# Patient Record
Sex: FEMALE | Race: WHITE | Employment: OTHER | URBAN - METROPOLITAN AREA
[De-identification: names, ages, dates, MRNs, and addresses within clinical notes are randomized per-mention and may not be internally consistent; named-entity substitution may affect disease eponyms.]

---

## 2018-03-23 ENCOUNTER — OFFICE VISIT (OUTPATIENT)
Dept: FAMILY MEDICINE CLINIC | Age: 68
End: 2018-03-23
Payer: MEDICARE

## 2018-03-23 VITALS
WEIGHT: 178 LBS | DIASTOLIC BLOOD PRESSURE: 68 MMHG | OXYGEN SATURATION: 91 % | SYSTOLIC BLOOD PRESSURE: 122 MMHG | HEART RATE: 74 BPM | BODY MASS INDEX: 35.88 KG/M2 | TEMPERATURE: 97.8 F | HEIGHT: 59 IN

## 2018-03-23 DIAGNOSIS — E11.9 TYPE 2 DIABETES MELLITUS WITHOUT COMPLICATION, WITHOUT LONG-TERM CURRENT USE OF INSULIN (HCC): Primary | ICD-10-CM

## 2018-03-23 DIAGNOSIS — Z12.39 BREAST CANCER SCREENING: ICD-10-CM

## 2018-03-23 DIAGNOSIS — E04.9 ENLARGED THYROID: ICD-10-CM

## 2018-03-23 DIAGNOSIS — R79.89 ELEVATED LFTS: ICD-10-CM

## 2018-03-23 DIAGNOSIS — E04.2 MULTIPLE THYROID NODULES: ICD-10-CM

## 2018-03-23 PROCEDURE — 99214 OFFICE O/P EST MOD 30 MIN: CPT | Performed by: NURSE PRACTITIONER

## 2018-03-23 RX ORDER — PEN NEEDLE, DIABETIC 31 G X1/4"
1 NEEDLE, DISPOSABLE MISCELLANEOUS DAILY
Qty: 100 EACH | Refills: 5 | Status: SHIPPED | OUTPATIENT
Start: 2018-03-23 | End: 2019-03-07 | Stop reason: SDUPTHER

## 2018-03-23 ASSESSMENT — ENCOUNTER SYMPTOMS
DIARRHEA: 1
NAUSEA: 0
WHEEZING: 0
BLURRED VISION: 0
ABDOMINAL PAIN: 1
DOUBLE VISION: 0
VOMITING: 0
SHORTNESS OF BREATH: 0
COUGH: 0
CONSTIPATION: 0

## 2018-03-28 ENCOUNTER — TELEPHONE (OUTPATIENT)
Dept: FAMILY MEDICINE CLINIC | Age: 68
End: 2018-03-28

## 2018-03-28 NOTE — TELEPHONE ENCOUNTER
Please call patient and see how she is doing on the Victoza. Let her know that the side effects should subside in a week or so. She is not to increase the dose until the side effects have improved. If she cannot tolerate this medicine after 2 weeks, then she needs an appointment to come see me so that we can determine the next course of treatment. Thank you!

## 2018-03-30 ENCOUNTER — HOSPITAL ENCOUNTER (OUTPATIENT)
Dept: ULTRASOUND IMAGING | Age: 68
Discharge: HOME | End: 2018-04-01
Payer: MEDICARE

## 2018-03-30 DIAGNOSIS — E04.9 ENLARGED THYROID: ICD-10-CM

## 2018-03-30 DIAGNOSIS — E04.2 MULTIPLE THYROID NODULES: ICD-10-CM

## 2018-03-30 PROCEDURE — 76536 US EXAM OF HEAD AND NECK: CPT

## 2018-04-17 ENCOUNTER — TELEPHONE (OUTPATIENT)
Dept: FAMILY MEDICINE CLINIC | Age: 68
End: 2018-04-17

## 2018-05-03 ENCOUNTER — HOSPITAL ENCOUNTER (OUTPATIENT)
Dept: MAMMOGRAPHY | Age: 68
Discharge: HOME | End: 2018-05-05
Payer: MEDICARE

## 2018-05-03 DIAGNOSIS — Z12.39 BREAST CANCER SCREENING: ICD-10-CM

## 2018-05-03 PROCEDURE — 77063 BREAST TOMOSYNTHESIS BI: CPT

## 2018-05-22 RX ORDER — SIMVASTATIN 80 MG
80 TABLET ORAL NIGHTLY
Qty: 30 TABLET | Refills: 0 | Status: SHIPPED | OUTPATIENT
Start: 2018-05-22 | End: 2019-03-07 | Stop reason: SDUPTHER

## 2018-05-22 RX ORDER — QUINAPRIL 5 1/1
5 TABLET ORAL NIGHTLY
Qty: 30 TABLET | Refills: 0 | Status: SHIPPED | OUTPATIENT
Start: 2018-05-22 | End: 2019-03-07 | Stop reason: SDUPTHER

## 2018-05-29 ENCOUNTER — OFFICE VISIT (OUTPATIENT)
Dept: FAMILY MEDICINE CLINIC | Age: 68
End: 2018-05-29
Payer: MEDICARE

## 2018-05-29 ENCOUNTER — HOSPITAL ENCOUNTER (OUTPATIENT)
Age: 68
Discharge: HOME | End: 2018-05-31
Payer: MEDICARE

## 2018-05-29 VITALS
HEIGHT: 59 IN | WEIGHT: 172 LBS | HEART RATE: 84 BPM | SYSTOLIC BLOOD PRESSURE: 114 MMHG | DIASTOLIC BLOOD PRESSURE: 72 MMHG | OXYGEN SATURATION: 95 % | BODY MASS INDEX: 34.68 KG/M2 | RESPIRATION RATE: 18 BRPM | TEMPERATURE: 98.2 F

## 2018-05-29 DIAGNOSIS — E11.9 TYPE 2 DIABETES MELLITUS WITHOUT COMPLICATION, WITHOUT LONG-TERM CURRENT USE OF INSULIN (HCC): Primary | ICD-10-CM

## 2018-05-29 DIAGNOSIS — E11.9 TYPE 2 DIABETES MELLITUS WITHOUT COMPLICATION, WITHOUT LONG-TERM CURRENT USE OF INSULIN (HCC): ICD-10-CM

## 2018-05-29 DIAGNOSIS — E78.5 HYPERLIPIDEMIA, UNSPECIFIED HYPERLIPIDEMIA TYPE: ICD-10-CM

## 2018-05-29 DIAGNOSIS — I10 ESSENTIAL HYPERTENSION: ICD-10-CM

## 2018-05-29 LAB
ALBUMIN SERPL-MCNC: 4.1 G/DL (ref 3.5–5.2)
ALP BLD-CCNC: 108 U/L (ref 35–104)
ALT SERPL-CCNC: 72 U/L (ref 0–32)
ANION GAP SERPL CALCULATED.3IONS-SCNC: 14 MMOL/L (ref 7–16)
AST SERPL-CCNC: 68 U/L (ref 0–31)
BILIRUB SERPL-MCNC: 0.3 MG/DL (ref 0–1.2)
BUN BLDV-MCNC: 17 MG/DL (ref 8–23)
CALCIUM SERPL-MCNC: 9.3 MG/DL (ref 8.6–10.2)
CHLORIDE BLD-SCNC: 99 MMOL/L (ref 98–107)
CHOLESTEROL, TOTAL: 135 MG/DL (ref 0–199)
CO2: 26 MMOL/L (ref 22–29)
CREAT SERPL-MCNC: 0.7 MG/DL (ref 0.5–1)
CREATININE URINE POCT: 300
GFR AFRICAN AMERICAN: >60
GFR NON-AFRICAN AMERICAN: >60 ML/MIN/1.73
GLUCOSE BLD-MCNC: 132 MG/DL (ref 74–109)
HBA1C MFR BLD: 6.7 %
HDLC SERPL-MCNC: 36 MG/DL
LDL CHOLESTEROL CALCULATED: 75 MG/DL (ref 0–99)
MICROALBUMIN/CREAT 24H UR: 30 MG/G{CREAT}
MICROALBUMIN/CREAT UR-RTO: 30
POTASSIUM SERPL-SCNC: 4.9 MMOL/L (ref 3.5–5)
SODIUM BLD-SCNC: 139 MMOL/L (ref 132–146)
TOTAL PROTEIN: 6.6 G/DL (ref 6.4–8.3)
TRIGL SERPL-MCNC: 119 MG/DL (ref 0–149)
TSH SERPL DL<=0.05 MIU/L-ACNC: 0.79 UIU/ML (ref 0.27–4.2)
VLDLC SERPL CALC-MCNC: 24 MG/DL

## 2018-05-29 PROCEDURE — 84443 ASSAY THYROID STIM HORMONE: CPT

## 2018-05-29 PROCEDURE — 80053 COMPREHEN METABOLIC PANEL: CPT

## 2018-05-29 PROCEDURE — 99214 OFFICE O/P EST MOD 30 MIN: CPT | Performed by: NURSE PRACTITIONER

## 2018-05-29 PROCEDURE — 83036 HEMOGLOBIN GLYCOSYLATED A1C: CPT | Performed by: NURSE PRACTITIONER

## 2018-05-29 PROCEDURE — 82044 UR ALBUMIN SEMIQUANTITATIVE: CPT | Performed by: NURSE PRACTITIONER

## 2018-05-29 PROCEDURE — 36415 COLL VENOUS BLD VENIPUNCTURE: CPT | Performed by: NURSE PRACTITIONER

## 2018-05-29 PROCEDURE — 80061 LIPID PANEL: CPT

## 2018-05-29 ASSESSMENT — ENCOUNTER SYMPTOMS
COUGH: 0
CONSTIPATION: 0
NAUSEA: 0
BLURRED VISION: 0
DOUBLE VISION: 0
SHORTNESS OF BREATH: 0
WHEEZING: 0
VOMITING: 0
DIARRHEA: 0

## 2018-06-11 ENCOUNTER — OFFICE VISIT (OUTPATIENT)
Dept: CARDIOLOGY CLINIC | Age: 68
End: 2018-06-11
Payer: MEDICARE

## 2018-06-11 VITALS
WEIGHT: 172.7 LBS | BODY MASS INDEX: 34.82 KG/M2 | RESPIRATION RATE: 18 BRPM | SYSTOLIC BLOOD PRESSURE: 108 MMHG | DIASTOLIC BLOOD PRESSURE: 78 MMHG | HEART RATE: 74 BPM | HEIGHT: 59 IN

## 2018-06-11 DIAGNOSIS — R07.89 ATYPICAL CHEST PAIN: ICD-10-CM

## 2018-06-11 DIAGNOSIS — I25.10 CORONARY ARTERY DISEASE INVOLVING NATIVE CORONARY ARTERY OF NATIVE HEART WITHOUT ANGINA PECTORIS: Primary | ICD-10-CM

## 2018-06-11 PROCEDURE — 3017F COLORECTAL CA SCREEN DOC REV: CPT | Performed by: INTERNAL MEDICINE

## 2018-06-11 PROCEDURE — 99212 OFFICE O/P EST SF 10 MIN: CPT | Performed by: INTERNAL MEDICINE

## 2018-06-11 PROCEDURE — G8400 PT W/DXA NO RESULTS DOC: HCPCS | Performed by: INTERNAL MEDICINE

## 2018-06-11 PROCEDURE — G8427 DOCREV CUR MEDS BY ELIG CLIN: HCPCS | Performed by: INTERNAL MEDICINE

## 2018-06-11 PROCEDURE — 93000 ELECTROCARDIOGRAM COMPLETE: CPT | Performed by: INTERNAL MEDICINE

## 2018-06-11 PROCEDURE — 1123F ACP DISCUSS/DSCN MKR DOCD: CPT | Performed by: INTERNAL MEDICINE

## 2018-06-11 PROCEDURE — 1036F TOBACCO NON-USER: CPT | Performed by: INTERNAL MEDICINE

## 2018-06-11 PROCEDURE — G8598 ASA/ANTIPLAT THER USED: HCPCS | Performed by: INTERNAL MEDICINE

## 2018-06-11 PROCEDURE — G8417 CALC BMI ABV UP PARAM F/U: HCPCS | Performed by: INTERNAL MEDICINE

## 2018-06-11 PROCEDURE — 4040F PNEUMOC VAC/ADMIN/RCVD: CPT | Performed by: INTERNAL MEDICINE

## 2018-06-11 PROCEDURE — 1090F PRES/ABSN URINE INCON ASSESS: CPT | Performed by: INTERNAL MEDICINE

## 2018-06-11 RX ORDER — NITROGLYCERIN 0.4 MG/1
0.4 TABLET SUBLINGUAL EVERY 5 MIN PRN
Qty: 25 TABLET | Refills: 3 | Status: SHIPPED | OUTPATIENT
Start: 2018-06-11 | End: 2019-08-26 | Stop reason: ALTCHOICE

## 2018-06-14 ENCOUNTER — HOSPITAL ENCOUNTER (OUTPATIENT)
Dept: CARDIOLOGY | Age: 68
Discharge: HOME | End: 2018-06-14
Payer: MEDICARE

## 2018-06-14 VITALS
HEIGHT: 59 IN | HEART RATE: 89 BPM | DIASTOLIC BLOOD PRESSURE: 62 MMHG | BODY MASS INDEX: 34.68 KG/M2 | WEIGHT: 172 LBS | SYSTOLIC BLOOD PRESSURE: 122 MMHG

## 2018-06-14 DIAGNOSIS — I25.10 CORONARY ARTERY DISEASE INVOLVING NATIVE CORONARY ARTERY OF NATIVE HEART WITHOUT ANGINA PECTORIS: ICD-10-CM

## 2018-06-14 DIAGNOSIS — R07.89 ATYPICAL CHEST PAIN: Primary | ICD-10-CM

## 2018-06-14 PROCEDURE — 2580000003 HC RX 258: Performed by: INTERNAL MEDICINE

## 2018-06-14 PROCEDURE — 6360000002 HC RX W HCPCS: Performed by: INTERNAL MEDICINE

## 2018-06-14 PROCEDURE — 93017 CV STRESS TEST TRACING ONLY: CPT

## 2018-06-14 PROCEDURE — A9500 TC99M SESTAMIBI: HCPCS | Performed by: INTERNAL MEDICINE

## 2018-06-14 PROCEDURE — 3430000000 HC RX DIAGNOSTIC RADIOPHARMACEUTICAL: Performed by: INTERNAL MEDICINE

## 2018-06-14 PROCEDURE — 78452 HT MUSCLE IMAGE SPECT MULT: CPT

## 2018-06-14 RX ORDER — SODIUM CHLORIDE 0.9 % (FLUSH) 0.9 %
10 SYRINGE (ML) INJECTION PRN
Status: DISCONTINUED | OUTPATIENT
Start: 2018-06-14 | End: 2018-06-15 | Stop reason: HOSPADM

## 2018-06-14 RX ADMIN — Medication 10.2 MILLICURIE: at 06:53

## 2018-06-14 RX ADMIN — Medication 10 ML: at 06:53

## 2018-06-14 RX ADMIN — REGADENOSON 0.4 MG: 0.08 INJECTION, SOLUTION INTRAVENOUS at 08:33

## 2018-06-14 RX ADMIN — Medication 10 ML: at 08:34

## 2018-06-14 RX ADMIN — Medication 32.2 MILLICURIE: at 08:33

## 2018-06-14 RX ADMIN — Medication 10 ML: at 08:33

## 2018-06-15 ENCOUNTER — TELEPHONE (OUTPATIENT)
Dept: CARDIOLOGY CLINIC | Age: 68
End: 2018-06-15

## 2018-07-12 DIAGNOSIS — E11.9 TYPE 2 DIABETES MELLITUS WITHOUT COMPLICATION, WITHOUT LONG-TERM CURRENT USE OF INSULIN (HCC): ICD-10-CM

## 2018-07-17 DIAGNOSIS — E11.9 TYPE 2 DIABETES MELLITUS WITHOUT COMPLICATION, WITHOUT LONG-TERM CURRENT USE OF INSULIN (HCC): ICD-10-CM

## 2018-07-17 RX ORDER — GLUCOSAMINE HCL/CHONDROITIN SU 500-400 MG
CAPSULE ORAL
Qty: 100 STRIP | Refills: 5 | Status: SHIPPED | OUTPATIENT
Start: 2018-07-17 | End: 2019-03-07 | Stop reason: SDUPTHER

## 2018-07-17 RX ORDER — LIRAGLUTIDE 6 MG/ML
INJECTION SUBCUTANEOUS
Qty: 6 PEN | Refills: 3 | Status: SHIPPED | OUTPATIENT
Start: 2018-07-17 | End: 2018-07-31 | Stop reason: SDUPTHER

## 2018-07-18 ENCOUNTER — TELEPHONE (OUTPATIENT)
Dept: FAMILY MEDICINE CLINIC | Age: 68
End: 2018-07-18

## 2018-07-18 DIAGNOSIS — E11.9 TYPE 2 DIABETES MELLITUS WITHOUT COMPLICATION, WITHOUT LONG-TERM CURRENT USE OF INSULIN (HCC): ICD-10-CM

## 2018-07-18 NOTE — TELEPHONE ENCOUNTER
Yes that will be ok. She is not on insulin so it will be fine for her to not do it for that length of time.

## 2018-07-31 DIAGNOSIS — E11.9 TYPE 2 DIABETES MELLITUS WITHOUT COMPLICATION, WITHOUT LONG-TERM CURRENT USE OF INSULIN (HCC): ICD-10-CM

## 2018-08-01 RX ORDER — LANCETS 30 GAUGE
1 EACH MISCELLANEOUS 2 TIMES DAILY
Qty: 200 EACH | Refills: 5 | Status: SHIPPED | OUTPATIENT
Start: 2018-08-01 | End: 2018-12-07 | Stop reason: SDUPTHER

## 2018-08-01 RX ORDER — GLUCOSAMINE HCL/CHONDROITIN SU 500-400 MG
CAPSULE ORAL 2 TIMES DAILY
Qty: 200 STRIP | Refills: 5 | Status: SHIPPED | OUTPATIENT
Start: 2018-08-01 | End: 2018-12-07 | Stop reason: SDUPTHER

## 2018-08-09 RX ORDER — VENLAFAXINE HYDROCHLORIDE 75 MG/1
75 CAPSULE, EXTENDED RELEASE ORAL DAILY
Qty: 30 CAPSULE | Refills: 5 | Status: SHIPPED | OUTPATIENT
Start: 2018-08-09 | End: 2019-03-07 | Stop reason: SDUPTHER

## 2018-08-09 RX ORDER — FAMOTIDINE 20 MG/1
20 TABLET, FILM COATED ORAL 2 TIMES DAILY
Qty: 60 TABLET | Refills: 5 | Status: SHIPPED | OUTPATIENT
Start: 2018-08-09 | End: 2019-03-07 | Stop reason: SDUPTHER

## 2018-08-09 RX ORDER — ATENOLOL 25 MG/1
50 TABLET ORAL DAILY
Qty: 30 TABLET | Refills: 5 | Status: SHIPPED | OUTPATIENT
Start: 2018-08-09 | End: 2018-10-29 | Stop reason: SDUPTHER

## 2018-08-14 DIAGNOSIS — E11.9 TYPE 2 DIABETES MELLITUS WITHOUT COMPLICATION, WITHOUT LONG-TERM CURRENT USE OF INSULIN (HCC): ICD-10-CM

## 2018-08-19 ENCOUNTER — APPOINTMENT (OUTPATIENT)
Dept: GENERAL RADIOLOGY | Age: 68
End: 2018-08-19
Payer: MEDICARE

## 2018-08-19 ENCOUNTER — HOSPITAL ENCOUNTER (EMERGENCY)
Age: 68
Discharge: HOME | End: 2018-08-19
Attending: EMERGENCY MEDICINE
Payer: MEDICARE

## 2018-08-19 VITALS
HEART RATE: 75 BPM | WEIGHT: 172 LBS | BODY MASS INDEX: 34.68 KG/M2 | DIASTOLIC BLOOD PRESSURE: 75 MMHG | SYSTOLIC BLOOD PRESSURE: 114 MMHG | TEMPERATURE: 97.4 F | HEIGHT: 59 IN | RESPIRATION RATE: 19 BRPM | OXYGEN SATURATION: 95 %

## 2018-08-19 DIAGNOSIS — R07.89 ATYPICAL CHEST PAIN: Primary | ICD-10-CM

## 2018-08-19 LAB
ALBUMIN SERPL-MCNC: 3.4 G/DL (ref 3.5–5.2)
ALP BLD-CCNC: 108 U/L (ref 35–104)
ALT SERPL-CCNC: 45 U/L (ref 0–32)
ANION GAP SERPL CALCULATED.3IONS-SCNC: 14 MMOL/L (ref 7–16)
AST SERPL-CCNC: 47 U/L (ref 0–31)
BASOPHILS ABSOLUTE: 0.05 E9/L (ref 0–0.2)
BASOPHILS RELATIVE PERCENT: 0.8 % (ref 0–2)
BILIRUB SERPL-MCNC: 0.4 MG/DL (ref 0–1.2)
BUN BLDV-MCNC: 15 MG/DL (ref 8–23)
CALCIUM SERPL-MCNC: 9 MG/DL (ref 8.6–10.2)
CHLORIDE BLD-SCNC: 101 MMOL/L (ref 98–107)
CO2: 20 MMOL/L (ref 22–29)
CREAT SERPL-MCNC: 0.6 MG/DL (ref 0.5–1)
EOSINOPHILS ABSOLUTE: 0.21 E9/L (ref 0.05–0.5)
EOSINOPHILS RELATIVE PERCENT: 3.4 % (ref 0–6)
GFR AFRICAN AMERICAN: >60
GFR NON-AFRICAN AMERICAN: >60 ML/MIN/1.73
GLUCOSE BLD-MCNC: 181 MG/DL (ref 74–109)
HCT VFR BLD CALC: 35.5 % (ref 34–48)
HEMOGLOBIN: 11.9 G/DL (ref 11.5–15.5)
IMMATURE GRANULOCYTES #: 0.01 E9/L
IMMATURE GRANULOCYTES %: 0.2 % (ref 0–5)
LYMPHOCYTES ABSOLUTE: 1.41 E9/L (ref 1.5–4)
LYMPHOCYTES RELATIVE PERCENT: 22.6 % (ref 20–42)
MCH RBC QN AUTO: 30.7 PG (ref 26–35)
MCHC RBC AUTO-ENTMCNC: 33.5 % (ref 32–34.5)
MCV RBC AUTO: 91.5 FL (ref 80–99.9)
MONOCYTES ABSOLUTE: 0.45 E9/L (ref 0.1–0.95)
MONOCYTES RELATIVE PERCENT: 7.2 % (ref 2–12)
NEUTROPHILS ABSOLUTE: 4.1 E9/L (ref 1.8–7.3)
NEUTROPHILS RELATIVE PERCENT: 65.8 % (ref 43–80)
PDW BLD-RTO: 12.3 FL (ref 11.5–15)
PLATELET # BLD: 214 E9/L (ref 130–450)
PMV BLD AUTO: 9.6 FL (ref 7–12)
POTASSIUM SERPL-SCNC: 4.4 MMOL/L (ref 3.5–5)
RBC # BLD: 3.88 E12/L (ref 3.5–5.5)
SODIUM BLD-SCNC: 135 MMOL/L (ref 132–146)
TOTAL PROTEIN: 6.8 G/DL (ref 6.4–8.3)
TROPONIN: <0.01 NG/ML (ref 0–0.03)
TROPONIN: <0.01 NG/ML (ref 0–0.03)
WBC # BLD: 6.2 E9/L (ref 4.5–11.5)

## 2018-08-19 PROCEDURE — 99285 EMERGENCY DEPT VISIT HI MDM: CPT

## 2018-08-19 PROCEDURE — 93005 ELECTROCARDIOGRAM TRACING: CPT | Performed by: EMERGENCY MEDICINE

## 2018-08-19 PROCEDURE — 80053 COMPREHEN METABOLIC PANEL: CPT

## 2018-08-19 PROCEDURE — 36415 COLL VENOUS BLD VENIPUNCTURE: CPT

## 2018-08-19 PROCEDURE — 85025 COMPLETE CBC W/AUTO DIFF WBC: CPT

## 2018-08-19 PROCEDURE — 6370000000 HC RX 637 (ALT 250 FOR IP): Performed by: STUDENT IN AN ORGANIZED HEALTH CARE EDUCATION/TRAINING PROGRAM

## 2018-08-19 PROCEDURE — 71045 X-RAY EXAM CHEST 1 VIEW: CPT

## 2018-08-19 PROCEDURE — 84484 ASSAY OF TROPONIN QUANT: CPT

## 2018-08-19 RX ADMIN — ASPIRIN 325 MG: 325 TABLET, DELAYED RELEASE ORAL at 11:18

## 2018-08-19 ASSESSMENT — PAIN DESCRIPTION - DESCRIPTORS: DESCRIPTORS: TIGHTNESS

## 2018-08-19 ASSESSMENT — PAIN DESCRIPTION - LOCATION: LOCATION: CHEST

## 2018-08-19 ASSESSMENT — HEART SCORE: ECG: 0

## 2018-08-19 ASSESSMENT — PAIN DESCRIPTION - PAIN TYPE: TYPE: ACUTE PAIN

## 2018-08-19 ASSESSMENT — PAIN SCALES - GENERAL: PAINLEVEL_OUTOF10: 3

## 2018-08-19 ASSESSMENT — PAIN DESCRIPTION - FREQUENCY: FREQUENCY: CONTINUOUS

## 2018-08-19 NOTE — ED NOTES
radiology results have been personally reviewed by myself   LABS:  Results for orders placed or performed during the hospital encounter of 08/19/18   CBC Auto Differential   Result Value Ref Range    WBC 6.2 4.5 - 11.5 E9/L    RBC 3.88 3.50 - 5.50 E12/L    Hemoglobin 11.9 11.5 - 15.5 g/dL    Hematocrit 35.5 34.0 - 48.0 %    MCV 91.5 80.0 - 99.9 fL    MCH 30.7 26.0 - 35.0 pg    MCHC 33.5 32.0 - 34.5 %    RDW 12.3 11.5 - 15.0 fL    Platelets 013 107 - 871 E9/L    MPV 9.6 7.0 - 12.0 fL    Neutrophils % 65.8 43.0 - 80.0 %    Immature Granulocytes % 0.2 0.0 - 5.0 %    Lymphocytes % 22.6 20.0 - 42.0 %    Monocytes % 7.2 2.0 - 12.0 %    Eosinophils % 3.4 0.0 - 6.0 %    Basophils % 0.8 0.0 - 2.0 %    Neutrophils # 4.10 1.80 - 7.30 E9/L    Immature Granulocytes # 0.01 E9/L    Lymphocytes # 1.41 (L) 1.50 - 4.00 E9/L    Monocytes # 0.45 0.10 - 0.95 E9/L    Eosinophils # 0.21 0.05 - 0.50 E9/L    Basophils # 0.05 0.00 - 0.20 E9/L   Comprehensive Metabolic Panel   Result Value Ref Range    Sodium 135 132 - 146 mmol/L    Potassium 4.4 3.5 - 5.0 mmol/L    Chloride 101 98 - 107 mmol/L    CO2 20 (L) 22 - 29 mmol/L    Anion Gap 14 7 - 16 mmol/L    Glucose 181 (H) 74 - 109 mg/dL    BUN 15 8 - 23 mg/dL    CREATININE 0.6 0.5 - 1.0 mg/dL    GFR Non-African American >60 >=60 mL/min/1.73    GFR African American >60     Calcium 9.0 8.6 - 10.2 mg/dL    Total Protein 6.8 6.4 - 8.3 g/dL    Alb 3.4 (L) 3.5 - 5.2 g/dL    Total Bilirubin 0.4 0.0 - 1.2 mg/dL    Alkaline Phosphatase 108 (H) 35 - 104 U/L    ALT 45 (H) 0 - 32 U/L    AST 47 (H) 0 - 31 U/L   TROPONIN   Result Value Ref Range    Troponin <0.01 0.00 - 0.03 ng/mL   TROPONIN   Result Value Ref Range    Troponin <0.01 0.00 - 0.03 ng/mL       RADIOLOGY:  Interpreted by Radiologist.  XR CHEST PORTABLE   Final Result   No airspace opacities or pleural effusion.                ------------------------- NURSING NOTES AND VITALS REVIEWED ---------------------------   The nursing notes within the was unremarkable. Cardiac enzymes were negative x2. Patient reported improvement of pain, and stated that she felt well enough to ambulate. Patient was hemodynamically stable. Decision was to discharge to home and asked patient to follow up within primary care physician and return at anytime if symptoms resume. Counseling: The emergency provider has spoken with the patient and discussed todays results, in addition to providing specific details for the plan of care and counseling regarding the diagnosis and prognosis. Questions are answered at this time and they are agreeable with the plan.      --------------------------------- IMPRESSION AND DISPOSITION ---------------------------------    IMPRESSION  1. Atypical chest pain        DISPOSITION  Disposition: Discharge to home  Patient condition is good      NOTE: This report was transcribed using voice recognition software.  Every effort was made to ensure accuracy; however, inadvertent computerized transcription errors may be present     Adelaide Habermann, MD  Resident  08/19/18 [id number]

## 2018-08-21 LAB
EKG ATRIAL RATE: 83 BPM
EKG P AXIS: 34 DEGREES
EKG P-R INTERVAL: 166 MS
EKG Q-T INTERVAL: 386 MS
EKG QRS DURATION: 74 MS
EKG QTC CALCULATION (BAZETT): 453 MS
EKG R AXIS: 14 DEGREES
EKG T AXIS: 40 DEGREES
EKG VENTRICULAR RATE: 83 BPM

## 2018-08-31 ENCOUNTER — OFFICE VISIT (OUTPATIENT)
Dept: FAMILY MEDICINE CLINIC | Age: 68
End: 2018-08-31
Payer: MEDICARE

## 2018-08-31 VITALS
BODY MASS INDEX: 34.07 KG/M2 | OXYGEN SATURATION: 98 % | DIASTOLIC BLOOD PRESSURE: 72 MMHG | TEMPERATURE: 97.7 F | WEIGHT: 169 LBS | RESPIRATION RATE: 18 BRPM | HEIGHT: 59 IN | SYSTOLIC BLOOD PRESSURE: 110 MMHG | HEART RATE: 73 BPM

## 2018-08-31 DIAGNOSIS — E11.9 TYPE 2 DIABETES MELLITUS WITHOUT COMPLICATION, WITHOUT LONG-TERM CURRENT USE OF INSULIN (HCC): Primary | ICD-10-CM

## 2018-08-31 DIAGNOSIS — I10 ESSENTIAL HYPERTENSION: ICD-10-CM

## 2018-08-31 LAB — HBA1C MFR BLD: 6.3 %

## 2018-08-31 PROCEDURE — 83036 HEMOGLOBIN GLYCOSYLATED A1C: CPT | Performed by: NURSE PRACTITIONER

## 2018-08-31 PROCEDURE — 1123F ACP DISCUSS/DSCN MKR DOCD: CPT | Performed by: NURSE PRACTITIONER

## 2018-08-31 PROCEDURE — 2022F DILAT RTA XM EVC RTNOPTHY: CPT | Performed by: NURSE PRACTITIONER

## 2018-08-31 PROCEDURE — G8427 DOCREV CUR MEDS BY ELIG CLIN: HCPCS | Performed by: NURSE PRACTITIONER

## 2018-08-31 PROCEDURE — G8400 PT W/DXA NO RESULTS DOC: HCPCS | Performed by: NURSE PRACTITIONER

## 2018-08-31 PROCEDURE — 3017F COLORECTAL CA SCREEN DOC REV: CPT | Performed by: NURSE PRACTITIONER

## 2018-08-31 PROCEDURE — G8598 ASA/ANTIPLAT THER USED: HCPCS | Performed by: NURSE PRACTITIONER

## 2018-08-31 PROCEDURE — G8417 CALC BMI ABV UP PARAM F/U: HCPCS | Performed by: NURSE PRACTITIONER

## 2018-08-31 PROCEDURE — 1090F PRES/ABSN URINE INCON ASSESS: CPT | Performed by: NURSE PRACTITIONER

## 2018-08-31 PROCEDURE — 1036F TOBACCO NON-USER: CPT | Performed by: NURSE PRACTITIONER

## 2018-08-31 PROCEDURE — 3044F HG A1C LEVEL LT 7.0%: CPT | Performed by: NURSE PRACTITIONER

## 2018-08-31 PROCEDURE — 4040F PNEUMOC VAC/ADMIN/RCVD: CPT | Performed by: NURSE PRACTITIONER

## 2018-08-31 PROCEDURE — 99214 OFFICE O/P EST MOD 30 MIN: CPT | Performed by: NURSE PRACTITIONER

## 2018-08-31 PROCEDURE — 1101F PT FALLS ASSESS-DOCD LE1/YR: CPT | Performed by: NURSE PRACTITIONER

## 2018-08-31 ASSESSMENT — PATIENT HEALTH QUESTIONNAIRE - PHQ9
SUM OF ALL RESPONSES TO PHQ9 QUESTIONS 1 & 2: 0
1. LITTLE INTEREST OR PLEASURE IN DOING THINGS: 0
SUM OF ALL RESPONSES TO PHQ QUESTIONS 1-9: 0
2. FEELING DOWN, DEPRESSED OR HOPELESS: 0
SUM OF ALL RESPONSES TO PHQ QUESTIONS 1-9: 0

## 2018-08-31 NOTE — PROGRESS NOTES
(36.5 °C) (Oral)   Resp 18   Ht 4' 11\" (1.499 m)   Wt 169 lb (76.7 kg)   SpO2 98%   Breastfeeding? No   BMI 34.13 kg/m²     Patient's medical, social, and family history reviewed      Physical Exam  Physical Exam   Constitutional: She is oriented to person, place, and time. She appears well-developed and well-nourished. HENT:   Head: Normocephalic. Eyes: Pupils are equal, round, and reactive to light. Neck: Normal range of motion. Neck supple. No thyromegaly present. Cardiovascular: Normal rate and regular rhythm. Pulmonary/Chest: Effort normal and breath sounds normal.   Abdominal: Soft. Bowel sounds are normal.   Musculoskeletal: Normal range of motion. Lymphadenopathy:     She has no cervical adenopathy. Neurological: She is alert and oriented to person, place, and time. Skin: Skin is warm and dry. Psychiatric: She has a normal mood and affect. Her behavior is normal.         Assessment/Plan:    1. Type 2 diabetes mellitus without complication, without long-term current use of insulin (HCC)  Continue current medications  Continue lifestyle changes  Increase physical activity  - POCT glycosylated hemoglobin (Hb A1C)    2. Essential hypertension  Maintain current medications    Will repeat fasting labs in 3 months    Return in about 3 months (around 11/30/2018).       ROXY Lewis - CNP

## 2018-09-16 ASSESSMENT — ENCOUNTER SYMPTOMS
CONSTIPATION: 0
BLURRED VISION: 0
DOUBLE VISION: 0
VOMITING: 0
COUGH: 0
WHEEZING: 0
SHORTNESS OF BREATH: 0
NAUSEA: 0
DIARRHEA: 0

## 2018-09-28 DIAGNOSIS — E11.9 TYPE 2 DIABETES MELLITUS WITHOUT COMPLICATION, WITHOUT LONG-TERM CURRENT USE OF INSULIN (HCC): ICD-10-CM

## 2018-09-28 RX ORDER — BLOOD-GLUCOSE METER
EACH MISCELLANEOUS
Qty: 1 KIT | Refills: 0 | Status: SHIPPED | OUTPATIENT
Start: 2018-09-28

## 2018-10-05 ENCOUNTER — NURSE ONLY (OUTPATIENT)
Dept: FAMILY MEDICINE CLINIC | Age: 68
End: 2018-10-05
Payer: MEDICARE

## 2018-10-05 DIAGNOSIS — Z23 NEED FOR INFLUENZA VACCINATION: Primary | ICD-10-CM

## 2018-10-05 PROCEDURE — 90662 IIV NO PRSV INCREASED AG IM: CPT | Performed by: NURSE PRACTITIONER

## 2018-10-05 PROCEDURE — G0008 ADMIN INFLUENZA VIRUS VAC: HCPCS | Performed by: NURSE PRACTITIONER

## 2018-10-05 NOTE — PROGRESS NOTES
Vaccine Information Sheet, \"Influenza - Inactivated\"  given to Maverick Corbett, or parent/legal guardian of  Maverick Corbett and verbalized understanding. Patient responses:    Have you ever had a reaction to a flu vaccine? No  Are you able to eat eggs without adverse effects? Yes  Do you have any current illness? No  Have you ever had Guillian Durham Syndrome? No    Flu vaccine given per order. Please see immunization tab.

## 2018-10-07 DIAGNOSIS — E11.9 TYPE 2 DIABETES MELLITUS WITHOUT COMPLICATION, WITHOUT LONG-TERM CURRENT USE OF INSULIN (HCC): Primary | ICD-10-CM

## 2018-10-07 DIAGNOSIS — I10 ESSENTIAL HYPERTENSION: ICD-10-CM

## 2018-10-07 DIAGNOSIS — E78.5 HYPERLIPIDEMIA, UNSPECIFIED HYPERLIPIDEMIA TYPE: ICD-10-CM

## 2018-10-16 ENCOUNTER — OFFICE VISIT (OUTPATIENT)
Dept: FAMILY MEDICINE CLINIC | Age: 68
End: 2018-10-16
Payer: MEDICARE

## 2018-10-16 ENCOUNTER — HOSPITAL ENCOUNTER (OUTPATIENT)
Age: 68
Discharge: HOME | End: 2018-10-18
Payer: MEDICARE

## 2018-10-16 VITALS
TEMPERATURE: 98.3 F | BODY MASS INDEX: 34.47 KG/M2 | HEART RATE: 84 BPM | WEIGHT: 171 LBS | RESPIRATION RATE: 16 BRPM | HEIGHT: 59 IN | SYSTOLIC BLOOD PRESSURE: 120 MMHG | OXYGEN SATURATION: 95 % | DIASTOLIC BLOOD PRESSURE: 76 MMHG

## 2018-10-16 DIAGNOSIS — N39.0 URINARY TRACT INFECTION WITHOUT HEMATURIA, SITE UNSPECIFIED: Primary | ICD-10-CM

## 2018-10-16 DIAGNOSIS — N39.0 URINARY TRACT INFECTION WITHOUT HEMATURIA, SITE UNSPECIFIED: ICD-10-CM

## 2018-10-16 DIAGNOSIS — L30.9 DERMATITIS: ICD-10-CM

## 2018-10-16 LAB
BILIRUBIN, POC: NORMAL
BLOOD URINE, POC: NORMAL
CLARITY, POC: CLEAR
COLOR, POC: YELLOW
GLUCOSE URINE, POC: NORMAL
KETONES, POC: NORMAL
LEUKOCYTE EST, POC: NORMAL
NITRITE, POC: POSITIVE
PH, POC: 5.5
PROTEIN, POC: NORMAL
SPECIFIC GRAVITY, POC: 1.03
UROBILINOGEN, POC: 0.2

## 2018-10-16 PROCEDURE — G8482 FLU IMMUNIZE ORDER/ADMIN: HCPCS | Performed by: NURSE PRACTITIONER

## 2018-10-16 PROCEDURE — 4040F PNEUMOC VAC/ADMIN/RCVD: CPT | Performed by: NURSE PRACTITIONER

## 2018-10-16 PROCEDURE — 1036F TOBACCO NON-USER: CPT | Performed by: NURSE PRACTITIONER

## 2018-10-16 PROCEDURE — 87186 SC STD MICRODIL/AGAR DIL: CPT

## 2018-10-16 PROCEDURE — G8598 ASA/ANTIPLAT THER USED: HCPCS | Performed by: NURSE PRACTITIONER

## 2018-10-16 PROCEDURE — 1123F ACP DISCUSS/DSCN MKR DOCD: CPT | Performed by: NURSE PRACTITIONER

## 2018-10-16 PROCEDURE — 1090F PRES/ABSN URINE INCON ASSESS: CPT | Performed by: NURSE PRACTITIONER

## 2018-10-16 PROCEDURE — 1101F PT FALLS ASSESS-DOCD LE1/YR: CPT | Performed by: NURSE PRACTITIONER

## 2018-10-16 PROCEDURE — 87088 URINE BACTERIA CULTURE: CPT

## 2018-10-16 PROCEDURE — 81003 URINALYSIS AUTO W/O SCOPE: CPT | Performed by: NURSE PRACTITIONER

## 2018-10-16 PROCEDURE — G8400 PT W/DXA NO RESULTS DOC: HCPCS | Performed by: NURSE PRACTITIONER

## 2018-10-16 PROCEDURE — 3017F COLORECTAL CA SCREEN DOC REV: CPT | Performed by: NURSE PRACTITIONER

## 2018-10-16 PROCEDURE — G8427 DOCREV CUR MEDS BY ELIG CLIN: HCPCS | Performed by: NURSE PRACTITIONER

## 2018-10-16 PROCEDURE — 99213 OFFICE O/P EST LOW 20 MIN: CPT | Performed by: NURSE PRACTITIONER

## 2018-10-16 PROCEDURE — G8417 CALC BMI ABV UP PARAM F/U: HCPCS | Performed by: NURSE PRACTITIONER

## 2018-10-16 RX ORDER — CLOTRIMAZOLE AND BETAMETHASONE DIPROPIONATE 10; .64 MG/G; MG/G
CREAM TOPICAL
Qty: 45 G | Refills: 0 | Status: SHIPPED | OUTPATIENT
Start: 2018-10-16 | End: 2019-01-26 | Stop reason: ALTCHOICE

## 2018-10-16 RX ORDER — SULFAMETHOXAZOLE AND TRIMETHOPRIM 800; 160 MG/1; MG/1
1 TABLET ORAL 2 TIMES DAILY
Qty: 20 TABLET | Refills: 0 | Status: SHIPPED | OUTPATIENT
Start: 2018-10-16 | End: 2018-10-26

## 2018-10-16 ASSESSMENT — ENCOUNTER SYMPTOMS
WHEEZING: 0
DIARRHEA: 0
NAUSEA: 0
VOMITING: 0
COUGH: 0
CONSTIPATION: 0
SHORTNESS OF BREATH: 0

## 2018-10-16 NOTE — PROGRESS NOTES
MM MISC 1 each by Does not apply route daily Yes ROXY Armas CNP   Lancets MISC PT TESTS TWICE DAILY Yes ROXY Armas CNP   aspirin 81 MG EC tablet Take 81 mg by mouth daily. Yes Historical Provider, MD   Acetaminophen (TYLENOL PO) Take  by mouth as needed. Yes Historical Provider, MD         No Known Allergies      Review of Systems  Review of Systems   Constitutional: Negative for chills and fever. HENT: Negative for congestion and nosebleeds. Respiratory: Negative for cough, shortness of breath and wheezing. Cardiovascular: Negative for chest pain, palpitations and leg swelling. Gastrointestinal: Negative for constipation, diarrhea, nausea and vomiting. Genitourinary: Positive for frequency and urgency. Negative for dysuria. Musculoskeletal: Negative for neck pain. Neurological: Negative for dizziness and headaches. VS:  /76   Pulse 84   Temp 98.3 °F (36.8 °C) (Oral)   Resp 16   Ht 4' 11\" (1.499 m)   Wt 171 lb (77.6 kg)   SpO2 95%   Breastfeeding? No   BMI 34.54 kg/m²     Patient's medical, social, and family history reviewed      Physical Exam  Physical Exam   Constitutional: She is oriented to person, place, and time. She appears well-developed and well-nourished. HENT:   Head: Normocephalic. Eyes: Pupils are equal, round, and reactive to light. Neck: Normal range of motion. Neck supple. No thyromegaly present. Cardiovascular: Normal rate and regular rhythm. Pulmonary/Chest: Effort normal and breath sounds normal.   Abdominal: Soft. Bowel sounds are normal.   Musculoskeletal: Normal range of motion. Lymphadenopathy:     She has no cervical adenopathy. Neurological: She is alert and oriented to person, place, and time. Skin: Skin is warm and dry. Psychiatric: She has a normal mood and affect. Her behavior is normal.         Assessment/Plan:    1.  Urinary tract infection without hematuria, site unspecified  Did discuss OTC

## 2018-10-18 DIAGNOSIS — N39.0 URINARY TRACT INFECTION WITHOUT HEMATURIA, SITE UNSPECIFIED: Primary | ICD-10-CM

## 2018-10-18 LAB
ORGANISM: ABNORMAL
URINE CULTURE, ROUTINE: ABNORMAL
URINE CULTURE, ROUTINE: ABNORMAL

## 2018-10-18 RX ORDER — NITROFURANTOIN 25; 75 MG/1; MG/1
100 CAPSULE ORAL 2 TIMES DAILY
Qty: 14 CAPSULE | Refills: 0 | Status: SHIPPED | OUTPATIENT
Start: 2018-10-18 | End: 2018-10-25

## 2018-10-29 DIAGNOSIS — I10 ESSENTIAL HYPERTENSION: Primary | ICD-10-CM

## 2018-10-29 RX ORDER — ATENOLOL 25 MG/1
50 TABLET ORAL DAILY
Qty: 180 TABLET | Refills: 2 | Status: SHIPPED | OUTPATIENT
Start: 2018-10-29 | End: 2018-12-05 | Stop reason: SDUPTHER

## 2018-11-08 ENCOUNTER — OFFICE VISIT (OUTPATIENT)
Dept: FAMILY MEDICINE CLINIC | Age: 68
End: 2018-11-08
Payer: MEDICARE

## 2018-11-08 VITALS
HEART RATE: 90 BPM | TEMPERATURE: 98.2 F | WEIGHT: 170.75 LBS | SYSTOLIC BLOOD PRESSURE: 94 MMHG | RESPIRATION RATE: 16 BRPM | BODY MASS INDEX: 34.42 KG/M2 | HEIGHT: 59 IN | OXYGEN SATURATION: 96 % | DIASTOLIC BLOOD PRESSURE: 60 MMHG

## 2018-11-08 DIAGNOSIS — R05.9 COUGH: Primary | ICD-10-CM

## 2018-11-08 DIAGNOSIS — R09.82 PND (POST-NASAL DRIP): ICD-10-CM

## 2018-11-08 PROCEDURE — 3017F COLORECTAL CA SCREEN DOC REV: CPT | Performed by: NURSE PRACTITIONER

## 2018-11-08 PROCEDURE — G8427 DOCREV CUR MEDS BY ELIG CLIN: HCPCS | Performed by: NURSE PRACTITIONER

## 2018-11-08 PROCEDURE — G8400 PT W/DXA NO RESULTS DOC: HCPCS | Performed by: NURSE PRACTITIONER

## 2018-11-08 PROCEDURE — G8598 ASA/ANTIPLAT THER USED: HCPCS | Performed by: NURSE PRACTITIONER

## 2018-11-08 PROCEDURE — 99213 OFFICE O/P EST LOW 20 MIN: CPT | Performed by: NURSE PRACTITIONER

## 2018-11-08 PROCEDURE — G8482 FLU IMMUNIZE ORDER/ADMIN: HCPCS | Performed by: NURSE PRACTITIONER

## 2018-11-08 PROCEDURE — G8417 CALC BMI ABV UP PARAM F/U: HCPCS | Performed by: NURSE PRACTITIONER

## 2018-11-08 PROCEDURE — 4040F PNEUMOC VAC/ADMIN/RCVD: CPT | Performed by: NURSE PRACTITIONER

## 2018-11-08 PROCEDURE — 1101F PT FALLS ASSESS-DOCD LE1/YR: CPT | Performed by: NURSE PRACTITIONER

## 2018-11-08 PROCEDURE — 1123F ACP DISCUSS/DSCN MKR DOCD: CPT | Performed by: NURSE PRACTITIONER

## 2018-11-08 PROCEDURE — 1090F PRES/ABSN URINE INCON ASSESS: CPT | Performed by: NURSE PRACTITIONER

## 2018-11-08 PROCEDURE — 1036F TOBACCO NON-USER: CPT | Performed by: NURSE PRACTITIONER

## 2018-11-08 RX ORDER — FLUTICASONE PROPIONATE 50 MCG
1 SPRAY, SUSPENSION (ML) NASAL DAILY
Qty: 2 BOTTLE | Refills: 1 | Status: SHIPPED | OUTPATIENT
Start: 2018-11-08 | End: 2019-03-07 | Stop reason: ALTCHOICE

## 2018-11-08 RX ORDER — BENZONATATE 100 MG/1
100 CAPSULE ORAL 3 TIMES DAILY PRN
Qty: 20 CAPSULE | Refills: 1 | Status: SHIPPED | OUTPATIENT
Start: 2018-11-08 | End: 2018-11-15

## 2018-11-19 DIAGNOSIS — E11.9 TYPE 2 DIABETES MELLITUS WITHOUT COMPLICATION, WITHOUT LONG-TERM CURRENT USE OF INSULIN (HCC): ICD-10-CM

## 2018-11-20 RX ORDER — ISOPROPYL ALCOHOL 0.75 G/1
SWAB TOPICAL
Qty: 100 EACH | Refills: 5 | Status: SHIPPED | OUTPATIENT
Start: 2018-11-20 | End: 2019-12-16 | Stop reason: SDUPTHER

## 2018-11-20 RX ORDER — LIRAGLUTIDE 6 MG/ML
INJECTION SUBCUTANEOUS
Qty: 18 ML | Refills: 3 | Status: SHIPPED | OUTPATIENT
Start: 2018-11-20 | End: 2019-03-07 | Stop reason: SDUPTHER

## 2018-11-29 ENCOUNTER — HOSPITAL ENCOUNTER (OUTPATIENT)
Age: 68
Discharge: HOME | End: 2018-12-01
Payer: MEDICARE

## 2018-11-29 ENCOUNTER — NURSE ONLY (OUTPATIENT)
Dept: FAMILY MEDICINE CLINIC | Age: 68
End: 2018-11-29
Payer: MEDICARE

## 2018-11-29 DIAGNOSIS — E78.5 HYPERLIPIDEMIA, UNSPECIFIED HYPERLIPIDEMIA TYPE: ICD-10-CM

## 2018-11-29 DIAGNOSIS — I10 ESSENTIAL HYPERTENSION: ICD-10-CM

## 2018-11-29 DIAGNOSIS — E11.9 TYPE 2 DIABETES MELLITUS WITHOUT COMPLICATION, WITHOUT LONG-TERM CURRENT USE OF INSULIN (HCC): ICD-10-CM

## 2018-11-29 LAB
ALBUMIN SERPL-MCNC: 4 G/DL (ref 3.5–5.2)
ALP BLD-CCNC: 113 U/L (ref 35–104)
ALT SERPL-CCNC: 36 U/L (ref 0–32)
ANION GAP SERPL CALCULATED.3IONS-SCNC: 16 MMOL/L (ref 7–16)
AST SERPL-CCNC: 37 U/L (ref 0–31)
BILIRUB SERPL-MCNC: 0.3 MG/DL (ref 0–1.2)
BUN BLDV-MCNC: 15 MG/DL (ref 8–23)
CALCIUM SERPL-MCNC: 9.2 MG/DL (ref 8.6–10.2)
CHLORIDE BLD-SCNC: 101 MMOL/L (ref 98–107)
CHOLESTEROL, TOTAL: 140 MG/DL (ref 0–199)
CO2: 25 MMOL/L (ref 22–29)
CREAT SERPL-MCNC: 0.7 MG/DL (ref 0.5–1)
GFR AFRICAN AMERICAN: >60
GFR NON-AFRICAN AMERICAN: >60 ML/MIN/1.73
GLUCOSE BLD-MCNC: 121 MG/DL (ref 74–99)
HBA1C MFR BLD: 6.2 % (ref 4–5.6)
HCT VFR BLD CALC: 41.3 % (ref 34–48)
HDLC SERPL-MCNC: 45 MG/DL
HEMOGLOBIN: 12.7 G/DL (ref 11.5–15.5)
LDL CHOLESTEROL CALCULATED: 75 MG/DL (ref 0–99)
MCH RBC QN AUTO: 29.5 PG (ref 26–35)
MCHC RBC AUTO-ENTMCNC: 30.8 % (ref 32–34.5)
MCV RBC AUTO: 96 FL (ref 80–99.9)
PDW BLD-RTO: 12.8 FL (ref 11.5–15)
PLATELET # BLD: 226 E9/L (ref 130–450)
PMV BLD AUTO: 9.8 FL (ref 7–12)
POTASSIUM SERPL-SCNC: 5 MMOL/L (ref 3.5–5)
RBC # BLD: 4.3 E12/L (ref 3.5–5.5)
SODIUM BLD-SCNC: 142 MMOL/L (ref 132–146)
TOTAL PROTEIN: 6.9 G/DL (ref 6.4–8.3)
TRIGL SERPL-MCNC: 98 MG/DL (ref 0–149)
VLDLC SERPL CALC-MCNC: 20 MG/DL
WBC # BLD: 5.8 E9/L (ref 4.5–11.5)

## 2018-11-29 PROCEDURE — 36415 COLL VENOUS BLD VENIPUNCTURE: CPT | Performed by: NURSE PRACTITIONER

## 2018-11-29 PROCEDURE — 83036 HEMOGLOBIN GLYCOSYLATED A1C: CPT

## 2018-11-29 PROCEDURE — 85027 COMPLETE CBC AUTOMATED: CPT

## 2018-11-29 PROCEDURE — 80053 COMPREHEN METABOLIC PANEL: CPT

## 2018-11-29 PROCEDURE — 80061 LIPID PANEL: CPT

## 2018-11-30 ASSESSMENT — ENCOUNTER SYMPTOMS
COUGH: 1
CONSTIPATION: 0
VOMITING: 0
RHINORRHEA: 1
DIARRHEA: 0
WHEEZING: 0
NAUSEA: 0
SHORTNESS OF BREATH: 0

## 2018-12-05 DIAGNOSIS — I10 ESSENTIAL HYPERTENSION: ICD-10-CM

## 2018-12-06 RX ORDER — ATENOLOL 25 MG/1
50 TABLET ORAL DAILY
Qty: 180 TABLET | Refills: 1 | Status: SHIPPED | OUTPATIENT
Start: 2018-12-06 | End: 2019-03-07 | Stop reason: SDUPTHER

## 2018-12-07 ENCOUNTER — OFFICE VISIT (OUTPATIENT)
Dept: FAMILY MEDICINE CLINIC | Age: 68
End: 2018-12-07
Payer: MEDICARE

## 2018-12-07 VITALS
SYSTOLIC BLOOD PRESSURE: 114 MMHG | HEART RATE: 83 BPM | WEIGHT: 169.8 LBS | DIASTOLIC BLOOD PRESSURE: 76 MMHG | RESPIRATION RATE: 16 BRPM | HEIGHT: 59 IN | OXYGEN SATURATION: 93 % | TEMPERATURE: 98.4 F | BODY MASS INDEX: 34.23 KG/M2

## 2018-12-07 DIAGNOSIS — I10 ESSENTIAL HYPERTENSION: ICD-10-CM

## 2018-12-07 DIAGNOSIS — E11.9 TYPE 2 DIABETES MELLITUS WITHOUT COMPLICATION, WITHOUT LONG-TERM CURRENT USE OF INSULIN (HCC): Primary | ICD-10-CM

## 2018-12-07 DIAGNOSIS — Z12.11 SCREENING FOR COLON CANCER: ICD-10-CM

## 2018-12-07 DIAGNOSIS — E78.5 HYPERLIPIDEMIA, UNSPECIFIED HYPERLIPIDEMIA TYPE: ICD-10-CM

## 2018-12-07 PROCEDURE — 99214 OFFICE O/P EST MOD 30 MIN: CPT | Performed by: NURSE PRACTITIONER

## 2018-12-07 PROCEDURE — 1036F TOBACCO NON-USER: CPT | Performed by: NURSE PRACTITIONER

## 2018-12-07 PROCEDURE — 4040F PNEUMOC VAC/ADMIN/RCVD: CPT | Performed by: NURSE PRACTITIONER

## 2018-12-07 PROCEDURE — G8598 ASA/ANTIPLAT THER USED: HCPCS | Performed by: NURSE PRACTITIONER

## 2018-12-07 PROCEDURE — 3044F HG A1C LEVEL LT 7.0%: CPT | Performed by: NURSE PRACTITIONER

## 2018-12-07 PROCEDURE — 1123F ACP DISCUSS/DSCN MKR DOCD: CPT | Performed by: NURSE PRACTITIONER

## 2018-12-07 PROCEDURE — G8427 DOCREV CUR MEDS BY ELIG CLIN: HCPCS | Performed by: NURSE PRACTITIONER

## 2018-12-07 PROCEDURE — 1090F PRES/ABSN URINE INCON ASSESS: CPT | Performed by: NURSE PRACTITIONER

## 2018-12-07 PROCEDURE — G8417 CALC BMI ABV UP PARAM F/U: HCPCS | Performed by: NURSE PRACTITIONER

## 2018-12-07 PROCEDURE — 3017F COLORECTAL CA SCREEN DOC REV: CPT | Performed by: NURSE PRACTITIONER

## 2018-12-07 PROCEDURE — 2022F DILAT RTA XM EVC RTNOPTHY: CPT | Performed by: NURSE PRACTITIONER

## 2018-12-07 PROCEDURE — G8400 PT W/DXA NO RESULTS DOC: HCPCS | Performed by: NURSE PRACTITIONER

## 2018-12-07 PROCEDURE — 1101F PT FALLS ASSESS-DOCD LE1/YR: CPT | Performed by: NURSE PRACTITIONER

## 2018-12-07 PROCEDURE — G8482 FLU IMMUNIZE ORDER/ADMIN: HCPCS | Performed by: NURSE PRACTITIONER

## 2018-12-07 ASSESSMENT — ENCOUNTER SYMPTOMS
VOMITING: 0
CONSTIPATION: 0
NAUSEA: 0
WHEEZING: 0
COUGH: 0
SHORTNESS OF BREATH: 0
BACK PAIN: 1
DIARRHEA: 0

## 2018-12-07 NOTE — PROGRESS NOTES
Chief Complaint   Patient presents with    Diabetes     3 month follow up. HPI:  Patient presents today for 3 month follow up of her diabetes. She states she checks her blood sugars twice a day. In the morning they are usually in the low 100's and in the evening 120-180's. Her A1C on 11/29/18 was 6.2. She is currently taking Victoza 1.2mg daily without issue. She reports she does try to watch what she eats, but she does not exercise. Denies increase in thirst or urination. Denies numbness and tingling to extremities or blurred vision. Diabetes Mellitus Type II, Follow-up: Patient here for follow-up of Type 2 diabetes mellitus. This is a longstanding problem. Is taking all medications as prescribed and is tolerating well. Current diabetic medications include lantus and victoza  No polyuria/ polydipsia  No numbness /tingling of hands or feet  No blurred vision. Lab Results   Component Value Date    LABA1C 6.2 11/29/2018    LABA1C 6.3 08/31/2018    LABA1C 6.7 05/29/2018       Known diabetic complications: none  Eye exam current (within one year): no  Annual Podiatry visit: No:   Any episodes of hypoglycemia? No  ACE inhibitor or angiotensin II receptor blocker? Yes  Aspirin? Yes  Microalbumin:   No results found for: SRUTHI [id number]  Hemoglobin A1C (%)   Date Value   11/29/2018 6.2 (H)     No results found for: [facility]      Prior to Visit Medications    Medication Sig Taking?  Authorizing Provider   atenolol (TENORMIN) 25 MG tablet Take 2 tablets by mouth daily Yes ROXY Richards CNP   VICTOZA 18 MG/3ML SOPN SC injection INJECT 1.2 MG SUBCUTANEOUSLY DAILY Yes ROXY Richards CNP   Alcohol Swabs (B-D SINGLE USE SWABS REGULAR) PADS USE TO CLEAN FINGER PRIOR TO BLOOD SUGAR CHECK Yes ROXY Richards CNP   Blood Glucose Monitoring Suppl (ACCU-CHEK STEVEN PLUS) w/Device KIT CHECK BLOOD SUGAR TWICE DAILY Yes ROXY Richards CNP   venlafaxine (EFFEXOR XR) 75 MG

## 2018-12-10 ENCOUNTER — TELEPHONE (OUTPATIENT)
Dept: FAMILY MEDICINE CLINIC | Age: 68
End: 2018-12-10

## 2019-01-22 DIAGNOSIS — N39.0 URINARY TRACT INFECTION WITHOUT HEMATURIA, SITE UNSPECIFIED: Primary | ICD-10-CM

## 2019-01-22 RX ORDER — AMOXICILLIN AND CLAVULANATE POTASSIUM 875; 125 MG/1; MG/1
1 TABLET, FILM COATED ORAL 2 TIMES DAILY
Qty: 28 TABLET | Refills: 0 | Status: SHIPPED | OUTPATIENT
Start: 2019-01-22 | End: 2019-02-05

## 2019-01-26 ENCOUNTER — HOSPITAL ENCOUNTER (EMERGENCY)
Age: 69
Discharge: HOME | End: 2019-01-26
Payer: MEDICARE

## 2019-01-26 ENCOUNTER — APPOINTMENT (OUTPATIENT)
Dept: CT IMAGING | Age: 69
End: 2019-01-26
Payer: MEDICARE

## 2019-01-26 VITALS
DIASTOLIC BLOOD PRESSURE: 53 MMHG | SYSTOLIC BLOOD PRESSURE: 91 MMHG | RESPIRATION RATE: 14 BRPM | WEIGHT: 167 LBS | BODY MASS INDEX: 33.67 KG/M2 | HEIGHT: 59 IN | TEMPERATURE: 98.1 F | HEART RATE: 83 BPM | OXYGEN SATURATION: 96 %

## 2019-01-26 DIAGNOSIS — K57.92 ACUTE DIVERTICULITIS: Primary | ICD-10-CM

## 2019-01-26 LAB
ALBUMIN SERPL-MCNC: 4.5 G/DL (ref 3.5–5.2)
ALP BLD-CCNC: 148 U/L (ref 35–104)
ALT SERPL-CCNC: 28 U/L (ref 0–32)
ANION GAP SERPL CALCULATED.3IONS-SCNC: 14 MMOL/L (ref 7–16)
AST SERPL-CCNC: 24 U/L (ref 0–31)
BASOPHILS ABSOLUTE: 0.06 E9/L (ref 0–0.2)
BASOPHILS RELATIVE PERCENT: 0.5 % (ref 0–2)
BILIRUB SERPL-MCNC: 0.4 MG/DL (ref 0–1.2)
BILIRUBIN URINE: ABNORMAL
BLOOD, URINE: NEGATIVE
BUN BLDV-MCNC: 23 MG/DL (ref 8–23)
CALCIUM SERPL-MCNC: 9.1 MG/DL (ref 8.6–10.2)
CHLORIDE BLD-SCNC: 99 MMOL/L (ref 98–107)
CLARITY: CLEAR
CO2: 21 MMOL/L (ref 22–29)
COLOR: YELLOW
CREAT SERPL-MCNC: 0.9 MG/DL (ref 0.5–1)
EOSINOPHILS ABSOLUTE: 0.07 E9/L (ref 0.05–0.5)
EOSINOPHILS RELATIVE PERCENT: 0.6 % (ref 0–6)
GFR AFRICAN AMERICAN: >60
GFR NON-AFRICAN AMERICAN: >60 ML/MIN/1.73
GLUCOSE BLD-MCNC: 126 MG/DL (ref 74–99)
GLUCOSE URINE: NEGATIVE MG/DL
HCT VFR BLD CALC: 39.2 % (ref 34–48)
HEMOGLOBIN: 13.1 G/DL (ref 11.5–15.5)
IMMATURE GRANULOCYTES #: 0.05 E9/L
IMMATURE GRANULOCYTES %: 0.4 % (ref 0–5)
KETONES, URINE: NEGATIVE MG/DL
LACTIC ACID: 1.2 MMOL/L (ref 0.5–2.2)
LEUKOCYTE ESTERASE, URINE: NEGATIVE
LIPASE: 78 U/L (ref 13–60)
LYMPHOCYTES ABSOLUTE: 2.21 E9/L (ref 1.5–4)
LYMPHOCYTES RELATIVE PERCENT: 17.7 % (ref 20–42)
MCH RBC QN AUTO: 30.3 PG (ref 26–35)
MCHC RBC AUTO-ENTMCNC: 33.4 % (ref 32–34.5)
MCV RBC AUTO: 90.5 FL (ref 80–99.9)
MONOCYTES ABSOLUTE: 1.28 E9/L (ref 0.1–0.95)
MONOCYTES RELATIVE PERCENT: 10.2 % (ref 2–12)
NEUTROPHILS ABSOLUTE: 8.84 E9/L (ref 1.8–7.3)
NEUTROPHILS RELATIVE PERCENT: 70.6 % (ref 43–80)
NITRITE, URINE: NEGATIVE
PDW BLD-RTO: 12.3 FL (ref 11.5–15)
PH UA: 5.5 (ref 5–9)
PLATELET # BLD: 265 E9/L (ref 130–450)
PMV BLD AUTO: 9.4 FL (ref 7–12)
POTASSIUM SERPL-SCNC: 5 MMOL/L (ref 3.5–5)
PROTEIN UA: NEGATIVE MG/DL
RBC # BLD: 4.33 E12/L (ref 3.5–5.5)
SODIUM BLD-SCNC: 134 MMOL/L (ref 132–146)
SPECIFIC GRAVITY UA: 1.02 (ref 1–1.03)
TOTAL PROTEIN: 7.5 G/DL (ref 6.4–8.3)
UROBILINOGEN, URINE: 0.2 E.U./DL
WBC # BLD: 12.5 E9/L (ref 4.5–11.5)

## 2019-01-26 PROCEDURE — 83605 ASSAY OF LACTIC ACID: CPT

## 2019-01-26 PROCEDURE — 2580000003 HC RX 258: Performed by: RADIOLOGY

## 2019-01-26 PROCEDURE — 96375 TX/PRO/DX INJ NEW DRUG ADDON: CPT

## 2019-01-26 PROCEDURE — 74177 CT ABD & PELVIS W/CONTRAST: CPT

## 2019-01-26 PROCEDURE — 81003 URINALYSIS AUTO W/O SCOPE: CPT

## 2019-01-26 PROCEDURE — 80053 COMPREHEN METABOLIC PANEL: CPT

## 2019-01-26 PROCEDURE — 2580000003 HC RX 258: Performed by: PHYSICIAN ASSISTANT

## 2019-01-26 PROCEDURE — 36415 COLL VENOUS BLD VENIPUNCTURE: CPT

## 2019-01-26 PROCEDURE — 87088 URINE BACTERIA CULTURE: CPT

## 2019-01-26 PROCEDURE — 85025 COMPLETE CBC W/AUTO DIFF WBC: CPT

## 2019-01-26 PROCEDURE — 99284 EMERGENCY DEPT VISIT MOD MDM: CPT

## 2019-01-26 PROCEDURE — 6360000002 HC RX W HCPCS: Performed by: PHYSICIAN ASSISTANT

## 2019-01-26 PROCEDURE — 96374 THER/PROPH/DIAG INJ IV PUSH: CPT

## 2019-01-26 PROCEDURE — 83690 ASSAY OF LIPASE: CPT

## 2019-01-26 PROCEDURE — 6360000004 HC RX CONTRAST MEDICATION: Performed by: RADIOLOGY

## 2019-01-26 RX ORDER — ONDANSETRON 2 MG/ML
4 INJECTION INTRAMUSCULAR; INTRAVENOUS ONCE
Status: COMPLETED | OUTPATIENT
Start: 2019-01-26 | End: 2019-01-26

## 2019-01-26 RX ORDER — ONDANSETRON 4 MG/1
4 TABLET, ORALLY DISINTEGRATING ORAL EVERY 8 HOURS PRN
Qty: 24 TABLET | Refills: 0 | Status: SHIPPED | OUTPATIENT
Start: 2019-01-26 | End: 2019-03-07 | Stop reason: ALTCHOICE

## 2019-01-26 RX ORDER — 0.9 % SODIUM CHLORIDE 0.9 %
1000 INTRAVENOUS SOLUTION INTRAVENOUS ONCE
Status: COMPLETED | OUTPATIENT
Start: 2019-01-26 | End: 2019-01-26

## 2019-01-26 RX ORDER — CIPROFLOXACIN 500 MG/1
500 TABLET, FILM COATED ORAL 2 TIMES DAILY
Qty: 20 TABLET | Refills: 0 | Status: SHIPPED | OUTPATIENT
Start: 2019-01-26 | End: 2019-02-05

## 2019-01-26 RX ORDER — KETOROLAC TROMETHAMINE 30 MG/ML
15 INJECTION, SOLUTION INTRAMUSCULAR; INTRAVENOUS ONCE
Status: COMPLETED | OUTPATIENT
Start: 2019-01-26 | End: 2019-01-26

## 2019-01-26 RX ORDER — TRAMADOL HYDROCHLORIDE 50 MG/1
50 TABLET ORAL EVERY 6 HOURS PRN
Qty: 12 TABLET | Refills: 0 | Status: SHIPPED | OUTPATIENT
Start: 2019-01-26 | End: 2019-01-29

## 2019-01-26 RX ORDER — METRONIDAZOLE 500 MG/1
500 TABLET ORAL 2 TIMES DAILY
Qty: 20 TABLET | Refills: 0 | Status: SHIPPED | OUTPATIENT
Start: 2019-01-26 | End: 2019-02-05

## 2019-01-26 RX ORDER — SODIUM CHLORIDE 0.9 % (FLUSH) 0.9 %
10 SYRINGE (ML) INJECTION ONCE
Status: COMPLETED | OUTPATIENT
Start: 2019-01-26 | End: 2019-01-26

## 2019-01-26 RX ADMIN — ONDANSETRON 4 MG: 2 INJECTION INTRAMUSCULAR; INTRAVENOUS at 11:35

## 2019-01-26 RX ADMIN — SODIUM CHLORIDE 1000 ML: 9 INJECTION, SOLUTION INTRAVENOUS at 13:21

## 2019-01-26 RX ADMIN — SODIUM CHLORIDE 1000 ML: 9 INJECTION, SOLUTION INTRAVENOUS at 11:35

## 2019-01-26 RX ADMIN — IOPAMIDOL 110 ML: 755 INJECTION, SOLUTION INTRAVENOUS at 12:55

## 2019-01-26 RX ADMIN — Medication 10 ML: at 12:55

## 2019-01-26 RX ADMIN — KETOROLAC TROMETHAMINE 15 MG: 30 INJECTION, SOLUTION INTRAMUSCULAR; INTRAVENOUS at 11:35

## 2019-01-26 ASSESSMENT — PAIN SCALES - WONG BAKER: WONGBAKER_NUMERICALRESPONSE: 0

## 2019-01-26 ASSESSMENT — PAIN DESCRIPTION - LOCATION: LOCATION: ABDOMEN;FLANK

## 2019-01-26 ASSESSMENT — PAIN DESCRIPTION - DESCRIPTORS: DESCRIPTORS: ACHING;SORE

## 2019-01-26 ASSESSMENT — PAIN SCALES - GENERAL
PAINLEVEL_OUTOF10: 0
PAINLEVEL_OUTOF10: 6
PAINLEVEL_OUTOF10: 6

## 2019-01-26 ASSESSMENT — PAIN DESCRIPTION - ORIENTATION: ORIENTATION: LEFT

## 2019-01-26 ASSESSMENT — PAIN DESCRIPTION - PAIN TYPE: TYPE: ACUTE PAIN

## 2019-01-26 ASSESSMENT — PAIN DESCRIPTION - FREQUENCY: FREQUENCY: INTERMITTENT

## 2019-01-28 LAB — URINE CULTURE, ROUTINE: NORMAL

## 2019-03-07 ENCOUNTER — OFFICE VISIT (OUTPATIENT)
Dept: FAMILY MEDICINE CLINIC | Age: 69
End: 2019-03-07
Payer: MEDICARE

## 2019-03-07 VITALS
RESPIRATION RATE: 16 BRPM | SYSTOLIC BLOOD PRESSURE: 114 MMHG | TEMPERATURE: 98.1 F | WEIGHT: 170 LBS | OXYGEN SATURATION: 95 % | DIASTOLIC BLOOD PRESSURE: 70 MMHG | HEIGHT: 59 IN | HEART RATE: 86 BPM | BODY MASS INDEX: 34.27 KG/M2

## 2019-03-07 DIAGNOSIS — F41.9 ANXIETY AND DEPRESSION: ICD-10-CM

## 2019-03-07 DIAGNOSIS — E11.9 TYPE 2 DIABETES MELLITUS WITHOUT COMPLICATION, WITHOUT LONG-TERM CURRENT USE OF INSULIN (HCC): Primary | ICD-10-CM

## 2019-03-07 DIAGNOSIS — I10 ESSENTIAL HYPERTENSION: ICD-10-CM

## 2019-03-07 DIAGNOSIS — F32.A ANXIETY AND DEPRESSION: ICD-10-CM

## 2019-03-07 LAB — HBA1C MFR BLD: 6 %

## 2019-03-07 PROCEDURE — 1036F TOBACCO NON-USER: CPT | Performed by: NURSE PRACTITIONER

## 2019-03-07 PROCEDURE — 3044F HG A1C LEVEL LT 7.0%: CPT | Performed by: NURSE PRACTITIONER

## 2019-03-07 PROCEDURE — 1090F PRES/ABSN URINE INCON ASSESS: CPT | Performed by: NURSE PRACTITIONER

## 2019-03-07 PROCEDURE — 1101F PT FALLS ASSESS-DOCD LE1/YR: CPT | Performed by: NURSE PRACTITIONER

## 2019-03-07 PROCEDURE — G8598 ASA/ANTIPLAT THER USED: HCPCS | Performed by: NURSE PRACTITIONER

## 2019-03-07 PROCEDURE — G8482 FLU IMMUNIZE ORDER/ADMIN: HCPCS | Performed by: NURSE PRACTITIONER

## 2019-03-07 PROCEDURE — 83036 HEMOGLOBIN GLYCOSYLATED A1C: CPT | Performed by: NURSE PRACTITIONER

## 2019-03-07 PROCEDURE — G8400 PT W/DXA NO RESULTS DOC: HCPCS | Performed by: NURSE PRACTITIONER

## 2019-03-07 PROCEDURE — 99214 OFFICE O/P EST MOD 30 MIN: CPT | Performed by: NURSE PRACTITIONER

## 2019-03-07 PROCEDURE — G8427 DOCREV CUR MEDS BY ELIG CLIN: HCPCS | Performed by: NURSE PRACTITIONER

## 2019-03-07 PROCEDURE — G8417 CALC BMI ABV UP PARAM F/U: HCPCS | Performed by: NURSE PRACTITIONER

## 2019-03-07 PROCEDURE — 3017F COLORECTAL CA SCREEN DOC REV: CPT | Performed by: NURSE PRACTITIONER

## 2019-03-07 PROCEDURE — 2022F DILAT RTA XM EVC RTNOPTHY: CPT | Performed by: NURSE PRACTITIONER

## 2019-03-07 PROCEDURE — 1123F ACP DISCUSS/DSCN MKR DOCD: CPT | Performed by: NURSE PRACTITIONER

## 2019-03-07 PROCEDURE — 4040F PNEUMOC VAC/ADMIN/RCVD: CPT | Performed by: NURSE PRACTITIONER

## 2019-03-07 RX ORDER — GLUCOSAMINE HCL/CHONDROITIN SU 500-400 MG
CAPSULE ORAL
Qty: 200 STRIP | Refills: 5 | Status: SHIPPED | OUTPATIENT
Start: 2019-03-07 | End: 2019-12-16 | Stop reason: SDUPTHER

## 2019-03-07 RX ORDER — VENLAFAXINE HYDROCHLORIDE 75 MG/1
75 CAPSULE, EXTENDED RELEASE ORAL DAILY
Qty: 90 CAPSULE | Refills: 2 | Status: SHIPPED | OUTPATIENT
Start: 2019-03-07 | End: 2019-06-05

## 2019-03-07 RX ORDER — QUINAPRIL 5 1/1
5 TABLET ORAL NIGHTLY
Qty: 90 TABLET | Refills: 2 | Status: SHIPPED | OUTPATIENT
Start: 2019-03-07 | End: 2019-12-16 | Stop reason: SDUPTHER

## 2019-03-07 RX ORDER — FAMOTIDINE 20 MG/1
20 TABLET, FILM COATED ORAL 2 TIMES DAILY
Qty: 180 TABLET | Refills: 2 | Status: SHIPPED | OUTPATIENT
Start: 2019-03-07 | End: 2019-12-16 | Stop reason: SDUPTHER

## 2019-03-07 RX ORDER — PEN NEEDLE, DIABETIC 31 G X1/4"
1 NEEDLE, DISPOSABLE MISCELLANEOUS DAILY
Qty: 100 EACH | Refills: 5 | Status: SHIPPED | OUTPATIENT
Start: 2019-03-07 | End: 2019-12-16 | Stop reason: SDUPTHER

## 2019-03-07 RX ORDER — ATENOLOL 25 MG/1
50 TABLET ORAL DAILY
Qty: 180 TABLET | Refills: 2 | Status: SHIPPED | OUTPATIENT
Start: 2019-03-07 | End: 2019-11-28 | Stop reason: SDUPTHER

## 2019-03-07 RX ORDER — SIMVASTATIN 80 MG
80 TABLET ORAL NIGHTLY
Qty: 90 TABLET | Refills: 2 | Status: SHIPPED | OUTPATIENT
Start: 2019-03-07 | End: 2019-11-28 | Stop reason: SDUPTHER

## 2019-03-07 RX ORDER — LANCETS 30 GAUGE
EACH MISCELLANEOUS
Qty: 200 EACH | Refills: 2 | Status: SHIPPED | OUTPATIENT
Start: 2019-03-07 | End: 2019-07-09 | Stop reason: SDUPTHER

## 2019-03-07 ASSESSMENT — PATIENT HEALTH QUESTIONNAIRE - PHQ9
SUM OF ALL RESPONSES TO PHQ QUESTIONS 1-9: 0
SUM OF ALL RESPONSES TO PHQ9 QUESTIONS 1 & 2: 0
2. FEELING DOWN, DEPRESSED OR HOPELESS: 0
SUM OF ALL RESPONSES TO PHQ QUESTIONS 1-9: 0
DEPRESSION UNABLE TO ASSESS: FUNCTIONAL CAPACITY MOTIVATION LIMITS ACCURACY
1. LITTLE INTEREST OR PLEASURE IN DOING THINGS: 0

## 2019-03-07 ASSESSMENT — ENCOUNTER SYMPTOMS
WHEEZING: 0
NAUSEA: 0
VOMITING: 0
CONSTIPATION: 0
DIARRHEA: 0
SHORTNESS OF BREATH: 0
COUGH: 0

## 2019-04-23 ENCOUNTER — OFFICE VISIT (OUTPATIENT)
Dept: VASCULAR SURGERY | Age: 69
End: 2019-04-23
Payer: MEDICARE

## 2019-04-23 ENCOUNTER — HOSPITAL ENCOUNTER (OUTPATIENT)
Dept: CARDIOLOGY | Age: 69
Discharge: HOME | End: 2019-04-23
Admitting: SURGERY
Payer: MEDICARE

## 2019-04-23 DIAGNOSIS — I65.23 BILATERAL CAROTID ARTERY STENOSIS: Primary | ICD-10-CM

## 2019-04-23 DIAGNOSIS — I65.21 STENOSIS OF RIGHT CAROTID ARTERY: ICD-10-CM

## 2019-04-23 PROCEDURE — 93880 EXTRACRANIAL BILAT STUDY: CPT

## 2019-04-23 PROCEDURE — G8427 DOCREV CUR MEDS BY ELIG CLIN: HCPCS | Performed by: SURGERY

## 2019-04-23 PROCEDURE — G8417 CALC BMI ABV UP PARAM F/U: HCPCS | Performed by: SURGERY

## 2019-04-23 PROCEDURE — 1090F PRES/ABSN URINE INCON ASSESS: CPT | Performed by: SURGERY

## 2019-04-23 PROCEDURE — 99213 OFFICE O/P EST LOW 20 MIN: CPT | Performed by: SURGERY

## 2019-04-23 PROCEDURE — G8598 ASA/ANTIPLAT THER USED: HCPCS | Performed by: SURGERY

## 2019-04-23 PROCEDURE — 1036F TOBACCO NON-USER: CPT | Performed by: SURGERY

## 2019-04-23 PROCEDURE — 4040F PNEUMOC VAC/ADMIN/RCVD: CPT | Performed by: SURGERY

## 2019-04-23 PROCEDURE — 3017F COLORECTAL CA SCREEN DOC REV: CPT | Performed by: SURGERY

## 2019-04-23 PROCEDURE — G8400 PT W/DXA NO RESULTS DOC: HCPCS | Performed by: SURGERY

## 2019-04-23 PROCEDURE — 1123F ACP DISCUSS/DSCN MKR DOCD: CPT | Performed by: SURGERY

## 2019-04-23 NOTE — PROGRESS NOTES
ROXY Trejo CNP   Alcohol Swabs (B-D SINGLE USE SWABS REGULAR) PADS USE TO CLEAN FINGER PRIOR TO BLOOD SUGAR CHECK 18  Yes ROXY Arnold CNP   Blood Glucose Monitoring Suppl (ACCU-CHEK STEVEN PLUS) w/Device KIT CHECK BLOOD SUGAR TWICE DAILY 18  Yes ROXY Arnold CNP   nitroGLYCERIN (NITROSTAT) 0.4 MG SL tablet Place 1 tablet under the tongue every 5 minutes as needed for Chest pain 18  Yes Ron Blake MD   aspirin 81 MG EC tablet Take 81 mg by mouth daily. Yes Historical Provider, MD   acetaminophen (TYLENOL) 325 MG tablet Take 650 mg by mouth as needed    Yes Historical Provider, MD     Allergies:  Patient has no known allergies.     Social History     Socioeconomic History    Marital status:      Spouse name: Not on file    Number of children: Not on file    Years of education: Not on file    Highest education level: Not on file   Occupational History    Occupation: retired    Social Needs    Financial resource strain: Not on file    Food insecurity:     Worry: Not on file     Inability: Not on file   [employer] needs:     Medical: Not on file     Non-medical: Not on file   Tobacco Use    Smoking status: Former Smoker     Packs/day: 1.00     Years: 43.00     Pack years: 43.00     Start date:      Last attempt to quit: 3/18/2011     Years since quittin.1    Smokeless tobacco: Never Used   Substance and Sexual Activity    Alcohol use: No    Drug use: No    Sexual activity: Yes   Lifestyle    Physical activity:     Days per week: Not on file     Minutes per session: Not on file    Stress: Not on file   Relationships    Social connections:     Talks on phone: Not on file     Gets together: Not on file     Attends [employer] service: Not on file     Active member of club or organization: Not on file     Attends meetings of clubs or organizations: Not on file     Relationship status: Not on file    Intimate partner violence: Fear of current or ex partner: Not on file     Emotionally abused: Not on file     Physically abused: Not on file     Forced sexual activity: Not on file   Other Topics Concern    Not on file   Social History Narrative    Not on file        Family History   Problem Relation Age of Onset    Heart Disease Mother     Heart Disease Father     Diabetes Father     Heart Disease Sister     Heart Disease Maternal Grandmother     Heart Disease Paternal Grandmother     Diabetes Paternal Grandmother        REVIEW OF SYSTEMS (New symptoms):    Eyes:      Blurred vision:  No [x]/Yes []               Diplopia:   No [x]/Yes []               Vision loss:       No [x]/Yes []   Ears, nose, throat:             Hearing loss:    No [x]/Yes []      Vertigo:   No [x]/Yes []                       Swallowing problem:  No [x]/Yes []               Nose bleeds:   No [x]/Yes []      Voice hoarseness:  No [x]/Yes []  Respiratory:             Cough:   No [x]/Yes []      Pleuritic chest pain:  No [x]/Yes []                        Dyspnea:   No [x]/Yes []      Wheezing:   No [x]/Yes []  Cardiovascular:             Angina:   No [x]/Yes []      Palpitations:   No [x]/Yes []          Claudication:    No [x]/Yes []      Leg swelling:   No [x]/Yes []  Gastrointestinal:             Nausea or vomiting:  No [x]/Yes []               Abdominal pain:  No [x]/Yes []                     Intestinal bleeding: No [x]/Yes []  Musculoskeletal:             Leg pain:   No [x]/Yes []      Back pain:   No [x]/Yes []                    Weakness:   No [x]/Yes []  Neurologic:             Numbness:   No [x]/Yes []      Paralysis:   No [x]/Yes []                       Headaches:   No [x]/Yes []  Hematologic, lymphatic:   Anemia:   No [x]/Yes []              Bleeding or bruising:  No [x]/Yes []              Fevers or chills: No [x]/Yes []  Endocrine:             Temp intolerance:   No [x]/Yes []                       Polydipsia, polyuria:  No [x]/Yes []  Skin: Rash:    No [x]/Yes []      Ulcers:   No [x]/Yes []              Abnorm pigment: No [x]/Yes []  :              Frequency/urgency:  No [x]/Yes []      Hematuria:    No [x]/Yes []                      Incontinence:    No [x]/Yes []    PHYSICAL EXAM:  There were no vitals filed for this visit. General Appearance: alert and oriented to person, place and time, well developed and well- nourished, in no acute distress  Skin: warm and dry, no rash or erythema  Head: normocephalic and atraumatic  Eyes: extraocular eye movements intact, conjunctivae normal  ENT: external ear and ear canal normal bilaterally, nose without deformity  Pulmonary/Chest: clear to auscultation bilaterally- no wheezes, rales or rhonchi, normal air movement, no respiratory distress  Cardiovascular: normal rate, regular rhythm, normal S1 and S2, no murmurs, no carotid bruits  Abdomen: soft, non-tender, non-distended, normal bowel sounds, no masses or organomegaly  Musculoskeletal: normal range of motion, no joint swelling, deformity or tenderness  Neurologic: no cranial nerve deficit, gait, coordination and speech normal  Extremities: no leg edema bilaterally    PULSE EXAM      Right      Left   Brachial     Radial 3 3   Femoral     Popliteal 2 2   Dorsalis Pedis     Posterior Tibial     (3=normal, 2=diminished, 1=barely palpable, 4=widened)    RADIOLOGY: Carotid US:  Right 186 / 55 (1.9), 50-69%. Left 107 / 34 (0.9), 0-40%. Problem List Items Addressed This Visit     Carotid artery stenosis - Primary    Relevant Orders    VL DUP CAROTID BILATERAL          I reviewed with the patient that from my standpoint she can continue with all her normal activities. I will plan to see her again in two years but asked her to call sooner with any new problems. I reviewed the natural history and treatment options of carotid artery disease. I reviewed the signs and symptoms of stroke, and instructions if symptoms occur.     Return in about 2 years (around 4/23/2021) for testing.

## 2019-04-23 NOTE — PROGRESS NOTES
[facility] - [facility]    This is a pre read worksheet - prior to official physician interpretation    Anuel Mar  1950  Date of study: 4/23/19    Indication for study:  Carotid artery stenosis  Study : Bilateral Carotid Artery Duplex Examination    Duplex examination of the RIGHT carotid artery system identifies atherosclerotic plaque. The peak systolic velocity in internal carotid artery was 186 centimeters / second. The maximum end diastolic velocity was 55 centimeters / second. The ICA/CCA ratio is 1.9. The right vertebral artery has antegrade flow. Duplex examination of the LEFT carotid artery system identifies atherosclerotic plaque. The peak systolic velocity in internal carotid artery was 107 centimeters / second. The maximum end diastolic velocity was 34 centimeters / second. The ICA/CCA ratio is 0.9. The left vertebral artery has antegrade flow.       LAST STUDY  Rt 50-69  Lt 0-40

## 2019-05-15 ENCOUNTER — OFFICE VISIT (OUTPATIENT)
Dept: FAMILY MEDICINE CLINIC | Age: 69
End: 2019-05-15
Payer: MEDICARE

## 2019-05-15 VITALS
DIASTOLIC BLOOD PRESSURE: 74 MMHG | TEMPERATURE: 98.6 F | OXYGEN SATURATION: 95 % | BODY MASS INDEX: 34.47 KG/M2 | SYSTOLIC BLOOD PRESSURE: 112 MMHG | HEIGHT: 59 IN | HEART RATE: 86 BPM | WEIGHT: 171 LBS

## 2019-05-15 DIAGNOSIS — J06.9 VIRAL URI: ICD-10-CM

## 2019-05-15 PROCEDURE — 99213 OFFICE O/P EST LOW 20 MIN: CPT | Performed by: NURSE PRACTITIONER

## 2019-05-15 ASSESSMENT — ENCOUNTER SYMPTOMS
NAUSEA: 0
SINUS PAIN: 1
CONSTIPATION: 0
DIARRHEA: 0
CHEST TIGHTNESS: 0
EYE DISCHARGE: 1
COUGH: 1
SINUS PRESSURE: 1
ABDOMINAL DISTENTION: 0
WHEEZING: 0
EYE PAIN: 0
COLOR CHANGE: 0
SHORTNESS OF BREATH: 0
SORE THROAT: 1
VOMITING: 0

## 2019-05-15 NOTE — PROGRESS NOTES
HPI:  The patient is a 71 y.o. female who presents today to establish care. The patient complains of sinus pain, pressure, cough and runny eyes for the last two days. Her  and son were sick last week but seem to have been better. [facility] has been taking her , lately it was 128 this morning. She feels that it has been higher lately for her. She has never had to use her NITRO pills, in fact she usually has to flush them.    Past Medical History:   Diagnosis Date    Acute MI ([address] 08    CAD (coronary artery disease)     Carotid artery occlusion     Diverticulitis     Hyperlipidemia     Hypertension     Osteoarthritis     Thyroid nodule       Past Surgical History:   Procedure Laterality Date    CARPAL TUNNEL RELEASE      (L)    DIAGNOSTIC CARDIAC CATH LAB PROCEDURE      HYSTERECTOMY        Family History   Problem Relation Age of Onset    Heart Disease Mother     Heart Disease Father     Diabetes Father     Heart Disease Sister     Heart Disease Maternal Grandmother     Heart Disease Paternal Grandmother     Diabetes Paternal Grandmother      Social History     Socioeconomic History    Marital status:      Spouse name: Not on file    Number of children: Not on file    Years of education: Not on file    Highest education level: Not on file   Occupational History    Occupation: retired    Social Needs    Financial resource strain: Not on file    Food insecurity:     Worry: Not on file     Inability: Not on file   [employer] needs:     Medical: Not on file     Non-medical: Not on file   Tobacco Use    Smoking status: Former Smoker     Packs/day: 1.00     Years: 43.00     Pack years: 43.00     Start date:      Last attempt to quit: 3/18/2011     Years since quittin.1    Smokeless tobacco: Never Used   Substance and Sexual Activity    Alcohol use: No    Drug use: No    Sexual activity: Yes   Lifestyle    Physical activity:     Days per week: Not

## 2019-06-05 ENCOUNTER — OFFICE VISIT (OUTPATIENT)
Dept: FAMILY MEDICINE CLINIC | Age: 69
End: 2019-06-05
Payer: MEDICARE

## 2019-06-05 VITALS
DIASTOLIC BLOOD PRESSURE: 80 MMHG | HEART RATE: 76 BPM | SYSTOLIC BLOOD PRESSURE: 130 MMHG | HEIGHT: 59 IN | OXYGEN SATURATION: 95 % | RESPIRATION RATE: 16 BRPM | WEIGHT: 173 LBS | BODY MASS INDEX: 34.88 KG/M2 | TEMPERATURE: 98.1 F

## 2019-06-05 DIAGNOSIS — E11.9 TYPE 2 DIABETES MELLITUS WITHOUT COMPLICATION, WITHOUT LONG-TERM CURRENT USE OF INSULIN (HCC): Primary | ICD-10-CM

## 2019-06-05 DIAGNOSIS — F41.9 ANXIETY AND DEPRESSION: ICD-10-CM

## 2019-06-05 DIAGNOSIS — G89.29 CHRONIC PAIN OF BOTH KNEES: ICD-10-CM

## 2019-06-05 DIAGNOSIS — F32.A ANXIETY AND DEPRESSION: ICD-10-CM

## 2019-06-05 DIAGNOSIS — M25.562 CHRONIC PAIN OF BOTH KNEES: ICD-10-CM

## 2019-06-05 DIAGNOSIS — M25.561 CHRONIC PAIN OF BOTH KNEES: ICD-10-CM

## 2019-06-05 LAB — HBA1C MFR BLD: 6 %

## 2019-06-05 PROCEDURE — 83036 HEMOGLOBIN GLYCOSYLATED A1C: CPT | Performed by: NURSE PRACTITIONER

## 2019-06-05 PROCEDURE — 99214 OFFICE O/P EST MOD 30 MIN: CPT | Performed by: NURSE PRACTITIONER

## 2019-06-05 RX ORDER — VENLAFAXINE HYDROCHLORIDE 150 MG/1
150 CAPSULE, EXTENDED RELEASE ORAL DAILY
Qty: 30 CAPSULE | Refills: 3 | Status: SHIPPED | OUTPATIENT
Start: 2019-06-05 | End: 2019-08-12 | Stop reason: SDUPTHER

## 2019-06-05 ASSESSMENT — ENCOUNTER SYMPTOMS
EYE DISCHARGE: 0
COLOR CHANGE: 0
NAUSEA: 0
DIARRHEA: 0
CONSTIPATION: 0
VOMITING: 0
SORE THROAT: 0
SINUS PAIN: 0
COUGH: 0
STRIDOR: 0
CHEST TIGHTNESS: 0
WHEEZING: 0

## 2019-06-05 NOTE — PROGRESS NOTES
HPI:  The patient is a 71 y.o. female who presents today to establish care, discuss medications and referrals. The patient complains of bilateral, chronic knee pain & swelling in her hands. She does not drink coffee, but has had tea before arriving today. She is not aware of why her BP is up today. She does not take her BP at home, at the store it is normal, takes medication daily. History of heart disease. She has a stent and follows up with [employer] every 2 years, but she is feeling fine. She is continuing to follow with Dr. Lauro Clayton for her carotids. She takes her BS daily, they are higher lately 112 in the AM. She does not take a bedtime snack, drinks tea/sugar. She is doing the 1.2 of the Victoza. She has been taking the Effexor for several years, family notes she is irritated more and crying more and more these days. Does not feel like getting out of bed all the time.      Manages her GERD, does not follow any particular diet or exercise program.    Past Medical History:   Diagnosis Date    Acute MI ([address] 07/11/08    CAD (coronary artery disease)     Carotid artery occlusion     Diverticulitis     Hyperlipidemia     Hypertension     Osteoarthritis     Thyroid nodule       Past Surgical History:   Procedure Laterality Date    CARPAL TUNNEL RELEASE      (L)    DIAGNOSTIC CARDIAC CATH LAB PROCEDURE      HYSTERECTOMY        Family History   Problem Relation Age of Onset    Heart Disease Mother     Heart Disease Father     Diabetes Father     Heart Disease Sister     Heart Disease Maternal Grandmother     Heart Disease Paternal Grandmother     Diabetes Paternal Grandmother      Social History     Socioeconomic History    Marital status:      Spouse name: Not on file    Number of children: Not on file    Years of education: Not on file    Highest education level: Not on file   Occupational History    Occupation: retired    Social Needs    Financial resource strain: Not on file    Food insecurity:     Worry: Not on file     Inability: Not on file    Transportation needs:     Medical: Not on file     Non-medical: Not on file   Tobacco Use    Smoking status: Former Smoker     Packs/day: 1.00     Years: 43.00     Pack years: 43.00     Start date:      Last attempt to quit: 3/18/2011     Years since quittin.2    Smokeless tobacco: Never Used   Substance and Sexual Activity    Alcohol use: No    Drug use: No    Sexual activity: Yes   Lifestyle    Physical activity:     Days per week: Not on file     Minutes per session: Not on file    Stress: Not on file   Relationships    Social connections:     Talks on phone: Not on file     Gets together: Not on file     Attends [employer] service: Not on file     Active member of club or organization: Not on file     Attends meetings of clubs or organizations: Not on file     Relationship status: Not on file    Intimate partner violence:     Fear of current or ex partner: Not on file     Emotionally abused: Not on file     Physically abused: Not on file     Forced sexual activity: Not on file   Other Topics Concern    Not on file   Social History Narrative    Not on file        No Known Allergies     Prior to Visit Medications    Medication Sig Taking?  Authorizing Provider   venlafaxine (EFFEXOR XR) 150 MG extended release capsule Take 1 capsule by mouth daily Yes ROXY Kaur CNP   atenolol (TENORMIN) 25 MG tablet Take 2 tablets by mouth daily Yes ROXY Ragsdale CNP   blood glucose monitor strips PT TESTS TWICE DAILY Yes ROXY Ragsdale CNP   famotidine (PEPCID) 20 MG tablet Take 1 tablet by mouth 2 times daily Yes ROXY Ragsdale CNP   Insulin Pen Needle (PEN NEEDLES) 31G X 6 MM MISC 1 each by Does not apply route daily Yes ROXY Ragsdale CNP   Lancets MISC PT TESTS TWICE DAILY Yes ROXY Ragsdale CNP   quinapril (ACCUPRIL) 5 MG tablet Take 1 tablet by mouth nightly Yes DensiseROXY Alcala CNP   simvastatin (ZOCOR) 80 MG tablet Take 1 tablet by mouth nightly Yes Denisse ROXY Webb CNP   Liraglutide ([address]) 18 MG/3ML SOPN SC injection INJECT 1.2 MG SUBCUTANEOUSLY DAILY Yes ROXY Betancourt CNP   Alcohol Swabs (B-D SINGLE USE SWABS REGULAR) PADS USE TO CLEAN FINGER PRIOR TO BLOOD SUGAR CHECK Yes ROXY Betancourt CNP   Blood Glucose Monitoring Suppl (ACCU-CHEK STEVEN PLUS) w/Device KIT CHECK BLOOD SUGAR TWICE DAILY Yes ROXY Betancourt CNP   nitroGLYCERIN (NITROSTAT) 0.4 MG SL tablet Place 1 tablet under the tongue every 5 minutes as needed for Chest pain Yes Nuvia Salter MD   aspirin 81 MG EC tablet Take 81 mg by mouth daily. Yes Historical Provider, MD   acetaminophen (TYLENOL) 325 MG tablet Take 650 mg by mouth as needed  Yes Historical Provider, MD       Review of Systems:    Review of Systems   Constitutional: Positive for activity change (More depression lately, but moves when can). Negative for appetite change, chills and fever. HENT: Negative for congestion, sinus pain and sore throat. Eyes: Negative for discharge and visual disturbance. Respiratory: Negative for cough, chest tightness, wheezing and stridor. Cardiovascular: Negative for chest pain, palpitations and leg swelling. Gastrointestinal: Negative for constipation, diarrhea, nausea and vomiting. Endocrine: Positive for cold intolerance and heat intolerance. Negative for polydipsia, polyphagia and polyuria. Genitourinary: Negative for difficulty urinating. Musculoskeletal: Positive for joint swelling and myalgias (She has seen ortho in the past, has not had it drained. ). Negative for gait problem. Skin: Negative for color change, rash and wound. Neurological: Negative for dizziness, light-headedness and headaches. Hematological: Negative for adenopathy. Does not bruise/bleed easily.    Psychiatric/Behavioral: Positive for agitation and sleep disturbance (Chronic.). The patient is nervous/anxious. BP Readings from Last 1 Encounters:   06/05/19 130/80    Recheck if >140/90  Hemoglobin A1C (%)   Date Value   06/05/2019 6.0     No results found for: LABMICR    Have you had your Pneumonia Vaccine N/A    Have you had a Colorectal Screening  No, will consider it. Have you had a Screening Mammogram  yes    Have you seen any other physician or provider since your last visit yes -     Have you had any other diagnostic tests since your last visit? yes -     Physical Exam:    Vitals:    06/05/19 1115   BP: 130/80   Pulse: 76   Resp: 16   Temp: 98.1 °F (36.7 °C)   TempSrc: Oral   SpO2: 95%   Weight: 173 lb (78.5 kg)   Height: 4' 11\" (1.499 m)     Physical Exam   Constitutional: She is oriented to person, place, and time. She appears well-developed and well-nourished. HENT:   Head: Normocephalic and atraumatic. Eyes: Pupils are equal, round, and reactive to light. EOM are normal.   Neck: Normal range of motion. Neck supple. No thyromegaly present. Cardiovascular: Normal rate, regular rhythm, normal heart sounds and intact distal pulses. Pulmonary/Chest: Effort normal and breath sounds normal.   Abdominal: Soft. Bowel sounds are normal.   Musculoskeletal: Normal range of motion. She exhibits edema and tenderness. Left hand: She exhibits tenderness and swelling. Lymphadenopathy:     She has no cervical adenopathy. Neurological: She is alert and oriented to person, place, and time. Skin: Skin is warm and dry. Capillary refill takes less than 2 seconds. Psychiatric: She has a normal mood and affect. Judgment normal.   Nursing note and vitals reviewed. Assessment/Plan:    Alonzo Massey was seen today for diabetes, depression and arthritis.     Diagnoses and all orders for this visit:    Type 2 diabetes mellitus without complication, without long-term current use of insulin (HCC)  -     POCT glycosylated hemoglobin (Hb A1C)  - The current

## 2019-07-09 ENCOUNTER — OFFICE VISIT (OUTPATIENT)
Dept: FAMILY MEDICINE CLINIC | Age: 69
End: 2019-07-09
Payer: MEDICARE

## 2019-07-09 VITALS
HEIGHT: 59 IN | DIASTOLIC BLOOD PRESSURE: 72 MMHG | SYSTOLIC BLOOD PRESSURE: 118 MMHG | TEMPERATURE: 98.1 F | RESPIRATION RATE: 16 BRPM | OXYGEN SATURATION: 98 % | HEART RATE: 79 BPM | BODY MASS INDEX: 34.68 KG/M2 | WEIGHT: 172 LBS

## 2019-07-09 DIAGNOSIS — F41.9 ANXIETY AND DEPRESSION: Primary | ICD-10-CM

## 2019-07-09 DIAGNOSIS — F32.A ANXIETY AND DEPRESSION: Primary | ICD-10-CM

## 2019-07-09 DIAGNOSIS — E11.9 TYPE 2 DIABETES MELLITUS WITHOUT COMPLICATION, WITHOUT LONG-TERM CURRENT USE OF INSULIN (HCC): ICD-10-CM

## 2019-07-09 PROCEDURE — 99213 OFFICE O/P EST LOW 20 MIN: CPT | Performed by: NURSE PRACTITIONER

## 2019-07-09 RX ORDER — LANCETS 30 GAUGE
EACH MISCELLANEOUS
Qty: 200 EACH | Refills: 2 | Status: SHIPPED | OUTPATIENT
Start: 2019-07-09 | End: 2019-12-16 | Stop reason: SDUPTHER

## 2019-07-09 ASSESSMENT — ENCOUNTER SYMPTOMS
EYE DISCHARGE: 0
DIARRHEA: 0
CONSTIPATION: 0
COLOR CHANGE: 0
ABDOMINAL PAIN: 0

## 2019-07-09 NOTE — PROGRESS NOTES
HPI:  Patient comes in today for   Chief Complaint   Patient presents with    Diabetes     1 month follow up. .    She has been doing well, less irritable and sleeping. She also is drinking more water. She continues to monitor her BS, requests more supplies today from [facility]. She has an appointment with Dr. Mitch Dudley coming up in July. Prior to Visit Medications    Medication Sig Taking? Authorizing Provider   Lancets MISC PT TESTS TWICE DAILY Yes ROXY Parker CNP   venlafaxine (EFFEXOR XR) 150 MG extended release capsule Take 1 capsule by mouth daily Yes ROXY Parker CNP   atenolol (TENORMIN) 25 MG tablet Take 2 tablets by mouth daily Yes ROXY Noel CNP   blood glucose monitor strips PT TESTS TWICE DAILY Yes ROXY Noel CNP   famotidine (PEPCID) 20 MG tablet Take 1 tablet by mouth 2 times daily Yes ROXY Noel CNP   Insulin Pen Needle (PEN NEEDLES) 31G X 6 MM MISC 1 each by Does not apply route daily Yes ROXY Noel CNP   quinapril (ACCUPRIL) 5 MG tablet Take 1 tablet by mouth nightly Yes ROXY Noel CNP   simvastatin (ZOCOR) 80 MG tablet Take 1 tablet by mouth nightly Yes ROXY Noel CNP   Liraglutide ([place]) 18 MG/3ML SOPN SC injection INJECT 1.2 MG SUBCUTANEOUSLY DAILY Yes ROXY Noel CNP   Alcohol Swabs (B-D SINGLE USE SWABS REGULAR) PADS USE TO CLEAN FINGER PRIOR TO BLOOD SUGAR CHECK Yes ROXY Noel CNP   Blood Glucose Monitoring Suppl (ACCU-CHEK STEVEN PLUS) w/Device KIT CHECK BLOOD SUGAR TWICE DAILY Yes ROXY Noel CNP   nitroGLYCERIN (NITROSTAT) 0.4 MG SL tablet Place 1 tablet under the tongue every 5 minutes as needed for Chest pain Yes Trenton Au MD   aspirin 81 MG EC tablet Take 81 mg by mouth daily.    Yes Historical Provider, MD   acetaminophen (TYLENOL) 325 MG tablet Take 650 mg by mouth as needed  Yes Historical Provider, MD     No Known

## 2019-07-22 DIAGNOSIS — M25.561 PAIN IN BOTH KNEES, UNSPECIFIED CHRONICITY: Primary | ICD-10-CM

## 2019-07-22 DIAGNOSIS — M25.562 PAIN IN BOTH KNEES, UNSPECIFIED CHRONICITY: Primary | ICD-10-CM

## 2019-07-24 ENCOUNTER — OFFICE VISIT (OUTPATIENT)
Dept: ORTHOPEDIC SURGERY | Age: 69
End: 2019-07-24
Payer: MEDICARE

## 2019-07-24 ENCOUNTER — HOSPITAL ENCOUNTER (OUTPATIENT)
Dept: GENERAL RADIOLOGY | Age: 69
Discharge: HOME | End: 2019-07-26
Payer: MEDICARE

## 2019-07-24 ENCOUNTER — HOSPITAL ENCOUNTER (OUTPATIENT)
Age: 69
Discharge: HOME | End: 2019-07-24
Payer: MEDICARE

## 2019-07-24 ENCOUNTER — HOSPITAL ENCOUNTER (OUTPATIENT)
Age: 69
End: 2019-07-24
Payer: MEDICARE

## 2019-07-24 VITALS
SYSTOLIC BLOOD PRESSURE: 139 MMHG | DIASTOLIC BLOOD PRESSURE: 83 MMHG | HEART RATE: 76 BPM | HEIGHT: 59 IN | BODY MASS INDEX: 34.68 KG/M2 | WEIGHT: 172 LBS

## 2019-07-24 DIAGNOSIS — M25.561 PAIN IN BOTH KNEES, UNSPECIFIED CHRONICITY: ICD-10-CM

## 2019-07-24 DIAGNOSIS — M79.642 LEFT HAND PAIN: ICD-10-CM

## 2019-07-24 DIAGNOSIS — M25.562 PAIN IN BOTH KNEES, UNSPECIFIED CHRONICITY: ICD-10-CM

## 2019-07-24 DIAGNOSIS — M25.50 POLYARTHRALGIA: ICD-10-CM

## 2019-07-24 DIAGNOSIS — M25.561 CHRONIC PAIN OF RIGHT KNEE: ICD-10-CM

## 2019-07-24 DIAGNOSIS — M17.0 ARTHRITIS OF BOTH KNEES: ICD-10-CM

## 2019-07-24 DIAGNOSIS — M79.89 SWELLING OF LEFT HAND: ICD-10-CM

## 2019-07-24 DIAGNOSIS — E04.1 THYROID NODULE: ICD-10-CM

## 2019-07-24 DIAGNOSIS — M25.562 CHRONIC PAIN OF LEFT KNEE: ICD-10-CM

## 2019-07-24 DIAGNOSIS — G89.29 CHRONIC PAIN OF RIGHT KNEE: ICD-10-CM

## 2019-07-24 DIAGNOSIS — M79.642 LEFT HAND PAIN: Primary | ICD-10-CM

## 2019-07-24 DIAGNOSIS — G89.29 CHRONIC PAIN OF LEFT KNEE: ICD-10-CM

## 2019-07-24 LAB
BASOPHILS ABSOLUTE: 0.06 E9/L (ref 0–0.2)
BASOPHILS RELATIVE PERCENT: 1 % (ref 0–2)
C-REACTIVE PROTEIN: <0.1 MG/DL (ref 0–0.4)
EOSINOPHILS ABSOLUTE: 0.23 E9/L (ref 0.05–0.5)
EOSINOPHILS RELATIVE PERCENT: 3.8 % (ref 0–6)
HCT VFR BLD CALC: 39.5 % (ref 34–48)
HEMOGLOBIN: 12.8 G/DL (ref 11.5–15.5)
IMMATURE GRANULOCYTES #: 0.02 E9/L
IMMATURE GRANULOCYTES %: 0.3 % (ref 0–5)
LYMPHOCYTES ABSOLUTE: 1.99 E9/L (ref 1.5–4)
LYMPHOCYTES RELATIVE PERCENT: 32.9 % (ref 20–42)
MCH RBC QN AUTO: 30 PG (ref 26–35)
MCHC RBC AUTO-ENTMCNC: 32.4 % (ref 32–34.5)
MCV RBC AUTO: 92.7 FL (ref 80–99.9)
MONOCYTES ABSOLUTE: 0.71 E9/L (ref 0.1–0.95)
MONOCYTES RELATIVE PERCENT: 11.7 % (ref 2–12)
NEUTROPHILS ABSOLUTE: 3.04 E9/L (ref 1.8–7.3)
NEUTROPHILS RELATIVE PERCENT: 50.3 % (ref 43–80)
PDW BLD-RTO: 12.5 FL (ref 11.5–15)
PLATELET # BLD: 235 E9/L (ref 130–450)
PMV BLD AUTO: 9.2 FL (ref 7–12)
RBC # BLD: 4.26 E12/L (ref 3.5–5.5)
RHEUMATOID FACTOR: <10 IU/ML (ref 0–13)
SEDIMENTATION RATE, ERYTHROCYTE: 44 MM/HR (ref 0–20)
T4 FREE: 1.06 NG/DL (ref 0.93–1.7)
TSH SERPL DL<=0.05 MIU/L-ACNC: 0.32 UIU/ML (ref 0.27–4.2)
URIC ACID, SERUM: 5.1 MG/DL (ref 2.4–5.7)
WBC # BLD: 6.1 E9/L (ref 4.5–11.5)

## 2019-07-24 PROCEDURE — 85651 RBC SED RATE NONAUTOMATED: CPT

## 2019-07-24 PROCEDURE — 86140 C-REACTIVE PROTEIN: CPT

## 2019-07-24 PROCEDURE — 1090F PRES/ABSN URINE INCON ASSESS: CPT | Performed by: PHYSICIAN ASSISTANT

## 2019-07-24 PROCEDURE — 86431 RHEUMATOID FACTOR QUANT: CPT

## 2019-07-24 PROCEDURE — G8417 CALC BMI ABV UP PARAM F/U: HCPCS | Performed by: PHYSICIAN ASSISTANT

## 2019-07-24 PROCEDURE — 84550 ASSAY OF BLOOD/URIC ACID: CPT

## 2019-07-24 PROCEDURE — 1036F TOBACCO NON-USER: CPT | Performed by: PHYSICIAN ASSISTANT

## 2019-07-24 PROCEDURE — 4040F PNEUMOC VAC/ADMIN/RCVD: CPT | Performed by: PHYSICIAN ASSISTANT

## 2019-07-24 PROCEDURE — 73560 X-RAY EXAM OF KNEE 1 OR 2: CPT

## 2019-07-24 PROCEDURE — 73120 X-RAY EXAM OF HAND: CPT

## 2019-07-24 PROCEDURE — 3017F COLORECTAL CA SCREEN DOC REV: CPT | Performed by: PHYSICIAN ASSISTANT

## 2019-07-24 PROCEDURE — 84443 ASSAY THYROID STIM HORMONE: CPT

## 2019-07-24 PROCEDURE — G8598 ASA/ANTIPLAT THER USED: HCPCS | Performed by: PHYSICIAN ASSISTANT

## 2019-07-24 PROCEDURE — 99202 OFFICE O/P NEW SF 15 MIN: CPT | Performed by: PHYSICIAN ASSISTANT

## 2019-07-24 PROCEDURE — 86038 ANTINUCLEAR ANTIBODIES: CPT

## 2019-07-24 PROCEDURE — 36415 COLL VENOUS BLD VENIPUNCTURE: CPT

## 2019-07-24 PROCEDURE — 1123F ACP DISCUSS/DSCN MKR DOCD: CPT | Performed by: PHYSICIAN ASSISTANT

## 2019-07-24 PROCEDURE — G8400 PT W/DXA NO RESULTS DOC: HCPCS | Performed by: PHYSICIAN ASSISTANT

## 2019-07-24 PROCEDURE — G8427 DOCREV CUR MEDS BY ELIG CLIN: HCPCS | Performed by: PHYSICIAN ASSISTANT

## 2019-07-24 PROCEDURE — 86812 HLA TYPING A B OR C: CPT

## 2019-07-24 PROCEDURE — 99204 OFFICE O/P NEW MOD 45 MIN: CPT | Performed by: PHYSICIAN ASSISTANT

## 2019-07-24 PROCEDURE — 86618 LYME DISEASE ANTIBODY: CPT

## 2019-07-24 PROCEDURE — 84439 ASSAY OF FREE THYROXINE: CPT

## 2019-07-24 PROCEDURE — 85025 COMPLETE CBC W/AUTO DIFF WBC: CPT

## 2019-07-24 RX ORDER — NAPROXEN 500 MG/1
500 TABLET ORAL 2 TIMES DAILY WITH MEALS
Qty: 60 TABLET | Refills: 0 | Status: SHIPPED | OUTPATIENT
Start: 2019-07-24 | End: 2019-09-09

## 2019-07-25 LAB — ANTI-NUCLEAR ANTIBODY (ANA): NEGATIVE

## 2019-07-26 LAB — HLA B27: NEGATIVE

## 2019-07-26 NOTE — PROGRESS NOTES
acetaminophen (TYLENOL) 325 MG tablet Take 650 mg by mouth as needed        No current facility-administered medications for this visit. Allergies: Patient has no known allergies. Past Medical History:   Diagnosis Date    Acute MI ([address] 08    CAD (coronary artery disease)     Carotid artery occlusion     Diverticulitis     Hyperlipidemia     Hypertension     Osteoarthritis     Thyroid nodule      Past Surgical History:   Procedure Laterality Date    CARPAL TUNNEL RELEASE      (L)    DIAGNOSTIC CARDIAC CATH LAB PROCEDURE      HYSTERECTOMY      KNEE SURGERY  2017    LT knee torn meniscus / DR Espinal       Family History   Problem Relation Age of Onset    Heart Disease Mother     Heart Disease Father     Diabetes Father     Heart Disease Sister     Heart Disease Maternal Grandmother     Heart Disease Paternal Grandmother     Diabetes Paternal Grandmother      Social History     Tobacco Use    Smoking status: Former Smoker     Packs/day: 1.00     Years: 43.00     Pack years: 43.00     Start date:      Last attempt to quit: 3/18/2011     Years since quittin.3    Smokeless tobacco: Never Used   Substance Use Topics    Alcohol use: No                             Chief Complaint   Patient presents with    New Patient     Referred by Dr. Jody Browne Knee Pain     Chronic LT > RT knee pain x 1 yr (lt knee torn meniscus x 2 yrs ago)  / patient brought MRI disc    Hand Pain     LT hand pain / swelling x 1 mos / no known injury       SUBJECTIVE:   This is a 66-year-old female presented to the office with complaints of bilateral knee pain, left greater than right. This is been ongoing for some time, denies any acute injury bring on her symptoms.   Does have a known history of bilateral knee arthritis, has been seen by another orthopedic surgeon in the past.  Has had limited conservative treatment thus far, has tried anti-inflammatories, does have a history of previous left knee weakness, light-headedness, no TIA or stroke symptoms. No numbness and headaches. Psychiatric/Behavioral: Negative. OBJECTIVE:      Physical Examination:   General appearance: alert, well appearing, and in no distress,  normal appearing weight. No visible signs of trauma   Mental status: alert, oriented to person, place, and time, normal mood, behavior, speech, dress, motor activity, and thought processes  Abdomen: soft, nondistended  Resp:   resp easy and unlabored, no audible wheezes note, normal symmetrical expansion of both hemithoraces  Cardiac: distal pulses palpable, skin and extremities well perfused  Neurological: alert, oriented X3, normal speech, no focal findings or movement disorder noted, motor and sensory grossly normal bilaterally, normal muscle tone, no tremors, strength 5/5, normal gait and station  HEENT: normochephalic atraumatic, external ears and eyes normal, sclera normal, neck supple, no nasal discharge. Extremities:   peripheral pulses normal, no edema, redness or tenderness in the calves   Skin: normal coloration, no rashes or open wounds, no suspicious skin lesions noted  Psych: Affect euthymic   Musculoskeletal:   Extremity:  left Upper Extremity Exam:    Skin intact , not broken down, no signs of infection. No erythema/induration/fluctuence present. mild swelling present, between the 2nd-3rd MCP, no underlyig fluctuance appreciated. no ecchymosis present. Tenderness to palpation about the about the 2nd-3rd MCP heads, no shortening or malrotation of the fingers noted. No ulnar deviation. No extension tendon subluxation. Full ROM of the fingers noted, FDS and FDP intact. Palpable distal pulses, cap refill brisk in all digits. Demonstrates active range of motion of all digits. Motor function intact to anterior interosseous/posterior interosseous/ulnar distributions to hand. Sensation intact to touch in radial/ulnar/median nerve distributions to hand.  Compartments supple

## 2019-07-27 LAB — LYME, EIA: 0.17 LIV (ref 0–1.2)

## 2019-08-12 DIAGNOSIS — F41.9 ANXIETY AND DEPRESSION: ICD-10-CM

## 2019-08-12 DIAGNOSIS — F32.A ANXIETY AND DEPRESSION: ICD-10-CM

## 2019-08-13 RX ORDER — VENLAFAXINE HYDROCHLORIDE 150 MG/1
CAPSULE, EXTENDED RELEASE ORAL
Qty: 90 CAPSULE | Refills: 3 | Status: SHIPPED | OUTPATIENT
Start: 2019-08-13 | End: 2019-12-16 | Stop reason: SDUPTHER

## 2019-08-28 ENCOUNTER — OFFICE VISIT (OUTPATIENT)
Dept: ORTHOPEDIC SURGERY | Age: 69
End: 2019-08-28
Payer: MEDICARE

## 2019-08-28 VITALS
RESPIRATION RATE: 17 BRPM | SYSTOLIC BLOOD PRESSURE: 142 MMHG | BODY MASS INDEX: 35.68 KG/M2 | HEIGHT: 58 IN | HEART RATE: 69 BPM | WEIGHT: 170 LBS | DIASTOLIC BLOOD PRESSURE: 87 MMHG

## 2019-08-28 DIAGNOSIS — M79.642 LEFT HAND PAIN: ICD-10-CM

## 2019-08-28 DIAGNOSIS — M17.0 ARTHRITIS OF BOTH KNEES: Primary | ICD-10-CM

## 2019-08-28 PROCEDURE — G8400 PT W/DXA NO RESULTS DOC: HCPCS | Performed by: PHYSICIAN ASSISTANT

## 2019-08-28 PROCEDURE — 1123F ACP DISCUSS/DSCN MKR DOCD: CPT | Performed by: PHYSICIAN ASSISTANT

## 2019-08-28 PROCEDURE — 99213 OFFICE O/P EST LOW 20 MIN: CPT | Performed by: PHYSICIAN ASSISTANT

## 2019-08-28 PROCEDURE — G8598 ASA/ANTIPLAT THER USED: HCPCS | Performed by: PHYSICIAN ASSISTANT

## 2019-08-28 PROCEDURE — 99212 OFFICE O/P EST SF 10 MIN: CPT

## 2019-08-28 PROCEDURE — G8417 CALC BMI ABV UP PARAM F/U: HCPCS | Performed by: PHYSICIAN ASSISTANT

## 2019-08-28 PROCEDURE — 3017F COLORECTAL CA SCREEN DOC REV: CPT | Performed by: PHYSICIAN ASSISTANT

## 2019-08-28 PROCEDURE — 1036F TOBACCO NON-USER: CPT | Performed by: PHYSICIAN ASSISTANT

## 2019-08-28 PROCEDURE — 4040F PNEUMOC VAC/ADMIN/RCVD: CPT | Performed by: PHYSICIAN ASSISTANT

## 2019-08-28 PROCEDURE — 1090F PRES/ABSN URINE INCON ASSESS: CPT | Performed by: PHYSICIAN ASSISTANT

## 2019-08-28 PROCEDURE — G8427 DOCREV CUR MEDS BY ELIG CLIN: HCPCS | Performed by: PHYSICIAN ASSISTANT

## 2019-08-28 NOTE — PROGRESS NOTES
Chief Complaint   Patient presents with    Knee Pain     f/u on bilateral knees. Still having pain.  Hand Injury     f/u on left hand. Fingers hurts. SUBJECTIVE:  This is a 60-year-old female presented to the office for recheck of bilateral knee pain L>R as well as left hand acute swelling and pain. Here today to review her RA panel. She feels her symptoms are the same, denies any improvement in swelling of hands. She denies obtaining a medial  brace for the left knee as discussed stated it was too expensive. She also denies picking up naproxen and taking it she states she was concerned about the side effects, since she is a diabetic as well as has a history of heart condition. When trying to inquire further with the patient what she has tried since her last visit or further questioning about her symptoms she states \"its the same\" States she has continued exercises provided by her grandson. Denies any other orthopedic complaints or paresthesias on exam. Denies any constitutional symptoms. Review of Systems   Constitutional: Negative for fever, chills, diaphoresis, appetite change and fatigue. HENT: Negative for dental issues, hearing loss and tinnitus. Negative for congestion, sinus pressure, sneezing, sore throat. Negative for headache. Eyes: Negative for visual disturbance, blurred and double vision. Negative for pain, discharge, redness and itching  Respiratory: Negative for cough, shortness of breath and wheezing. Cardiovascular: Negative for chest pain, palpitations and leg swelling. No dyspnea on exertion   Gastrointestinal:   Negative for nausea, vomiting, abdominal pain, diarrhea, constipation  or black or bloody. Hematologic\Lymphatic:  negative for bleeding, petechiae,   Genitourinary: Negative for hematuria and difficulty urinating. Musculoskeletal: Negative for neck pain and stiffness. Negative for back pain, see HPI  Skin: Negative for pallor, rash and wound. Sensation intact to touch in radial/ulnar/median nerve distributions to hand. Compartments supple throughout arm and forearm.     Bilateral Knee Exam:     Skin intact. no effusion noted, bilaterally. No erythema/induration/fluctuence. Medial joint line TTP, worse on left than right. Stable to varus and valgus at 0 and 30 degrees of flexion. Negative Lachman's and posterior drawer. Negative patellar grind test and J sign. Compartments soft and compressible throughout leg. Active range of motion 0-110 degrees bilaterally, positive crepitus. Demonstrates active ankle plantar/dorsiflexion/great toe extension. Sensation intact to light touch sural/deep peroneal/superficial peroneal/saphenous/posterior tibial nerve distributions to foot/ankle. Palpable dorsalis pedis and posterior tibialis pulses. Ambulating well without assistive devices. BP (!) 142/87 Comment: nervous here today  Pulse 69   Resp 17   Ht 4' 10\" (1.473 m)   Wt 170 lb (77.1 kg)   BMI 35.53 kg/m²      XR: 8/28/19   Xrays reviewed from previous visit x-rays left hand demonstrated no erosive qualities of the metacarpal heads, no acute fracture, pathology, or bony deformity. No significant degenerative change was appreciated. X-rays of the bilateral knees again demonstrated mild arthritic change worse on the left than the right. FRANCK was negative  CBC was within normal limits  CRP <0.1  HLA-B27 was negative  Lyme titer was negative  Rheumatoid factor was negative  Sed rate was noted to be 44 (slightly elevated)  T4 within normal range  TSH within normal range  Uric acid within normal range     ASSESSMENT:     Diagnosis Orders   1. Arthritis of both knees     2. Left hand pain          Discussion:  Had a discussion with the patient regarding her RA panel findings, which were found to be within normal limits. Her swelling appears to be improved on exam today although she does not feel this way.   Discussed we can consider formal therapy to

## 2019-09-09 ENCOUNTER — OFFICE VISIT (OUTPATIENT)
Dept: FAMILY MEDICINE CLINIC | Age: 69
End: 2019-09-09
Payer: MEDICARE

## 2019-09-09 VITALS
BODY MASS INDEX: 34.68 KG/M2 | HEIGHT: 59 IN | HEART RATE: 78 BPM | TEMPERATURE: 98.2 F | WEIGHT: 172 LBS | SYSTOLIC BLOOD PRESSURE: 118 MMHG | OXYGEN SATURATION: 95 % | DIASTOLIC BLOOD PRESSURE: 72 MMHG

## 2019-09-09 DIAGNOSIS — M65.311 TRIGGER THUMB, RIGHT THUMB: ICD-10-CM

## 2019-09-09 DIAGNOSIS — F32.1 MAJOR DEPRESSIVE DISORDER, SINGLE EPISODE, MODERATE (HCC): ICD-10-CM

## 2019-09-09 DIAGNOSIS — M15.9 OSTEOARTHRITIS OF MULTIPLE JOINTS, UNSPECIFIED OSTEOARTHRITIS TYPE: ICD-10-CM

## 2019-09-09 DIAGNOSIS — M17.0 ARTHRITIS OF BOTH KNEES: ICD-10-CM

## 2019-09-09 DIAGNOSIS — M25.50 POLYARTHRALGIA: ICD-10-CM

## 2019-09-09 DIAGNOSIS — M65.311 TRIGGER THUMB OF RIGHT HAND: Primary | ICD-10-CM

## 2019-09-09 DIAGNOSIS — M79.89 SWELLING OF LEFT HAND: ICD-10-CM

## 2019-09-09 DIAGNOSIS — R60.0 EDEMA OF HAND: ICD-10-CM

## 2019-09-09 DIAGNOSIS — E04.9 GOITER, NODULAR: Primary | ICD-10-CM

## 2019-09-09 DIAGNOSIS — Z86.39 HX OF THYROID NODULE: ICD-10-CM

## 2019-09-09 DIAGNOSIS — M79.642 LEFT HAND PAIN: ICD-10-CM

## 2019-09-09 PROBLEM — J06.9 VIRAL URI: Status: RESOLVED | Noted: 2019-05-15 | Resolved: 2019-09-09

## 2019-09-09 PROCEDURE — 99214 OFFICE O/P EST MOD 30 MIN: CPT | Performed by: NURSE PRACTITIONER

## 2019-09-09 PROCEDURE — 3288F FALL RISK ASSESSMENT DOCD: CPT | Performed by: NURSE PRACTITIONER

## 2019-09-09 RX ORDER — PREDNISONE 10 MG/1
10 TABLET ORAL SEE ADMIN INSTRUCTIONS
Qty: 21 TABLET | Refills: 0 | Status: SHIPPED | OUTPATIENT
Start: 2019-09-09 | End: 2019-09-15

## 2019-09-09 ASSESSMENT — ENCOUNTER SYMPTOMS
CONSTIPATION: 0
COLOR CHANGE: 0
SHORTNESS OF BREATH: 0
VOMITING: 0
SINUS PAIN: 0
EYE PAIN: 0
WHEEZING: 0
DIARRHEA: 0
COUGH: 0
NAUSEA: 0
CHEST TIGHTNESS: 0

## 2019-09-09 NOTE — PROGRESS NOTES
Duplicate chart, details of visit noted under 22:33 today. .. HPI:  Patient comes in today for   Chief Complaint   Patient presents with    Finger Pain     Thumb right hand painful and locking up in the joint area. Pain 8/10   . Plan:    Renita Riedel was seen today for finger pain. Diagnoses and all orders for this visit:    Edema of hand  -     predniSONE (DELTASONE) 10 MG tablet; Take 1 tablet by mouth See Admin Instructions for 6 days    Hx of thyroid nodule  -     US Head Neck Soft Tissue Thyroid; Future    Osteoarthritis of multiple joints, unspecified osteoarthritis type  -     diclofenac sodium 1 % GEL;  Apply 2 g topically 4 times daily    Greater than 25  Minutes was spent with patient and more than 50% of the time was spent face to facecounseling and educating regarding diagnoses

## 2019-09-10 NOTE — PROGRESS NOTES
touches. Lymphadenopathy:     She has no cervical adenopathy. Neurological: She is alert and oriented to person, place, and time. No cranial nerve deficit. She exhibits normal muscle tone. Coordination normal.   Skin: Skin is warm and dry. Capillary refill takes less than 2 seconds. No rash noted. No erythema. Psychiatric: She has a normal mood and affect. Her behavior is normal. Judgment and thought content normal.   Nursing note and vitals reviewed. Health Maintenance    BP Readings from Last 1 Encounters:   09/09/19 118/72    Recheck if >140/90  Hemoglobin A1C (%)   Date Value   06/05/2019 6.0         Plan:    Diagnoses and all orders for this visit:    Goiter, nodular  -     [address] [place] [address]., DO, Ear, Nose, Throat, Yovannyben Tijerina has had an US of her thyroid in the past & it was recommended that she have another one in 6 months, she will obtain it today for us & we will place a referral to ENT for evaluation    Jean Carlos Benavides does not believe that her edema is related to her arthritis & would like to continue to explore reasons for the hand edema    Arthritis of both knees  - Huitronkelly Qureshi has arthritis in both of her knees & hands, she would like to have the cyst on her left knee removed or drained, she will speak with Dr. Angela Meyers about it at her next appointment.     Left hand / Swelling of left hand /   Trigger thumb, right thumb  -     Marion Boone MD, Orthopaedics (hand & upper extremities), Melanie Alva will follow up with Dr. Angela Meyers regarding her right trigger thumb pain, left hand swelling & edema     Greater than 25  Minutes was spent with patient and more than 50% of the time was spent face to facecounseling and educating regarding diagnoses

## 2019-09-16 ENCOUNTER — TELEPHONE (OUTPATIENT)
Dept: FAMILY MEDICINE CLINIC | Age: 69
End: 2019-09-16

## 2019-10-25 ENCOUNTER — TELEPHONE (OUTPATIENT)
Dept: ORTHOPEDIC SURGERY | Age: 69
End: 2019-10-25

## 2019-10-25 DIAGNOSIS — R52 PAIN: Primary | ICD-10-CM

## 2019-10-29 ENCOUNTER — OFFICE VISIT (OUTPATIENT)
Dept: ORTHOPEDIC SURGERY | Age: 69
End: 2019-10-29
Payer: MEDICARE

## 2019-10-29 VITALS — SYSTOLIC BLOOD PRESSURE: 138 MMHG | DIASTOLIC BLOOD PRESSURE: 79 MMHG | RESPIRATION RATE: 16 BRPM | HEART RATE: 71 BPM

## 2019-10-29 DIAGNOSIS — G56.01 RIGHT CARPAL TUNNEL SYNDROME: ICD-10-CM

## 2019-10-29 DIAGNOSIS — M65.311 TRIGGER FINGER OF RIGHT THUMB: Primary | ICD-10-CM

## 2019-10-29 PROCEDURE — 99204 OFFICE O/P NEW MOD 45 MIN: CPT | Performed by: ORTHOPAEDIC SURGERY

## 2019-10-29 PROCEDURE — G8598 ASA/ANTIPLAT THER USED: HCPCS | Performed by: ORTHOPAEDIC SURGERY

## 2019-10-29 PROCEDURE — G8484 FLU IMMUNIZE NO ADMIN: HCPCS | Performed by: ORTHOPAEDIC SURGERY

## 2019-10-29 PROCEDURE — 4040F PNEUMOC VAC/ADMIN/RCVD: CPT | Performed by: ORTHOPAEDIC SURGERY

## 2019-10-29 PROCEDURE — 3017F COLORECTAL CA SCREEN DOC REV: CPT | Performed by: ORTHOPAEDIC SURGERY

## 2019-10-29 PROCEDURE — 1123F ACP DISCUSS/DSCN MKR DOCD: CPT | Performed by: ORTHOPAEDIC SURGERY

## 2019-10-29 PROCEDURE — G8400 PT W/DXA NO RESULTS DOC: HCPCS | Performed by: ORTHOPAEDIC SURGERY

## 2019-10-29 PROCEDURE — 1036F TOBACCO NON-USER: CPT | Performed by: ORTHOPAEDIC SURGERY

## 2019-10-29 PROCEDURE — G8417 CALC BMI ABV UP PARAM F/U: HCPCS | Performed by: ORTHOPAEDIC SURGERY

## 2019-10-29 PROCEDURE — G8427 DOCREV CUR MEDS BY ELIG CLIN: HCPCS | Performed by: ORTHOPAEDIC SURGERY

## 2019-10-29 PROCEDURE — 1090F PRES/ABSN URINE INCON ASSESS: CPT | Performed by: ORTHOPAEDIC SURGERY

## 2019-11-01 ENCOUNTER — OFFICE VISIT (OUTPATIENT)
Dept: ENT CLINIC | Age: 69
End: 2019-11-01
Payer: MEDICARE

## 2019-11-01 VITALS
HEIGHT: 59 IN | DIASTOLIC BLOOD PRESSURE: 72 MMHG | WEIGHT: 171 LBS | SYSTOLIC BLOOD PRESSURE: 122 MMHG | HEART RATE: 75 BPM | BODY MASS INDEX: 34.47 KG/M2

## 2019-11-01 DIAGNOSIS — E04.1 THYROID NODULE: Primary | ICD-10-CM

## 2019-11-01 PROCEDURE — G8598 ASA/ANTIPLAT THER USED: HCPCS | Performed by: OTOLARYNGOLOGY

## 2019-11-01 PROCEDURE — 4040F PNEUMOC VAC/ADMIN/RCVD: CPT | Performed by: OTOLARYNGOLOGY

## 2019-11-01 PROCEDURE — 1090F PRES/ABSN URINE INCON ASSESS: CPT | Performed by: OTOLARYNGOLOGY

## 2019-11-01 PROCEDURE — 3017F COLORECTAL CA SCREEN DOC REV: CPT | Performed by: OTOLARYNGOLOGY

## 2019-11-01 PROCEDURE — 99203 OFFICE O/P NEW LOW 30 MIN: CPT | Performed by: OTOLARYNGOLOGY

## 2019-11-01 PROCEDURE — 1123F ACP DISCUSS/DSCN MKR DOCD: CPT | Performed by: OTOLARYNGOLOGY

## 2019-11-01 PROCEDURE — G8417 CALC BMI ABV UP PARAM F/U: HCPCS | Performed by: OTOLARYNGOLOGY

## 2019-11-01 PROCEDURE — 1036F TOBACCO NON-USER: CPT | Performed by: OTOLARYNGOLOGY

## 2019-11-01 PROCEDURE — G8482 FLU IMMUNIZE ORDER/ADMIN: HCPCS | Performed by: OTOLARYNGOLOGY

## 2019-11-01 PROCEDURE — G8427 DOCREV CUR MEDS BY ELIG CLIN: HCPCS | Performed by: OTOLARYNGOLOGY

## 2019-11-01 PROCEDURE — G8400 PT W/DXA NO RESULTS DOC: HCPCS | Performed by: OTOLARYNGOLOGY

## 2019-11-05 ENCOUNTER — OFFICE VISIT (OUTPATIENT)
Dept: FAMILY MEDICINE CLINIC | Age: 69
End: 2019-11-05
Payer: MEDICARE

## 2019-11-05 VITALS
TEMPERATURE: 98.1 F | DIASTOLIC BLOOD PRESSURE: 62 MMHG | HEART RATE: 63 BPM | SYSTOLIC BLOOD PRESSURE: 110 MMHG | OXYGEN SATURATION: 97 % | BODY MASS INDEX: 34.68 KG/M2 | HEIGHT: 59 IN | WEIGHT: 172 LBS

## 2019-11-05 DIAGNOSIS — R05.9 COUGH: ICD-10-CM

## 2019-11-05 DIAGNOSIS — Z01.818 PRE-OP EXAM: Primary | ICD-10-CM

## 2019-11-05 DIAGNOSIS — Z23 FLU VACCINE NEED: ICD-10-CM

## 2019-11-05 PROCEDURE — G8400 PT W/DXA NO RESULTS DOC: HCPCS | Performed by: NURSE PRACTITIONER

## 2019-11-05 PROCEDURE — 99214 OFFICE O/P EST MOD 30 MIN: CPT | Performed by: NURSE PRACTITIONER

## 2019-11-05 PROCEDURE — 93000 ELECTROCARDIOGRAM COMPLETE: CPT | Performed by: NURSE PRACTITIONER

## 2019-11-05 PROCEDURE — 90653 IIV ADJUVANT VACCINE IM: CPT | Performed by: NURSE PRACTITIONER

## 2019-11-05 PROCEDURE — G8427 DOCREV CUR MEDS BY ELIG CLIN: HCPCS | Performed by: NURSE PRACTITIONER

## 2019-11-05 PROCEDURE — G8598 ASA/ANTIPLAT THER USED: HCPCS | Performed by: NURSE PRACTITIONER

## 2019-11-05 PROCEDURE — 3017F COLORECTAL CA SCREEN DOC REV: CPT | Performed by: NURSE PRACTITIONER

## 2019-11-05 PROCEDURE — 1123F ACP DISCUSS/DSCN MKR DOCD: CPT | Performed by: NURSE PRACTITIONER

## 2019-11-05 PROCEDURE — G0008 ADMIN INFLUENZA VIRUS VAC: HCPCS | Performed by: NURSE PRACTITIONER

## 2019-11-05 PROCEDURE — 1090F PRES/ABSN URINE INCON ASSESS: CPT | Performed by: NURSE PRACTITIONER

## 2019-11-05 PROCEDURE — 1036F TOBACCO NON-USER: CPT | Performed by: NURSE PRACTITIONER

## 2019-11-05 PROCEDURE — 4040F PNEUMOC VAC/ADMIN/RCVD: CPT | Performed by: NURSE PRACTITIONER

## 2019-11-05 PROCEDURE — G8482 FLU IMMUNIZE ORDER/ADMIN: HCPCS | Performed by: NURSE PRACTITIONER

## 2019-11-05 PROCEDURE — G8417 CALC BMI ABV UP PARAM F/U: HCPCS | Performed by: NURSE PRACTITIONER

## 2019-11-05 RX ORDER — BENZONATATE 100 MG/1
100 CAPSULE ORAL 3 TIMES DAILY PRN
Qty: 30 CAPSULE | Refills: 0 | Status: SHIPPED | OUTPATIENT
Start: 2019-11-05 | End: 2019-11-12

## 2019-11-05 ASSESSMENT — ENCOUNTER SYMPTOMS
COUGH: 1
VOMITING: 0
COLOR CHANGE: 0
CONSTIPATION: 0
WHEEZING: 0
ABDOMINAL PAIN: 0
SHORTNESS OF BREATH: 0
SINUS PAIN: 0
CHEST TIGHTNESS: 0
DIARRHEA: 0
EYE PAIN: 0
NAUSEA: 0

## 2019-11-11 ASSESSMENT — ENCOUNTER SYMPTOMS
VOMITING: 0
VOICE CHANGE: 0
SINUS PRESSURE: 0
RHINORRHEA: 0
SHORTNESS OF BREATH: 0
COUGH: 0

## 2019-11-27 DIAGNOSIS — I10 ESSENTIAL HYPERTENSION: ICD-10-CM

## 2019-11-27 DIAGNOSIS — E11.9 TYPE 2 DIABETES MELLITUS WITHOUT COMPLICATION, WITHOUT LONG-TERM CURRENT USE OF INSULIN (HCC): ICD-10-CM

## 2019-11-28 RX ORDER — ATENOLOL 25 MG/1
50 TABLET ORAL DAILY
Qty: 180 TABLET | Refills: 2 | Status: SHIPPED
Start: 2019-11-28 | End: 2020-02-06

## 2019-11-28 RX ORDER — SIMVASTATIN 80 MG
80 TABLET ORAL NIGHTLY
Qty: 90 TABLET | Refills: 2 | Status: SHIPPED | OUTPATIENT
Start: 2019-11-28 | End: 2019-12-16 | Stop reason: SDUPTHER

## 2019-12-03 ENCOUNTER — PREP FOR PROCEDURE (OUTPATIENT)
Dept: ORTHOPEDIC SURGERY | Age: 69
End: 2019-12-03

## 2019-12-03 RX ORDER — ATENOLOL 25 MG/1
TABLET ORAL
Qty: 180 TABLET | Refills: 0 | Status: SHIPPED | OUTPATIENT
Start: 2019-12-03 | End: 2019-12-10 | Stop reason: ALTCHOICE

## 2019-12-03 RX ORDER — SODIUM CHLORIDE 0.9 % (FLUSH) 0.9 %
10 SYRINGE (ML) INJECTION EVERY 12 HOURS SCHEDULED
Status: CANCELLED | OUTPATIENT
Start: 2019-12-03

## 2019-12-03 RX ORDER — SODIUM CHLORIDE 9 MG/ML
INJECTION, SOLUTION INTRAVENOUS CONTINUOUS
Status: CANCELLED | OUTPATIENT
Start: 2019-12-03

## 2019-12-03 RX ORDER — SODIUM CHLORIDE 0.9 % (FLUSH) 0.9 %
10 SYRINGE (ML) INJECTION PRN
Status: CANCELLED | OUTPATIENT
Start: 2019-12-03

## 2019-12-03 RX ORDER — SIMVASTATIN 80 MG
TABLET ORAL
Qty: 90 TABLET | Refills: 0 | Status: SHIPPED | OUTPATIENT
Start: 2019-12-03 | End: 2019-12-10 | Stop reason: ALTCHOICE

## 2019-12-10 ENCOUNTER — OFFICE VISIT (OUTPATIENT)
Dept: CARDIOLOGY CLINIC | Age: 69
End: 2019-12-10
Payer: MEDICARE

## 2019-12-10 VITALS
SYSTOLIC BLOOD PRESSURE: 118 MMHG | HEART RATE: 66 BPM | BODY MASS INDEX: 34.35 KG/M2 | RESPIRATION RATE: 18 BRPM | HEIGHT: 59 IN | WEIGHT: 170.4 LBS | DIASTOLIC BLOOD PRESSURE: 72 MMHG

## 2019-12-10 DIAGNOSIS — I65.21 STENOSIS OF RIGHT CAROTID ARTERY: Primary | ICD-10-CM

## 2019-12-10 PROCEDURE — 99214 OFFICE O/P EST MOD 30 MIN: CPT | Performed by: INTERNAL MEDICINE

## 2019-12-10 PROCEDURE — 1036F TOBACCO NON-USER: CPT | Performed by: INTERNAL MEDICINE

## 2019-12-10 PROCEDURE — 93000 ELECTROCARDIOGRAM COMPLETE: CPT | Performed by: INTERNAL MEDICINE

## 2019-12-10 PROCEDURE — G8482 FLU IMMUNIZE ORDER/ADMIN: HCPCS | Performed by: INTERNAL MEDICINE

## 2019-12-10 PROCEDURE — 4040F PNEUMOC VAC/ADMIN/RCVD: CPT | Performed by: INTERNAL MEDICINE

## 2019-12-10 PROCEDURE — 1123F ACP DISCUSS/DSCN MKR DOCD: CPT | Performed by: INTERNAL MEDICINE

## 2019-12-10 PROCEDURE — G8417 CALC BMI ABV UP PARAM F/U: HCPCS | Performed by: INTERNAL MEDICINE

## 2019-12-10 PROCEDURE — G8427 DOCREV CUR MEDS BY ELIG CLIN: HCPCS | Performed by: INTERNAL MEDICINE

## 2019-12-10 PROCEDURE — 3017F COLORECTAL CA SCREEN DOC REV: CPT | Performed by: INTERNAL MEDICINE

## 2019-12-10 PROCEDURE — G8400 PT W/DXA NO RESULTS DOC: HCPCS | Performed by: INTERNAL MEDICINE

## 2019-12-10 PROCEDURE — G8598 ASA/ANTIPLAT THER USED: HCPCS | Performed by: INTERNAL MEDICINE

## 2019-12-10 PROCEDURE — 1090F PRES/ABSN URINE INCON ASSESS: CPT | Performed by: INTERNAL MEDICINE

## 2019-12-13 ENCOUNTER — HOSPITAL ENCOUNTER (OUTPATIENT)
Age: 69
Setting detail: OUTPATIENT SURGERY
Discharge: HOME | End: 2019-12-13
Attending: ORTHOPAEDIC SURGERY | Admitting: ORTHOPAEDIC SURGERY
Payer: MEDICARE

## 2019-12-13 ENCOUNTER — ANESTHESIA (OUTPATIENT)
Dept: OPERATING ROOM | Age: 69
End: 2019-12-13
Payer: MEDICARE

## 2019-12-13 ENCOUNTER — ANESTHESIA EVENT (OUTPATIENT)
Dept: OPERATING ROOM | Age: 69
End: 2019-12-13
Payer: MEDICARE

## 2019-12-13 VITALS
BODY MASS INDEX: 34.27 KG/M2 | WEIGHT: 170 LBS | OXYGEN SATURATION: 94 % | DIASTOLIC BLOOD PRESSURE: 58 MMHG | HEART RATE: 67 BPM | TEMPERATURE: 98.6 F | RESPIRATION RATE: 16 BRPM | HEIGHT: 59 IN | SYSTOLIC BLOOD PRESSURE: 120 MMHG

## 2019-12-13 VITALS — OXYGEN SATURATION: 94 % | DIASTOLIC BLOOD PRESSURE: 70 MMHG | SYSTOLIC BLOOD PRESSURE: 111 MMHG

## 2019-12-13 DIAGNOSIS — G89.18 POST-OPERATIVE PAIN: Primary | ICD-10-CM

## 2019-12-13 LAB — METER GLUCOSE: 103 MG/DL (ref 74–99)

## 2019-12-13 PROCEDURE — 2709999900 HC NON-CHARGEABLE SUPPLY: Performed by: ORTHOPAEDIC SURGERY

## 2019-12-13 PROCEDURE — 2720000010 HC SURG SUPPLY STERILE: Performed by: ORTHOPAEDIC SURGERY

## 2019-12-13 PROCEDURE — 2500000003 HC RX 250 WO HCPCS: Performed by: ORTHOPAEDIC SURGERY

## 2019-12-13 PROCEDURE — 3700000000 HC ANESTHESIA ATTENDED CARE: Performed by: ORTHOPAEDIC SURGERY

## 2019-12-13 PROCEDURE — 82962 GLUCOSE BLOOD TEST: CPT

## 2019-12-13 PROCEDURE — 7100000011 HC PHASE II RECOVERY - ADDTL 15 MIN: Performed by: ORTHOPAEDIC SURGERY

## 2019-12-13 PROCEDURE — 3700000001 HC ADD 15 MINUTES (ANESTHESIA): Performed by: ORTHOPAEDIC SURGERY

## 2019-12-13 PROCEDURE — 29848 WRIST ENDOSCOPY/SURGERY: CPT | Performed by: ORTHOPAEDIC SURGERY

## 2019-12-13 PROCEDURE — 6360000002 HC RX W HCPCS: Performed by: PHYSICIAN ASSISTANT

## 2019-12-13 PROCEDURE — 3600000013 HC SURGERY LEVEL 3 ADDTL 15MIN: Performed by: ORTHOPAEDIC SURGERY

## 2019-12-13 PROCEDURE — 26055 INCISE FINGER TENDON SHEATH: CPT | Performed by: ORTHOPAEDIC SURGERY

## 2019-12-13 PROCEDURE — 6360000002 HC RX W HCPCS

## 2019-12-13 PROCEDURE — 2580000003 HC RX 258

## 2019-12-13 PROCEDURE — 7100000010 HC PHASE II RECOVERY - FIRST 15 MIN: Performed by: ORTHOPAEDIC SURGERY

## 2019-12-13 PROCEDURE — 3600000003 HC SURGERY LEVEL 3 BASE: Performed by: ORTHOPAEDIC SURGERY

## 2019-12-13 RX ORDER — LIDOCAINE HYDROCHLORIDE AND EPINEPHRINE 10; 10 MG/ML; UG/ML
INJECTION, SOLUTION INFILTRATION; PERINEURAL PRN
Status: DISCONTINUED | OUTPATIENT
Start: 2019-12-13 | End: 2019-12-13 | Stop reason: ALTCHOICE

## 2019-12-13 RX ORDER — SODIUM CHLORIDE 9 MG/ML
INJECTION, SOLUTION INTRAVENOUS CONTINUOUS
Status: DISCONTINUED | OUTPATIENT
Start: 2019-12-13 | End: 2019-12-13 | Stop reason: HOSPADM

## 2019-12-13 RX ORDER — CEFAZOLIN SODIUM 2 G/50ML
2 SOLUTION INTRAVENOUS
Status: COMPLETED | OUTPATIENT
Start: 2019-12-13 | End: 2019-12-13

## 2019-12-13 RX ORDER — PROPOFOL 10 MG/ML
INJECTION, EMULSION INTRAVENOUS CONTINUOUS PRN
Status: DISCONTINUED | OUTPATIENT
Start: 2019-12-13 | End: 2019-12-13 | Stop reason: SDUPTHER

## 2019-12-13 RX ORDER — FENTANYL CITRATE 50 UG/ML
INJECTION, SOLUTION INTRAMUSCULAR; INTRAVENOUS PRN
Status: DISCONTINUED | OUTPATIENT
Start: 2019-12-13 | End: 2019-12-13 | Stop reason: SDUPTHER

## 2019-12-13 RX ORDER — SODIUM CHLORIDE 0.9 % (FLUSH) 0.9 %
10 SYRINGE (ML) INJECTION EVERY 12 HOURS SCHEDULED
Status: DISCONTINUED | OUTPATIENT
Start: 2019-12-13 | End: 2019-12-13 | Stop reason: HOSPADM

## 2019-12-13 RX ORDER — SODIUM CHLORIDE 0.9 % (FLUSH) 0.9 %
10 SYRINGE (ML) INJECTION PRN
Status: DISCONTINUED | OUTPATIENT
Start: 2019-12-13 | End: 2019-12-13 | Stop reason: HOSPADM

## 2019-12-13 RX ORDER — HYDROCODONE BITARTRATE AND ACETAMINOPHEN 5; 325 MG/1; MG/1
1 TABLET ORAL EVERY 6 HOURS PRN
Qty: 15 TABLET | Refills: 0 | Status: SHIPPED | OUTPATIENT
Start: 2019-12-13 | End: 2019-12-16

## 2019-12-13 RX ORDER — PROPOFOL 10 MG/ML
INJECTION, EMULSION INTRAVENOUS PRN
Status: DISCONTINUED | OUTPATIENT
Start: 2019-12-13 | End: 2019-12-13 | Stop reason: SDUPTHER

## 2019-12-13 RX ORDER — SODIUM CHLORIDE 9 MG/ML
INJECTION, SOLUTION INTRAVENOUS CONTINUOUS PRN
Status: DISCONTINUED | OUTPATIENT
Start: 2019-12-13 | End: 2019-12-13 | Stop reason: SDUPTHER

## 2019-12-13 RX ADMIN — FENTANYL CITRATE 50 MCG: 50 INJECTION, SOLUTION INTRAMUSCULAR; INTRAVENOUS at 11:18

## 2019-12-13 RX ADMIN — PROPOFOL 100 MCG/KG/MIN: 10 INJECTION, EMULSION INTRAVENOUS at 11:18

## 2019-12-13 RX ADMIN — CEFAZOLIN SODIUM 2 G: 2 SOLUTION INTRAVENOUS at 11:15

## 2019-12-13 RX ADMIN — FENTANYL CITRATE 50 MCG: 50 INJECTION, SOLUTION INTRAMUSCULAR; INTRAVENOUS at 11:25

## 2019-12-13 RX ADMIN — PROPOFOL 50 MG: 10 INJECTION, EMULSION INTRAVENOUS at 11:18

## 2019-12-13 RX ADMIN — SODIUM CHLORIDE: 9 INJECTION, SOLUTION INTRAVENOUS at 11:15

## 2019-12-13 ASSESSMENT — PULMONARY FUNCTION TESTS
PIF_VALUE: 0
PIF_VALUE: 1
PIF_VALUE: 0

## 2019-12-13 ASSESSMENT — PAIN - FUNCTIONAL ASSESSMENT: PAIN_FUNCTIONAL_ASSESSMENT: 0-10

## 2019-12-13 ASSESSMENT — LIFESTYLE VARIABLES: SMOKING_STATUS: 0

## 2019-12-16 ENCOUNTER — OFFICE VISIT (OUTPATIENT)
Dept: FAMILY MEDICINE CLINIC | Age: 69
End: 2019-12-16
Payer: MEDICARE

## 2019-12-16 VITALS
BODY MASS INDEX: 34.47 KG/M2 | WEIGHT: 171 LBS | TEMPERATURE: 98 F | SYSTOLIC BLOOD PRESSURE: 116 MMHG | HEIGHT: 59 IN | OXYGEN SATURATION: 96 % | HEART RATE: 80 BPM | DIASTOLIC BLOOD PRESSURE: 64 MMHG

## 2019-12-16 DIAGNOSIS — Z00.00 ROUTINE GENERAL MEDICAL EXAMINATION AT A HEALTH CARE FACILITY: Primary | ICD-10-CM

## 2019-12-16 DIAGNOSIS — I10 ESSENTIAL HYPERTENSION: ICD-10-CM

## 2019-12-16 DIAGNOSIS — F32.A ANXIETY AND DEPRESSION: ICD-10-CM

## 2019-12-16 DIAGNOSIS — E11.9 TYPE 2 DIABETES MELLITUS WITHOUT COMPLICATION, WITHOUT LONG-TERM CURRENT USE OF INSULIN (HCC): ICD-10-CM

## 2019-12-16 DIAGNOSIS — F41.9 ANXIETY AND DEPRESSION: ICD-10-CM

## 2019-12-16 PROCEDURE — 3017F COLORECTAL CA SCREEN DOC REV: CPT | Performed by: NURSE PRACTITIONER

## 2019-12-16 PROCEDURE — G0438 PPPS, INITIAL VISIT: HCPCS | Performed by: NURSE PRACTITIONER

## 2019-12-16 PROCEDURE — G8482 FLU IMMUNIZE ORDER/ADMIN: HCPCS | Performed by: NURSE PRACTITIONER

## 2019-12-16 PROCEDURE — 4040F PNEUMOC VAC/ADMIN/RCVD: CPT | Performed by: NURSE PRACTITIONER

## 2019-12-16 PROCEDURE — 1123F ACP DISCUSS/DSCN MKR DOCD: CPT | Performed by: NURSE PRACTITIONER

## 2019-12-16 PROCEDURE — 3044F HG A1C LEVEL LT 7.0%: CPT | Performed by: NURSE PRACTITIONER

## 2019-12-16 PROCEDURE — G8598 ASA/ANTIPLAT THER USED: HCPCS | Performed by: NURSE PRACTITIONER

## 2019-12-16 RX ORDER — VENLAFAXINE HYDROCHLORIDE 150 MG/1
CAPSULE, EXTENDED RELEASE ORAL
Qty: 90 CAPSULE | Refills: 3 | Status: SHIPPED
Start: 2019-12-16 | End: 2020-06-12 | Stop reason: SDUPTHER

## 2019-12-16 RX ORDER — ISOPROPYL ALCOHOL 0.75 G/1
SWAB TOPICAL
Qty: 100 EACH | Refills: 5 | Status: SHIPPED
Start: 2019-12-16 | End: 2020-06-12 | Stop reason: SDUPTHER

## 2019-12-16 RX ORDER — PEN NEEDLE, DIABETIC 31 G X1/4"
1 NEEDLE, DISPOSABLE MISCELLANEOUS DAILY
Qty: 100 EACH | Refills: 5 | Status: SHIPPED
Start: 2019-12-16 | End: 2020-05-14

## 2019-12-16 RX ORDER — SIMVASTATIN 80 MG
80 TABLET ORAL NIGHTLY
Qty: 90 TABLET | Refills: 2 | Status: SHIPPED
Start: 2019-12-16 | End: 2020-06-12 | Stop reason: SDUPTHER

## 2019-12-16 RX ORDER — ACETAMINOPHEN 500 MG
500 TABLET ORAL 4 TIMES DAILY PRN
Qty: 360 TABLET | Refills: 1 | Status: SHIPPED
Start: 2019-12-16 | End: 2020-06-12 | Stop reason: SDUPTHER

## 2019-12-16 RX ORDER — QUINAPRIL 5 1/1
5 TABLET ORAL NIGHTLY
Qty: 90 TABLET | Refills: 2 | Status: SHIPPED
Start: 2019-12-16 | End: 2020-06-12 | Stop reason: SDUPTHER

## 2019-12-16 RX ORDER — FAMOTIDINE 20 MG/1
20 TABLET, FILM COATED ORAL 2 TIMES DAILY
Qty: 180 TABLET | Refills: 2 | Status: SHIPPED
Start: 2019-12-16 | End: 2020-06-12 | Stop reason: SDUPTHER

## 2019-12-16 RX ORDER — ASPIRIN 81 MG/1
81 TABLET ORAL DAILY
Qty: 90 TABLET | Refills: 1 | Status: SHIPPED | OUTPATIENT
Start: 2019-12-16

## 2019-12-16 RX ORDER — LANCETS 30 GAUGE
EACH MISCELLANEOUS
Qty: 200 EACH | Refills: 2 | Status: SHIPPED
Start: 2019-12-16 | End: 2020-06-12 | Stop reason: SDUPTHER

## 2019-12-16 RX ORDER — GLUCOSAMINE HCL/CHONDROITIN SU 500-400 MG
CAPSULE ORAL
Qty: 200 STRIP | Refills: 5 | Status: SHIPPED
Start: 2019-12-16 | End: 2020-06-12 | Stop reason: SDUPTHER

## 2019-12-16 ASSESSMENT — ENCOUNTER SYMPTOMS
CONSTIPATION: 0
SHORTNESS OF BREATH: 0
EYE PAIN: 0
COLOR CHANGE: 0
VOMITING: 0
CHEST TIGHTNESS: 0
NAUSEA: 0
SINUS PAIN: 0
WHEEZING: 0
SORE THROAT: 0
DIARRHEA: 0
COUGH: 1

## 2019-12-16 ASSESSMENT — PATIENT HEALTH QUESTIONNAIRE - PHQ9
SUM OF ALL RESPONSES TO PHQ QUESTIONS 1-9: 1
SUM OF ALL RESPONSES TO PHQ QUESTIONS 1-9: 1

## 2019-12-16 ASSESSMENT — LIFESTYLE VARIABLES: HOW OFTEN DO YOU HAVE A DRINK CONTAINING ALCOHOL: 0

## 2019-12-26 ENCOUNTER — OFFICE VISIT (OUTPATIENT)
Dept: ORTHOPEDIC SURGERY | Age: 69
End: 2019-12-26

## 2019-12-26 VITALS — DIASTOLIC BLOOD PRESSURE: 80 MMHG | RESPIRATION RATE: 16 BRPM | SYSTOLIC BLOOD PRESSURE: 130 MMHG | HEART RATE: 78 BPM

## 2019-12-26 DIAGNOSIS — M65.311 TRIGGER FINGER OF RIGHT THUMB: ICD-10-CM

## 2019-12-26 DIAGNOSIS — G56.01 RIGHT CARPAL TUNNEL SYNDROME: Primary | ICD-10-CM

## 2019-12-26 PROCEDURE — 99024 POSTOP FOLLOW-UP VISIT: CPT | Performed by: ORTHOPAEDIC SURGERY

## 2020-01-08 NOTE — PROGRESS NOTES
[address] PRE-ADMISSION TESTING GENERAL INSTRUCTIONS- [facility]-phone number:[phone]    GENERAL INSTRUCTIONS  [x] Antibacterial Soap shower Night before and/or AM of Surgery  [x] Nothing by mouth after midnight, including gum, candy, mints, or water. [x] You may brush your teeth, gargle, but do NOT swallow water. [x] Jewelry, valuables or body piercing's should not be brought to the hospital. All body and/or tongue piercing's must be removed prior to arriving to hospital.  ALL hair pins must be removed. [x] Do not wear makeup, lotions, powders, deodorant. Nail polish as directed by the nurse. [] Bring insurance card and photo ID.  [] Transfusion Bracelet: Please bring with you to hospital, day of surgery     PARKING INSTRUCTIONS:   [x] Arrival Time:_0530  · [x] Parking lot '\"I\"  is located on [facility] (the corner of [facility]). To enter, press the button and the gate will lift. A free token will be provided to exit the lot. One car per patient is allowed to park in this lot. All other cars are to park on [address] [place] [address] either in the parking garage or the handicap lot. [] To reach the [facility] from [address] [place] [address], upon entering the hospital, take elevator B to the 3rd floor. EDUCATION INSTRUCTIONS:      [x] Pre-admission Testing educational folder given  [x] Incentive Spirometry,coughing & deep breathing exercises reviewed. [x]Medication information sheet(s)   [x]Fluoroscopy-Xray used in surgery reviewed with patient. Educational pamphlet placed in chart. [x]Pain: Post-op pain is normal and to be expected. You will be asked to rate your pain from 0-10(a zero is not acceptable-education is needed). Your post-op pain goal is:5  [x] Ask your nurse for your pain medication. [x] Other:__MAY HAVE SORE THROAT AND DIFFICULTY SWALLOWING AFTER SURGERY.      MEDICATION INSTRUCTIONS:   [x]Bring a complete list of your medications, please write the last time you took the medicine, give this list to the nurse. [x] Take the following medications the morning of surgery with 1-2 ounces of water: ATENOLOL,  FAMOTIDINE, VENLAFAXINE  [x] Stop herbal supplements and vitamins 5 days before your surgery. [x] DO NOT take any diabetic medicine the morning of surgery. Follow instructions for insulin the day before surgery. [x] If you are diabetic and your blood sugar is low or you feel symptomatic, you may drink 1-2 ounces of apple juice or take a glucose tablet. The morning of your procedure, you may call the pre-op area if you have concerns about your blood sugar [phone]. [x] Follow physician instructions regarding any blood thinners you may be taking. WHAT TO EXPECT:  [x] The day of surgery you will be greeted and checked in by the [facility] [employer].  In addition, you will be registered in the [facility] by a Patient Access Representative. Please bring your photo ID and insurance card. A nurse will greet you in accordance to the time you are needed in the pre-op area to prepare you for surgery. Please do not be discouraged if you are not greeted in the order you arrive as there are many variables that are involved in patient preparation. Your patience is greatly appreciated as you wait for your nurse. Please bring in items such as: books, magazines, newspapers, electronics, or any other items  to occupy your time in the waiting area. [x]  Delays may occur with surgery and staff will make a sincere effort to keep you informed of delays. If any delays occur with your procedure, we apologize ahead of time for your inconvenience as we recognize the value of your time.

## 2020-01-10 ENCOUNTER — ANESTHESIA EVENT (OUTPATIENT)
Dept: OPERATING ROOM | Age: 70
End: 2020-01-10
Payer: MEDICARE

## 2020-01-10 ENCOUNTER — HOSPITAL ENCOUNTER (OUTPATIENT)
Dept: PREADMISSION TESTING | Age: 70
Discharge: HOME | End: 2020-01-10
Payer: MEDICARE

## 2020-01-10 VITALS
OXYGEN SATURATION: 95 % | TEMPERATURE: 98.2 F | DIASTOLIC BLOOD PRESSURE: 60 MMHG | RESPIRATION RATE: 18 BRPM | WEIGHT: 171 LBS | HEART RATE: 71 BPM | BODY MASS INDEX: 34.54 KG/M2 | SYSTOLIC BLOOD PRESSURE: 128 MMHG

## 2020-01-10 LAB
ANION GAP SERPL CALCULATED.3IONS-SCNC: 8 MMOL/L (ref 7–16)
BUN BLDV-MCNC: 19 MG/DL (ref 8–23)
CALCIUM SERPL-MCNC: 9.1 MG/DL (ref 8.6–10.2)
CHLORIDE BLD-SCNC: 102 MMOL/L (ref 98–107)
CO2: 26 MMOL/L (ref 22–29)
CREAT SERPL-MCNC: 0.7 MG/DL (ref 0.5–1)
GFR AFRICAN AMERICAN: >60
GFR NON-AFRICAN AMERICAN: >60 ML/MIN/1.73
GLUCOSE BLD-MCNC: 190 MG/DL (ref 74–99)
HCT VFR BLD CALC: 39.2 % (ref 34–48)
HEMOGLOBIN: 12.4 G/DL (ref 11.5–15.5)
MCH RBC QN AUTO: 29.5 PG (ref 26–35)
MCHC RBC AUTO-ENTMCNC: 31.6 % (ref 32–34.5)
MCV RBC AUTO: 93.1 FL (ref 80–99.9)
PDW BLD-RTO: 12.1 FL (ref 11.5–15)
PLATELET # BLD: 229 E9/L (ref 130–450)
PMV BLD AUTO: 9.4 FL (ref 7–12)
POTASSIUM SERPL-SCNC: 4.5 MMOL/L (ref 3.5–5)
RBC # BLD: 4.21 E12/L (ref 3.5–5.5)
SODIUM BLD-SCNC: 136 MMOL/L (ref 132–146)
WBC # BLD: 5.2 E9/L (ref 4.5–11.5)

## 2020-01-10 PROCEDURE — 80048 BASIC METABOLIC PNL TOTAL CA: CPT

## 2020-01-10 PROCEDURE — 85027 COMPLETE CBC AUTOMATED: CPT

## 2020-01-10 PROCEDURE — 36415 COLL VENOUS BLD VENIPUNCTURE: CPT

## 2020-01-10 ASSESSMENT — LIFESTYLE VARIABLES: SMOKING_STATUS: 0

## 2020-01-10 NOTE — ANESTHESIA PRE PROCEDURE
Department of Anesthesiology  Preprocedure Note       Name:  Damian Calhoun   Age:  71 y.o.  :  1950                                          MRN:  [id number]         Date:  1/10/2020      Surgeon: Christa Boast):  Miya Tineo DO    Procedure: TOTAL THYROIDECTOMY--NERVE INTEGRITY MONITOR (N/A )    Medications prior to admission:   Prior to Admission medications    Medication Sig Start Date End Date Taking? Authorizing Provider   simvastatin (ZOCOR) 80 MG tablet Take 1 tablet by mouth nightly 19   ROXY Ovalles CNP   quinapril (ACCUPRIL) 5 MG tablet Take 1 tablet by mouth nightly 19   ROXY Ovalles CNP   famotidine (PEPCID) 20 MG tablet Take 1 tablet by mouth 2 times daily 19   ROXY Ovalles CNP   acetaminophen (TYLENOL) 500 MG tablet Take 1 tablet by mouth 4 times daily as needed for Pain 19   ROXY Ovalles CNP   venlafaxine (EFFEXOR XR) 150 MG extended release capsule TAKE 1 CAPSULE EVERY DAY 19   ROXY Ovalles CNP   aspirin 81 MG EC tablet Take 1 tablet by mouth daily LD 19  Patient taking differently: Take 81 mg by mouth daily  19   ROXY Ovalles CNP   Liraglutide (VICTOZA) 18 MG/3ML SOPN SC injection INJECT 1.2 MG SUBCUTANEOUSLY DAILY 19   ROXY Ovalles CNP   Lancets MISC PT TESTS TWICE DAILY 19   ROXY Ovalles CNP   blood glucose monitor strips PT TESTS TWICE DAILY 19   ROXY Ovalles CNP   Insulin Pen Needle (PEN NEEDLES) 31G X 6 MM MISC 1 each by Does not apply route daily 19   ROXY Ovalles CNP   Alcohol Swabs (B-D SINGLE USE SWABS REGULAR) PADS Use 3 alcohol pads PRN when testing sugar.  19   ROXY Ovalles CNP   atenolol (TENORMIN) 25 MG tablet Take 2 tablets by mouth daily  Patient taking differently: Take 50 mg by mouth daily Instructed to take am of procedure 19   Jaron Nestle, APRN - CNP   diclofenac sodium 1 % GEL Apply 2 g topically 4 times daily 9/9/19   Mak RuboiROXY CNP   Blood Glucose Monitoring Suppl (ACCU-CHEK STEVEN PLUS) w/Device KIT CHECK BLOOD SUGAR TWICE DAILY 9/28/18   Krysta GibsonROXY CNP       Current medications:    No current facility-administered medications for this encounter. Current Outpatient Medications   Medication Sig Dispense Refill    simvastatin (ZOCOR) 80 MG tablet Take 1 tablet by mouth nightly 90 tablet 2    quinapril (ACCUPRIL) 5 MG tablet Take 1 tablet by mouth nightly 90 tablet 2    famotidine (PEPCID) 20 MG tablet Take 1 tablet by mouth 2 times daily 180 tablet 2    acetaminophen (TYLENOL) 500 MG tablet Take 1 tablet by mouth 4 times daily as needed for Pain 360 tablet 1    venlafaxine (EFFEXOR XR) 150 MG extended release capsule TAKE 1 CAPSULE EVERY DAY 90 capsule 3    aspirin 81 MG EC tablet Take 1 tablet by mouth daily LD 12-4-19 (Patient taking differently: Take 81 mg by mouth daily ) 90 tablet 1    Liraglutide (VICTOZA) 18 MG/3ML SOPN SC injection INJECT 1.2 MG SUBCUTANEOUSLY DAILY 18 mL 5    Lancets MISC PT TESTS TWICE DAILY 200 each 2    blood glucose monitor strips PT TESTS TWICE DAILY 200 strip 5    Insulin Pen Needle (PEN NEEDLES) 31G X 6 MM MISC 1 each by Does not apply route daily 100 each 5    Alcohol Swabs (B-D SINGLE USE SWABS REGULAR) PADS Use 3 alcohol pads PRN when testing sugar.  100 each 5    atenolol (TENORMIN) 25 MG tablet Take 2 tablets by mouth daily (Patient taking differently: Take 50 mg by mouth daily Instructed to take am of procedure) 180 tablet 2    diclofenac sodium 1 % GEL Apply 2 g topically 4 times daily 2 Tube 1    Blood Glucose Monitoring Suppl (ACCU-CHEK STEVEN PLUS) w/Device KIT CHECK BLOOD SUGAR TWICE DAILY 1 kit 0       Allergies:  No Known Allergies    Problem List:    Patient Active Problem List   Diagnosis Code    Hypertension I10    Hyperlipidemia E78.5    Carotid artery stenosis I65.29    Type 2 diabetes mellitus without complication (HCC) W79.0    Anxiety and depression F41.9, F32.9    Polyarthralgia M25.50    Arthritis of both knees M17.0    Swelling of left hand M79.89    Left hand pain M79.642       Past Medical History:        Diagnosis Date    Acute MI ([facility] [address] 08    CAD (coronary artery disease)     Dr. Jasvir Gonzalez Carotid artery occlusion     Diabetes mellitus ([facility] [address]     Diverticulitis     Hyperlipidemia     Hypertension     Obese     Osteoarthritis     Thyroid nodule        Past Surgical History:        Procedure Laterality Date    CARPAL TUNNEL RELEASE      (L)    CARPAL TUNNEL RELEASE Right 2019    RIGHT CARPAL TUNNEL RELEASE ENDOSCOPIC performed by Vincenzo Runner, MD at [facility] CATH LAB PROCEDURE      stent placed    [place] Right 2019    RIGHT THUMB TRIGGER RELEASE performed by Vincenzo Runner, MD at [facility]  2017    LT knee torn meniscus / DR Aleshia Guadarrama         Social History:    Social History     Tobacco Use    Smoking status: Former Smoker     Packs/day: 1.00     Years: 43.00     Pack years: 43.00     Start date:      Last attempt to quit: 3/18/2011     Years since quittin.8    Smokeless tobacco: Never Used   Substance Use Topics    Alcohol use: No                                Counseling given: Not Answered      Vital Signs (Current): There were no vitals filed for this visit. BP Readings from Last 3 Encounters:   19 130/80   19 116/64   19 (!) 120/58       NPO Status:  Patient advised to be NPO after midnight.  Sips of water for medications the morning of surgery is permissible                                                                               BMI:   Wt Readings from Last 3 Encounters:   20 171 lb (77.6 kg)   19 171 lb (77.6 kg)   19 170 lb (77.1 kg)     There is no height or weight on file to calculate BMI.    CBC:   Lab Results   Component Value Date    WBC 6.1 07/24/2019    RBC 4.26 07/24/2019    HGB 12.8 07/24/2019    HCT 39.5 07/24/2019    MCV 92.7 07/24/2019    RDW 12.5 07/24/2019     07/24/2019       CMP:   Lab Results   Component Value Date     01/26/2019    K 5.0 01/26/2019    CL 99 01/26/2019    CO2 21 01/26/2019    BUN 23 01/26/2019    CREATININE 0.9 01/26/2019    GFRAA >60 01/26/2019    LABGLOM >60 01/26/2019    GLUCOSE 126 01/26/2019    PROT 7.5 01/26/2019    CALCIUM 9.1 01/26/2019    BILITOT 0.4 01/26/2019    ALKPHOS 148 01/26/2019    AST 24 01/26/2019    ALT 28 01/26/2019       POC Tests: No results for input(s): POCGLU, POCNA, POCK, POCCL, POCBUN, POCHEMO, POCHCT in the last 72 hours. Coags: No results found for: PROTIME, INR, APTT    HCG (If Applicable): No results found for: PREGTESTUR, PREGSERUM, HCG, HCGQUANT     ABGs: No results found for: PHART, PO2ART, JTT5ALP, OQJ9TND, BEART, J9KLDQAI     Type & Screen (If Applicable):  No results found for: LABABO, LABRH     EKG 12/10/19  NSR  Rate 66    US Thyroid 9/9/19  Impression   Heterogeneous enlarged thyroid gland with multiple   dominant nodules, unchanged from prior examination given differences   in technique. Findings compatible to goiter. Anesthesia Evaluation  Patient summary reviewed and Nursing notes reviewed no history of anesthetic complications:   Airway: Mallampati: II  TM distance: >3 FB   Neck ROM: full  Mouth opening: > = 3 FB Dental:      Comment: Poor dentition.  Denies any loose chipped cracked teeth    Pulmonary: breath sounds clear to auscultation  (+) sleep apnea (never been tested for but states her  has witnessed her having apneic episodes and snores frequently):      (-) not a current smoker                           Cardiovascular:    (+) hypertension: moderate, past MI: > 6 months, CAD:, CABG/stent:,       ECG reviewed  Rhythm: regular  Rate: normal           Beta Blocker:  Dose

## 2020-01-15 NOTE — H&P
tablet, Rfl: 2    blood glucose monitor strips, PT TESTS TWICE DAILY, Disp: 200 strip, Rfl: 5    famotidine (PEPCID) 20 MG tablet, Take 1 tablet by mouth 2 times daily, Disp: 180 tablet, Rfl: 2    Insulin Pen Needle (PEN NEEDLES) 31G X 6 MM MISC, 1 each by Does not apply route daily, Disp: 100 each, Rfl: 5    quinapril (ACCUPRIL) 5 MG tablet, Take 1 tablet by mouth nightly, Disp: 90 tablet, Rfl: 2    simvastatin (ZOCOR) 80 MG tablet, Take 1 tablet by mouth nightly, Disp: 90 tablet, Rfl: 2    Liraglutide (VICTOZA) 18 MG/3ML SOPN SC injection, INJECT 1.2 MG SUBCUTANEOUSLY DAILY, Disp: 18 mL, Rfl: 5    Alcohol Swabs (B-D SINGLE USE SWABS REGULAR) PADS, USE TO CLEAN FINGER PRIOR TO BLOOD SUGAR CHECK, Disp: 100 each, Rfl: 5    Blood Glucose Monitoring Suppl (ACCU-CHEK STEVEN PLUS) w/Device KIT, CHECK BLOOD SUGAR TWICE DAILY, Disp: 1 kit, Rfl: 0    aspirin 81 MG EC tablet, Take 81 mg by mouth daily. , Disp: , Rfl:     acetaminophen (TYLENOL) 325 MG tablet, Take 650 mg by mouth as needed , Disp: , Rfl:     benzonatate (TESSALON PERLES) 100 MG capsule, Take 1 capsule by mouth 3 times daily as needed for Cough, Disp: 30 capsule, Rfl: 0     Patient has no known allergies. Social History            Tobacco Use    Smoking status: Former Smoker       Packs/day: 1.00       Years: 43.00       Pack years: 43.00       Start date:        Last attempt to quit: 3/18/2011       Years since quittin.6    Smokeless tobacco: Never Used   Substance Use Topics    Alcohol use: No    Drug use: No      Family History         Family History   Problem Relation Age of Onset    Heart Disease Mother      Heart Disease Father      Diabetes Father      Heart Disease Sister      Heart Disease Maternal Grandmother      Heart Disease Paternal Grandmother      Diabetes Paternal Grandmother              Review of Systems   Constitutional: Negative for chills and fever.    HENT: Negative for congestion, ear discharge, hearing loss, rhinorrhea, sinus pressure, tinnitus and voice change. Respiratory: Negative for cough and shortness of breath. Cardiovascular: Negative for chest pain and palpitations. Gastrointestinal: Negative for vomiting. Skin: Negative for rash. Allergic/Immunologic: Negative for environmental allergies. Neurological: Negative for dizziness and headaches. Hematological: Does not bruise/bleed easily. All other systems reviewed and are negative.        /72   Pulse 75   Ht 4' 11\" (1.499 m)   Wt 171 lb (77.6 kg)   BMI 34.54 kg/m²   Physical Exam  Vitals signs and nursing note reviewed. Constitutional:       Appearance: She is well-developed. HENT:      Head: Normocephalic. Right Ear: Tympanic membrane, ear canal and external ear normal.      Left Ear: Tympanic membrane and ear canal normal.      Nose: Congestion present. No nasal deformity or septal deviation. Right Nostril: No foreign body, epistaxis or occlusion. Left Nostril: No foreign body, epistaxis or occlusion. Right Turbinates: Not enlarged or swollen. Left Turbinates: Not enlarged. Right Sinus: No frontal sinus tenderness. Left Sinus: No maxillary sinus tenderness or frontal sinus tenderness. Mouth/Throat:      Lips: Pink. Mouth: Mucous membranes are moist.      Dentition: No dental tenderness or dental caries. Tongue: No lesions. Palate: No mass. Palate does not elevate in midline. Pharynx: No posterior oropharyngeal erythema or uvula swelling. Tonsils: No tonsillar exudate or tonsillar abscesses. Eyes:      Pupils: Pupils are equal, round, and reactive to light. Neck:      Musculoskeletal: Normal range of motion and neck supple. Thyroid: No thyromegaly. Trachea: No tracheal deviation. Cardiovascular:      Rate and Rhythm: Normal rate. Pulmonary:      Effort: Pulmonary effort is normal. No respiratory distress.    Musculoskeletal: Normal range of motion. General: No tenderness. Skin:     General: Skin is warm and dry. Neurological:      Mental Status: She is alert. Cranial Nerves: No cranial nerve deficit.          IMPRESSION/PLAN:  Patient seen and examined for history of multinodular goiter, considerations include repeat ultrasound in 6 months for biopsy of dominant nodules which was recommended today. Other considerations once patient is cleared from her orthopedic surgery standpoint consider possible total thyroidectomy for recurrent thyroid disease. Patient understands risk and benefits surgical intervention will consider all options and follow-up with myself in 6 months or consider surgical resection as indicated. Risk and benefits including bleeding infection tracheostomy loss of voice or hoarseness were all reviewed today.     Dr. Brissa Burr Otolaryngology/Facial Plastic Surgery Residency  Associate Clinical Professor:  Anais Palmer [employer]

## 2020-01-16 ENCOUNTER — ANESTHESIA (OUTPATIENT)
Dept: OPERATING ROOM | Age: 70
End: 2020-01-16
Payer: MEDICARE

## 2020-01-16 ENCOUNTER — HOSPITAL ENCOUNTER (OUTPATIENT)
Age: 70
Setting detail: OBSERVATION
Discharge: HOME | End: 2020-01-17
Attending: OTOLARYNGOLOGY | Admitting: OTOLARYNGOLOGY
Payer: MEDICARE

## 2020-01-16 VITALS — SYSTOLIC BLOOD PRESSURE: 128 MMHG | DIASTOLIC BLOOD PRESSURE: 63 MMHG | OXYGEN SATURATION: 90 %

## 2020-01-16 PROBLEM — E04.1 THYROID NODULE: Status: ACTIVE | Noted: 2020-01-16

## 2020-01-16 LAB
CALCIUM IONIZED: 1.2 MMOL/L (ref 1.15–1.33)
CALCIUM IONIZED: 1.22 MMOL/L (ref 1.15–1.33)
CALCIUM SERPL-MCNC: 8.8 MG/DL (ref 8.6–10.2)
CALCIUM SERPL-MCNC: 9 MG/DL (ref 8.6–10.2)
METER GLUCOSE: 120 MG/DL (ref 74–99)
METER GLUCOSE: 140 MG/DL (ref 74–99)
METER GLUCOSE: 163 MG/DL (ref 74–99)
METER GLUCOSE: 242 MG/DL (ref 74–99)
PARATHYROID HORMONE INTACT: 24 PG/ML (ref 15–65)

## 2020-01-16 PROCEDURE — 2500000003 HC RX 250 WO HCPCS: Performed by: OTOLARYNGOLOGY

## 2020-01-16 PROCEDURE — 7100000001 HC PACU RECOVERY - ADDTL 15 MIN: Performed by: OTOLARYNGOLOGY

## 2020-01-16 PROCEDURE — 82330 ASSAY OF CALCIUM: CPT

## 2020-01-16 PROCEDURE — 3600000004 HC SURGERY LEVEL 4 BASE: Performed by: OTOLARYNGOLOGY

## 2020-01-16 PROCEDURE — 3600000014 HC SURGERY LEVEL 4 ADDTL 15MIN: Performed by: OTOLARYNGOLOGY

## 2020-01-16 PROCEDURE — 82310 ASSAY OF CALCIUM: CPT

## 2020-01-16 PROCEDURE — 6360000002 HC RX W HCPCS: Performed by: OTOLARYNGOLOGY

## 2020-01-16 PROCEDURE — 7100000000 HC PACU RECOVERY - FIRST 15 MIN: Performed by: OTOLARYNGOLOGY

## 2020-01-16 PROCEDURE — 6370000000 HC RX 637 (ALT 250 FOR IP): Performed by: OTOLARYNGOLOGY

## 2020-01-16 PROCEDURE — 60240 REMOVAL OF THYROID: CPT | Performed by: OTOLARYNGOLOGY

## 2020-01-16 PROCEDURE — 6370000000 HC RX 637 (ALT 250 FOR IP): Performed by: STUDENT IN AN ORGANIZED HEALTH CARE EDUCATION/TRAINING PROGRAM

## 2020-01-16 PROCEDURE — 6360000002 HC RX W HCPCS: Performed by: ANESTHESIOLOGY

## 2020-01-16 PROCEDURE — 2500000003 HC RX 250 WO HCPCS

## 2020-01-16 PROCEDURE — 2700000000 HC OXYGEN THERAPY PER DAY

## 2020-01-16 PROCEDURE — 3700000001 HC ADD 15 MINUTES (ANESTHESIA): Performed by: OTOLARYNGOLOGY

## 2020-01-16 PROCEDURE — 2709999900 HC NON-CHARGEABLE SUPPLY: Performed by: OTOLARYNGOLOGY

## 2020-01-16 PROCEDURE — 2580000003 HC RX 258

## 2020-01-16 PROCEDURE — 88307 TISSUE EXAM BY PATHOLOGIST: CPT

## 2020-01-16 PROCEDURE — 3700000000 HC ANESTHESIA ATTENDED CARE: Performed by: OTOLARYNGOLOGY

## 2020-01-16 PROCEDURE — G0378 HOSPITAL OBSERVATION PER HR: HCPCS

## 2020-01-16 PROCEDURE — 83970 ASSAY OF PARATHORMONE: CPT

## 2020-01-16 PROCEDURE — 2720000010 HC SURG SUPPLY STERILE: Performed by: OTOLARYNGOLOGY

## 2020-01-16 PROCEDURE — 82962 GLUCOSE BLOOD TEST: CPT

## 2020-01-16 PROCEDURE — 6360000002 HC RX W HCPCS

## 2020-01-16 PROCEDURE — 2580000003 HC RX 258: Performed by: OTOLARYNGOLOGY

## 2020-01-16 PROCEDURE — 36415 COLL VENOUS BLD VENIPUNCTURE: CPT

## 2020-01-16 RX ORDER — CEPHALEXIN 500 MG/1
500 CAPSULE ORAL 2 TIMES DAILY
Qty: 14 CAPSULE | Refills: 0 | Status: SHIPPED | OUTPATIENT
Start: 2020-01-16 | End: 2020-01-23

## 2020-01-16 RX ORDER — DEXAMETHASONE SODIUM PHOSPHATE 10 MG/ML
INJECTION INTRAMUSCULAR; INTRAVENOUS PRN
Status: DISCONTINUED | OUTPATIENT
Start: 2020-01-16 | End: 2020-01-16 | Stop reason: SDUPTHER

## 2020-01-16 RX ORDER — DEXTROSE MONOHYDRATE 50 MG/ML
100 INJECTION, SOLUTION INTRAVENOUS PRN
Status: DISCONTINUED | OUTPATIENT
Start: 2020-01-16 | End: 2020-01-17 | Stop reason: HOSPADM

## 2020-01-16 RX ORDER — PROPOFOL 10 MG/ML
INJECTION, EMULSION INTRAVENOUS PRN
Status: DISCONTINUED | OUTPATIENT
Start: 2020-01-16 | End: 2020-01-16 | Stop reason: SDUPTHER

## 2020-01-16 RX ORDER — SUCCINYLCHOLINE/SOD CL,ISO/PF 200MG/10ML
SYRINGE (ML) INTRAVENOUS PRN
Status: DISCONTINUED | OUTPATIENT
Start: 2020-01-16 | End: 2020-01-16 | Stop reason: SDUPTHER

## 2020-01-16 RX ORDER — MIDAZOLAM HYDROCHLORIDE 1 MG/ML
INJECTION INTRAMUSCULAR; INTRAVENOUS PRN
Status: DISCONTINUED | OUTPATIENT
Start: 2020-01-16 | End: 2020-01-16 | Stop reason: SDUPTHER

## 2020-01-16 RX ORDER — VENLAFAXINE HYDROCHLORIDE 150 MG/1
150 CAPSULE, EXTENDED RELEASE ORAL DAILY
Status: DISCONTINUED | OUTPATIENT
Start: 2020-01-17 | End: 2020-01-17 | Stop reason: HOSPADM

## 2020-01-16 RX ORDER — OXYCODONE HYDROCHLORIDE 5 MG/1
5 TABLET ORAL EVERY 4 HOURS PRN
Status: DISCONTINUED | OUTPATIENT
Start: 2020-01-16 | End: 2020-01-17 | Stop reason: HOSPADM

## 2020-01-16 RX ORDER — DEXTROSE, SODIUM CHLORIDE, AND POTASSIUM CHLORIDE 5; .45; .15 G/100ML; G/100ML; G/100ML
INJECTION INTRAVENOUS CONTINUOUS
Status: DISCONTINUED | OUTPATIENT
Start: 2020-01-16 | End: 2020-01-17 | Stop reason: HOSPADM

## 2020-01-16 RX ORDER — NICOTINE POLACRILEX 4 MG
15 LOZENGE BUCCAL PRN
Status: DISCONTINUED | OUTPATIENT
Start: 2020-01-16 | End: 2020-01-17 | Stop reason: HOSPADM

## 2020-01-16 RX ORDER — ATENOLOL 50 MG/1
50 TABLET ORAL DAILY
Status: DISCONTINUED | OUTPATIENT
Start: 2020-01-17 | End: 2020-01-17 | Stop reason: HOSPADM

## 2020-01-16 RX ORDER — LIDOCAINE HYDROCHLORIDE AND EPINEPHRINE 10; 10 MG/ML; UG/ML
INJECTION, SOLUTION INFILTRATION; PERINEURAL PRN
Status: DISCONTINUED | OUTPATIENT
Start: 2020-01-16 | End: 2020-01-16 | Stop reason: ALTCHOICE

## 2020-01-16 RX ORDER — PHENYLEPHRINE HYDROCHLORIDE 10 MG/ML
INJECTION INTRAVENOUS PRN
Status: DISCONTINUED | OUTPATIENT
Start: 2020-01-16 | End: 2020-01-16 | Stop reason: SDUPTHER

## 2020-01-16 RX ORDER — SODIUM CHLORIDE 0.9 % (FLUSH) 0.9 %
10 SYRINGE (ML) INJECTION EVERY 12 HOURS SCHEDULED
Status: DISCONTINUED | OUTPATIENT
Start: 2020-01-16 | End: 2020-01-17 | Stop reason: HOSPADM

## 2020-01-16 RX ORDER — HYDROCODONE BITARTRATE AND ACETAMINOPHEN 5; 325 MG/1; MG/1
1 TABLET ORAL EVERY 6 HOURS PRN
Qty: 20 TABLET | Refills: 0 | Status: SHIPPED | OUTPATIENT
Start: 2020-01-16 | End: 2020-01-23

## 2020-01-16 RX ORDER — LIDOCAINE HYDROCHLORIDE 20 MG/ML
INJECTION, SOLUTION INTRAVENOUS PRN
Status: DISCONTINUED | OUTPATIENT
Start: 2020-01-16 | End: 2020-01-16 | Stop reason: SDUPTHER

## 2020-01-16 RX ORDER — CEFAZOLIN SODIUM 1 G/50ML
1 SOLUTION INTRAVENOUS ONCE
Status: COMPLETED | OUTPATIENT
Start: 2020-01-16 | End: 2020-01-16

## 2020-01-16 RX ORDER — DEXTROSE MONOHYDRATE 25 G/50ML
12.5 INJECTION, SOLUTION INTRAVENOUS PRN
Status: DISCONTINUED | OUTPATIENT
Start: 2020-01-16 | End: 2020-01-17 | Stop reason: HOSPADM

## 2020-01-16 RX ORDER — PROPOFOL 10 MG/ML
INJECTION, EMULSION INTRAVENOUS CONTINUOUS PRN
Status: DISCONTINUED | OUTPATIENT
Start: 2020-01-16 | End: 2020-01-16 | Stop reason: SDUPTHER

## 2020-01-16 RX ORDER — SODIUM CHLORIDE 0.9 % (FLUSH) 0.9 %
10 SYRINGE (ML) INJECTION PRN
Status: DISCONTINUED | OUTPATIENT
Start: 2020-01-16 | End: 2020-01-17 | Stop reason: HOSPADM

## 2020-01-16 RX ORDER — ACETAMINOPHEN 325 MG/1
650 TABLET ORAL EVERY 4 HOURS PRN
Status: DISCONTINUED | OUTPATIENT
Start: 2020-01-16 | End: 2020-01-17 | Stop reason: HOSPADM

## 2020-01-16 RX ORDER — FAMOTIDINE 20 MG/1
20 TABLET, FILM COATED ORAL 2 TIMES DAILY
Status: DISCONTINUED | OUTPATIENT
Start: 2020-01-16 | End: 2020-01-17 | Stop reason: HOSPADM

## 2020-01-16 RX ORDER — FENTANYL CITRATE 50 UG/ML
INJECTION, SOLUTION INTRAMUSCULAR; INTRAVENOUS PRN
Status: DISCONTINUED | OUTPATIENT
Start: 2020-01-16 | End: 2020-01-16 | Stop reason: SDUPTHER

## 2020-01-16 RX ORDER — SODIUM CHLORIDE 0.9 % (FLUSH) 0.9 %
10 SYRINGE (ML) INJECTION PRN
Status: DISCONTINUED | OUTPATIENT
Start: 2020-01-16 | End: 2020-01-16 | Stop reason: HOSPADM

## 2020-01-16 RX ORDER — MORPHINE SULFATE 2 MG/ML
2 INJECTION, SOLUTION INTRAMUSCULAR; INTRAVENOUS EVERY 5 MIN PRN
Status: DISCONTINUED | OUTPATIENT
Start: 2020-01-16 | End: 2020-01-16 | Stop reason: HOSPADM

## 2020-01-16 RX ORDER — OXYCODONE HYDROCHLORIDE 10 MG/1
10 TABLET ORAL EVERY 4 HOURS PRN
Status: DISCONTINUED | OUTPATIENT
Start: 2020-01-16 | End: 2020-01-17 | Stop reason: HOSPADM

## 2020-01-16 RX ORDER — MORPHINE SULFATE 2 MG/ML
1 INJECTION, SOLUTION INTRAMUSCULAR; INTRAVENOUS EVERY 5 MIN PRN
Status: DISCONTINUED | OUTPATIENT
Start: 2020-01-16 | End: 2020-01-16 | Stop reason: HOSPADM

## 2020-01-16 RX ORDER — SODIUM CHLORIDE 0.9 % (FLUSH) 0.9 %
10 SYRINGE (ML) INJECTION EVERY 12 HOURS SCHEDULED
Status: DISCONTINUED | OUTPATIENT
Start: 2020-01-16 | End: 2020-01-16 | Stop reason: HOSPADM

## 2020-01-16 RX ORDER — CEFAZOLIN SODIUM 2 G/50ML
2 SOLUTION INTRAVENOUS EVERY 8 HOURS
Status: COMPLETED | OUTPATIENT
Start: 2020-01-16 | End: 2020-01-17

## 2020-01-16 RX ORDER — MORPHINE SULFATE 2 MG/ML
2 INJECTION, SOLUTION INTRAMUSCULAR; INTRAVENOUS
Status: DISCONTINUED | OUTPATIENT
Start: 2020-01-16 | End: 2020-01-17 | Stop reason: HOSPADM

## 2020-01-16 RX ORDER — ROCURONIUM BROMIDE 10 MG/ML
INJECTION, SOLUTION INTRAVENOUS PRN
Status: DISCONTINUED | OUTPATIENT
Start: 2020-01-16 | End: 2020-01-16 | Stop reason: SDUPTHER

## 2020-01-16 RX ORDER — LEVOTHYROXINE SODIUM 125 MCG
125 TABLET ORAL DAILY
Qty: 30 TABLET | Refills: 3 | Status: SHIPPED | OUTPATIENT
Start: 2020-01-16 | End: 2020-03-18 | Stop reason: DRUGHIGH

## 2020-01-16 RX ORDER — ONDANSETRON 2 MG/ML
4 INJECTION INTRAMUSCULAR; INTRAVENOUS EVERY 6 HOURS PRN
Status: DISCONTINUED | OUTPATIENT
Start: 2020-01-16 | End: 2020-01-17 | Stop reason: HOSPADM

## 2020-01-16 RX ORDER — CEFAZOLIN SODIUM 1 G/3ML
1 INJECTION, POWDER, FOR SOLUTION INTRAMUSCULAR; INTRAVENOUS ONCE
Status: DISCONTINUED | OUTPATIENT
Start: 2020-01-16 | End: 2020-01-16 | Stop reason: SDUPTHER

## 2020-01-16 RX ORDER — LISINOPRIL 5 MG/1
2.5 TABLET ORAL DAILY
Status: DISCONTINUED | OUTPATIENT
Start: 2020-01-17 | End: 2020-01-17 | Stop reason: HOSPADM

## 2020-01-16 RX ORDER — MORPHINE SULFATE 4 MG/ML
4 INJECTION, SOLUTION INTRAMUSCULAR; INTRAVENOUS
Status: DISCONTINUED | OUTPATIENT
Start: 2020-01-16 | End: 2020-01-17 | Stop reason: HOSPADM

## 2020-01-16 RX ORDER — SODIUM CHLORIDE 9 MG/ML
INJECTION, SOLUTION INTRAVENOUS CONTINUOUS PRN
Status: DISCONTINUED | OUTPATIENT
Start: 2020-01-16 | End: 2020-01-16 | Stop reason: SDUPTHER

## 2020-01-16 RX ORDER — ONDANSETRON 2 MG/ML
INJECTION INTRAMUSCULAR; INTRAVENOUS PRN
Status: DISCONTINUED | OUTPATIENT
Start: 2020-01-16 | End: 2020-01-16 | Stop reason: SDUPTHER

## 2020-01-16 RX ORDER — CEFAZOLIN SODIUM 2 G/50ML
2 SOLUTION INTRAVENOUS EVERY 8 HOURS
Status: DISCONTINUED | OUTPATIENT
Start: 2020-01-16 | End: 2020-01-16 | Stop reason: SDUPTHER

## 2020-01-16 RX ADMIN — Medication 120 MG: at 07:18

## 2020-01-16 RX ADMIN — DEXAMETHASONE SODIUM PHOSPHATE 10 MG: 10 INJECTION INTRAMUSCULAR; INTRAVENOUS at 07:46

## 2020-01-16 RX ADMIN — DEXTROSE MONOHYDRATE, SODIUM CHLORIDE, AND POTASSIUM CHLORIDE: 50; 4.5; 1.49 INJECTION, SOLUTION INTRAVENOUS at 15:32

## 2020-01-16 RX ADMIN — LIDOCAINE HYDROCHLORIDE 100 MG: 20 INJECTION, SOLUTION INTRAVENOUS at 07:18

## 2020-01-16 RX ADMIN — PHENYLEPHRINE HYDROCHLORIDE 200 MCG: 10 INJECTION INTRAVENOUS at 08:09

## 2020-01-16 RX ADMIN — SODIUM CHLORIDE, PRESERVATIVE FREE 10 ML: 5 INJECTION INTRAVENOUS at 15:35

## 2020-01-16 RX ADMIN — MIDAZOLAM 2 MG: 1 INJECTION INTRAMUSCULAR; INTRAVENOUS at 07:13

## 2020-01-16 RX ADMIN — SODIUM CHLORIDE: 9 INJECTION, SOLUTION INTRAVENOUS at 09:58

## 2020-01-16 RX ADMIN — SODIUM CHLORIDE: 9 INJECTION, SOLUTION INTRAVENOUS at 07:10

## 2020-01-16 RX ADMIN — PROPOFOL 130 MG: 10 INJECTION, EMULSION INTRAVENOUS at 07:18

## 2020-01-16 RX ADMIN — CEFAZOLIN SODIUM 2 G: 1 SOLUTION INTRAVENOUS at 07:34

## 2020-01-16 RX ADMIN — OXYCODONE HYDROCHLORIDE 10 MG: 10 TABLET ORAL at 22:13

## 2020-01-16 RX ADMIN — FAMOTIDINE 20 MG: 20 TABLET, FILM COATED ORAL at 22:13

## 2020-01-16 RX ADMIN — ONDANSETRON HYDROCHLORIDE 4 MG: 2 INJECTION, SOLUTION INTRAMUSCULAR; INTRAVENOUS at 09:46

## 2020-01-16 RX ADMIN — ROCURONIUM BROMIDE 10 MG: 10 INJECTION, SOLUTION INTRAVENOUS at 07:18

## 2020-01-16 RX ADMIN — PROPOFOL 100 MCG/KG/MIN: 10 INJECTION, EMULSION INTRAVENOUS at 07:28

## 2020-01-16 RX ADMIN — FENTANYL CITRATE 100 MCG: 50 INJECTION, SOLUTION INTRAMUSCULAR; INTRAVENOUS at 07:18

## 2020-01-16 RX ADMIN — CEFAZOLIN SODIUM 2 G: 2 SOLUTION INTRAVENOUS at 15:33

## 2020-01-16 RX ADMIN — INSULIN LISPRO 2 UNITS: 100 INJECTION, SOLUTION INTRAVENOUS; SUBCUTANEOUS at 22:13

## 2020-01-16 RX ADMIN — PHENYLEPHRINE HYDROCHLORIDE 100 MCG: 10 INJECTION INTRAVENOUS at 08:28

## 2020-01-16 RX ADMIN — MORPHINE SULFATE 1 MG: 2 INJECTION, SOLUTION INTRAMUSCULAR; INTRAVENOUS at 11:58

## 2020-01-16 RX ADMIN — PHENYLEPHRINE HYDROCHLORIDE 100 MCG: 10 INJECTION INTRAVENOUS at 10:16

## 2020-01-16 RX ADMIN — PHENYLEPHRINE HYDROCHLORIDE 100 MCG: 10 INJECTION INTRAVENOUS at 08:17

## 2020-01-16 ASSESSMENT — PAIN DESCRIPTION - PROGRESSION
CLINICAL_PROGRESSION: NOT CHANGED

## 2020-01-16 ASSESSMENT — PULMONARY FUNCTION TESTS
PIF_VALUE: 29
PIF_VALUE: 21
PIF_VALUE: 29
PIF_VALUE: 26
PIF_VALUE: 3
PIF_VALUE: 27
PIF_VALUE: 28
PIF_VALUE: 28
PIF_VALUE: 19
PIF_VALUE: 29
PIF_VALUE: 29
PIF_VALUE: 28
PIF_VALUE: 27
PIF_VALUE: 29
PIF_VALUE: 1
PIF_VALUE: 28
PIF_VALUE: 27
PIF_VALUE: 19
PIF_VALUE: 29
PIF_VALUE: 12
PIF_VALUE: 22
PIF_VALUE: 29
PIF_VALUE: 29
PIF_VALUE: 1
PIF_VALUE: 30
PIF_VALUE: 30
PIF_VALUE: 29
PIF_VALUE: 30
PIF_VALUE: 28
PIF_VALUE: 28
PIF_VALUE: 21
PIF_VALUE: 19
PIF_VALUE: 28
PIF_VALUE: 4
PIF_VALUE: 31
PIF_VALUE: 29
PIF_VALUE: 30
PIF_VALUE: 29
PIF_VALUE: 30
PIF_VALUE: 19
PIF_VALUE: 27
PIF_VALUE: 26
PIF_VALUE: 30
PIF_VALUE: 30
PIF_VALUE: 29
PIF_VALUE: 24
PIF_VALUE: 28
PIF_VALUE: 30
PIF_VALUE: 30
PIF_VALUE: 19
PIF_VALUE: 27
PIF_VALUE: 6
PIF_VALUE: 28
PIF_VALUE: 29
PIF_VALUE: 30
PIF_VALUE: 27
PIF_VALUE: 28
PIF_VALUE: 29
PIF_VALUE: 28
PIF_VALUE: 28
PIF_VALUE: 30
PIF_VALUE: 27
PIF_VALUE: 28
PIF_VALUE: 30
PIF_VALUE: 28
PIF_VALUE: 30
PIF_VALUE: 30
PIF_VALUE: 28
PIF_VALUE: 29
PIF_VALUE: 29
PIF_VALUE: 28
PIF_VALUE: 29
PIF_VALUE: 27
PIF_VALUE: 23
PIF_VALUE: 22
PIF_VALUE: 29
PIF_VALUE: 1
PIF_VALUE: 29
PIF_VALUE: 19
PIF_VALUE: 30
PIF_VALUE: 27
PIF_VALUE: 30
PIF_VALUE: 29
PIF_VALUE: 27
PIF_VALUE: 29
PIF_VALUE: 30
PIF_VALUE: 27
PIF_VALUE: 28
PIF_VALUE: 28
PIF_VALUE: 27
PIF_VALUE: 29
PIF_VALUE: 30
PIF_VALUE: 28
PIF_VALUE: 22
PIF_VALUE: 28
PIF_VALUE: 29
PIF_VALUE: 30
PIF_VALUE: 27
PIF_VALUE: 28
PIF_VALUE: 28
PIF_VALUE: 30
PIF_VALUE: 29
PIF_VALUE: 19
PIF_VALUE: 1
PIF_VALUE: 30
PIF_VALUE: 22
PIF_VALUE: 27
PIF_VALUE: 28
PIF_VALUE: 28
PIF_VALUE: 12
PIF_VALUE: 22
PIF_VALUE: 29
PIF_VALUE: 28
PIF_VALUE: 29
PIF_VALUE: 28
PIF_VALUE: 29
PIF_VALUE: 30
PIF_VALUE: 30
PIF_VALUE: 28
PIF_VALUE: 26
PIF_VALUE: 27
PIF_VALUE: 27
PIF_VALUE: 29
PIF_VALUE: 28
PIF_VALUE: 12
PIF_VALUE: 3
PIF_VALUE: 30
PIF_VALUE: 12
PIF_VALUE: 24
PIF_VALUE: 19
PIF_VALUE: 3
PIF_VALUE: 29
PIF_VALUE: 29
PIF_VALUE: 13
PIF_VALUE: 9
PIF_VALUE: 29
PIF_VALUE: 27
PIF_VALUE: 28
PIF_VALUE: 29
PIF_VALUE: 22
PIF_VALUE: 29
PIF_VALUE: 30
PIF_VALUE: 19
PIF_VALUE: 29
PIF_VALUE: 12
PIF_VALUE: 28
PIF_VALUE: 3
PIF_VALUE: 29
PIF_VALUE: 30
PIF_VALUE: 29
PIF_VALUE: 29
PIF_VALUE: 30
PIF_VALUE: 29
PIF_VALUE: 3
PIF_VALUE: 27
PIF_VALUE: 28
PIF_VALUE: 30
PIF_VALUE: 28
PIF_VALUE: 29
PIF_VALUE: 30
PIF_VALUE: 29
PIF_VALUE: 28
PIF_VALUE: 29
PIF_VALUE: 28
PIF_VALUE: 29
PIF_VALUE: 28
PIF_VALUE: 30
PIF_VALUE: 0
PIF_VALUE: 29
PIF_VALUE: 26
PIF_VALUE: 28
PIF_VALUE: 20
PIF_VALUE: 29
PIF_VALUE: 21
PIF_VALUE: 29
PIF_VALUE: 29
PIF_VALUE: 27
PIF_VALUE: 29
PIF_VALUE: 28
PIF_VALUE: 29
PIF_VALUE: 19
PIF_VALUE: 5

## 2020-01-16 ASSESSMENT — PAIN DESCRIPTION - PAIN TYPE
TYPE: ACUTE PAIN
TYPE: ACUTE PAIN
TYPE: CHRONIC PAIN

## 2020-01-16 ASSESSMENT — PAIN DESCRIPTION - DESCRIPTORS
DESCRIPTORS: ACHING;DISCOMFORT
DESCRIPTORS: ACHING;DULL
DESCRIPTORS: ACHING;DULL;DISCOMFORT

## 2020-01-16 ASSESSMENT — PAIN SCALES - GENERAL
PAINLEVEL_OUTOF10: 0
PAINLEVEL_OUTOF10: 0
PAINLEVEL_OUTOF10: 5
PAINLEVEL_OUTOF10: 5
PAINLEVEL_OUTOF10: 0
PAINLEVEL_OUTOF10: 8
PAINLEVEL_OUTOF10: 0
PAINLEVEL_OUTOF10: 6
PAINLEVEL_OUTOF10: 0

## 2020-01-16 ASSESSMENT — PAIN DESCRIPTION - FREQUENCY
FREQUENCY: CONTINUOUS
FREQUENCY: INTERMITTENT
FREQUENCY: INTERMITTENT

## 2020-01-16 ASSESSMENT — PAIN DESCRIPTION - LOCATION
LOCATION: NECK
LOCATION: BACK
LOCATION: NECK

## 2020-01-16 ASSESSMENT — PAIN DESCRIPTION - ONSET
ONSET: GRADUAL
ONSET: GRADUAL

## 2020-01-16 ASSESSMENT — PAIN - FUNCTIONAL ASSESSMENT: PAIN_FUNCTIONAL_ASSESSMENT: 0-10

## 2020-01-16 NOTE — H&P
This is my H&P update. There will not be another H&P update, you may take the patient back to the operating room if you have read this. I have seen and examined the patient, I reviewed the H&P, there are no new changes. This is the end of the H&P update      Dr. Joey Perkins D.O., Ms. Prabha Michael.  Otolaryngology Facial Plastic Surgery  : [facility] Otolaryngology/Facial Plastic Surgery Residency    Associate Clinical Professor: Kaylee Marcial, [facility]

## 2020-01-16 NOTE — OP NOTE
[address]                  [address] [facility]  [facility]                                OPERATIVE REPORT    PATIENT NAME: Modesta Canela                :        1950  MED REC NO:   [id number]                            ROOM:  ACCOUNT NO:   [de-identified]                           ADMIT DATE: 2020  PROVIDER:     Gavin Oliva DO    DATE OF PROCEDURE:  2020    PREOPERATIVE DIAGNOSIS:  Multinodular goiter. POSTOPERATIVE DIAGNOSIS:  Multinodular goiter. OPERATION PERFORMED:  Total thyroidectomy with nerve integrity monitor. SURGEON:  Piper Walters DO    ASSISTANT:  Gavin Oliva DO, PGY-5; Staci Mireles DO, PGY-3. ANESTHESIA:  General.    ESTIMATED BLOOD LOSS:  30 mL. SPECIMENS  OBTAINED:  Left and right thyroid lobes. COMPLICATIONS:  None. INDICATIONS:  The patient is a 51-year-old female with a history of a  multinodular compressive goiter. Left side was noted to be bigger than  the right. The patient had biopsies previously which were negative at  an outside facility. The patient refused any further biopsies. The  risks, benefits and alternatives of procedure including risk to the  recurrent laryngeal nerve was specifically addressed with the patient. The patient expressed understanding and agreed to proceed. INTRAOPERATIVE FINDINGS:  Multinodular goiter, left side larger than the  right. Left side adherent to surrounding tissues with multiple nodules  and lobules. Recurrent laryngeal nerve stimulated bilaterally at the  end of the case. Left side not directly visualized, right side directly  visualized. Bilateral parathyroids identified and left in the operative  field. Closed suction drain placed. 3 gm Cathy placed. DESCRIPTION OF PROCEDURE:  The patient was brought into the OR and  placed supine on the operating room table.   Sequential compression  devices were on and functioning at not as adherent to the  surrounding tissue. It was able to be ruled out and dissection carried  along the posterior aspect. The recurrent laryngeal nerve was  identified and stimulated. Walters forceps were used to skeletonize the  nerve and further mobilize the gland off the trachea. It was removed  and sent to pathology for permanent section. The patient's neck was  then irrigated copiously with normal saline. It was suctioned clear. Hemostasis was obtained with bipolar cautery. We placed 3 gm of Cathy  in the wound bed. Closed suction drain was placed in the wound as well. The wound was then reapproximated in multiple layers including 2-0  Vicryl at the straps. The platysma and dermis were reapproximated  buried 3-0 Vicryl in a layered fashion. The skin was run with 4-0  Monocryl in a subcuticular fashion. Steri-Strips and Mastisol were then  placed over the skin. The patient was sent back to Anesthesia for  appropriate awakening. All counts were reported as correct x2. The  patient tolerated the procedure well without complications. The patient  is expected to be discharged home today following appropriate recovery  in the PACU. Dr. Joya Rockwell was present throughout the entirety of  the procedure.         Inder Ortiz DO    D: 01/16/2020 10:30:51       T: 01/16/2020 12:06:27     /[email or web address]  Job#: [id number]     Doc#: [id number]    CC:

## 2020-01-17 VITALS
DIASTOLIC BLOOD PRESSURE: 56 MMHG | BODY MASS INDEX: 34.47 KG/M2 | HEART RATE: 84 BPM | HEIGHT: 59 IN | RESPIRATION RATE: 16 BRPM | OXYGEN SATURATION: 96 % | WEIGHT: 171 LBS | SYSTOLIC BLOOD PRESSURE: 115 MMHG | TEMPERATURE: 96.7 F

## 2020-01-17 LAB — METER GLUCOSE: 140 MG/DL (ref 74–99)

## 2020-01-17 PROCEDURE — 82962 GLUCOSE BLOOD TEST: CPT

## 2020-01-17 PROCEDURE — 6370000000 HC RX 637 (ALT 250 FOR IP): Performed by: STUDENT IN AN ORGANIZED HEALTH CARE EDUCATION/TRAINING PROGRAM

## 2020-01-17 PROCEDURE — 6370000000 HC RX 637 (ALT 250 FOR IP): Performed by: OTOLARYNGOLOGY

## 2020-01-17 PROCEDURE — 6360000002 HC RX W HCPCS: Performed by: OTOLARYNGOLOGY

## 2020-01-17 PROCEDURE — G0378 HOSPITAL OBSERVATION PER HR: HCPCS

## 2020-01-17 RX ADMIN — INSULIN LISPRO 2 UNITS: 100 INJECTION, SOLUTION INTRAVENOUS; SUBCUTANEOUS at 08:39

## 2020-01-17 RX ADMIN — CEFAZOLIN SODIUM 2 G: 2 SOLUTION INTRAVENOUS at 00:33

## 2020-01-17 RX ADMIN — VENLAFAXINE HYDROCHLORIDE 150 MG: 150 CAPSULE, EXTENDED RELEASE ORAL at 08:40

## 2020-01-17 RX ADMIN — FAMOTIDINE 20 MG: 20 TABLET, FILM COATED ORAL at 08:40

## 2020-01-17 RX ADMIN — ATENOLOL 50 MG: 50 TABLET ORAL at 08:39

## 2020-01-17 ASSESSMENT — PAIN SCALES - GENERAL: PAINLEVEL_OUTOF10: 0

## 2020-01-17 NOTE — PROGRESS NOTES
OTOLARYNGOLOGY  DAILY PROGRESS NOTE  2020    Subjective:     Patient is doing well. Reports she is breathing well and her voice is strong. Objective:     BP (!) 115/56   Pulse 84   Temp 96.7 °F (35.9 °C) (Temporal)   Resp 16   Ht 4' 11\" (1.499 m)   Wt 171 lb (77.6 kg)   SpO2 96%   BMI 34.54 kg/m²   PULSE OXIMETRY RANGE: SpO2  Av %  Min: 92 %  Max: 99 %  I/O last 3 completed shifts: In: 8625 [P.O.:240; I.V.:1300]  Out: 120 [Drains:70; Blood:50]    GENERAL:  Awake, alert, cooperative, no apparent distress  HENT: Normocephalic, atraumatic. Incision is clean, dry, intact. No evidence of hematoma or infection. Drain with 25 cc SS output in the bulb. Voice is strong.   EYES: No sclera icterus, pupils equal  LUNGS:  No increased work of breathing, no stridor  CARDIOVASCULAR:  RR      Assessment/Plan:     71 y.o. female postop day 1 status total thyroidectomy     Saw and examined the patient   Discussed with Dr. Donovan Gilliam to DC today   Start Synthroid    Electronically signed by Prakash Milligan DO on 2020 at 12:24 PM

## 2020-01-17 NOTE — CARE COORDINATION
The patent  camen for a thyroidectomy and is feeling better this am and is medically stable and the plan is for the patient to return home later today .  Thanks Joe-Elvira

## 2020-01-17 NOTE — DISCHARGE SUMMARY
without obvious abnormality, atraumatic  Neck: Incision is clean, dry, intact. No evidence of hematoma or infection. Lungs: clear to auscultation bilaterally  Heart: regular rate and rhythm  Abdomen: soft, non-tender; bowel sounds normal; no masses,  no organomegaly  Extremities: extremities normal, atraumatic, no cyanosis or edema    Disposition: home       Medication List      START taking these medications    cephALEXin 500 MG capsule  Commonly known as:  KEFLEX  Take 1 capsule by mouth 2 times daily for 7 days     HYDROcodone-acetaminophen 5-325 MG per tablet  Commonly known as:  NORCO  Take 1 tablet by mouth every 6 hours as needed for Pain for up to 7 days. SYNTHROID 125 MCG tablet  Generic drug:  levothyroxine  Take 1 tablet by mouth Daily        CHANGE how you take these medications    aspirin 81 MG EC tablet  Take 1 tablet by mouth daily LD 12-4-19  What changed:  additional instructions     atenolol 25 MG tablet  Commonly known as:  TENORMIN  Take 2 tablets by mouth daily  What changed:  additional instructions        CONTINUE taking these medications    ACCU-CHEK STEVEN PLUS w/Device Kit  CHECK BLOOD SUGAR TWICE DAILY     acetaminophen 500 MG tablet  Commonly known as:  TYLENOL  Take 1 tablet by mouth 4 times daily as needed for Pain     B-D SINGLE USE SWABS REGULAR Pads  Use 3 alcohol pads PRN when testing sugar.      blood glucose test strips  PT TESTS TWICE DAILY     diclofenac sodium 1 % Gel  Apply 2 g topically 4 times daily     famotidine 20 MG tablet  Commonly known as:  PEPCID  Take 1 tablet by mouth 2 times daily     Lancets Misc  PT TESTS TWICE DAILY     Liraglutide 18 MG/3ML Sopn SC injection  Commonly known as:  VICTOZA  INJECT 1.2 MG SUBCUTANEOUSLY DAILY     Pen Needles 31G X 6 MM Misc  1 each by Does not apply route daily     quinapril 5 MG tablet  Commonly known as:  ACCUPRIL  Take 1 tablet by mouth nightly     simvastatin 80 MG tablet  Commonly known as:  ZOCOR  Take 1 tablet by mouth nightly     venlafaxine 150 MG extended release capsule  Commonly known as:  EFFEXOR XR  TAKE 1 CAPSULE EVERY DAY           Where to Get Your Medications      These medications were sent to [address] [place] [address], [address] [place] [address] Road    Phone:  [phone]   · SYNTHROID 125 MCG tablet     You can get these medications from any pharmacy    Bring a paper prescription for each of these medications  · cephALEXin 500 MG capsule  · HYDROcodone-acetaminophen 5-325 MG per tablet         Patient Instructions: Activity: no strenuous activity until cleared by physician at post-op appointment   Diet: regular diet  Wound Care: If incision is open to air, okay to use antibiotic ointment. If there are Steri-Strips, there is we will follow-up in 1 week. Do not soak incision, okay to shower 48 hours after surgery. Follow-up with Dr. Contreras Simental in 1 week          .     Signed:  Jeancarlos Marino  1/17/2020  12:25 PM

## 2020-01-20 ENCOUNTER — TELEPHONE (OUTPATIENT)
Dept: ENT CLINIC | Age: 70
End: 2020-01-20

## 2020-01-24 ENCOUNTER — OFFICE VISIT (OUTPATIENT)
Dept: ENT CLINIC | Age: 70
End: 2020-01-24

## 2020-01-24 VITALS
HEART RATE: 72 BPM | WEIGHT: 169 LBS | DIASTOLIC BLOOD PRESSURE: 70 MMHG | SYSTOLIC BLOOD PRESSURE: 132 MMHG | BODY MASS INDEX: 34.07 KG/M2 | HEIGHT: 59 IN

## 2020-01-24 PROCEDURE — 99024 POSTOP FOLLOW-UP VISIT: CPT | Performed by: OTOLARYNGOLOGY

## 2020-01-30 ASSESSMENT — ENCOUNTER SYMPTOMS
VOMITING: 0
SINUS PAIN: 0
SHORTNESS OF BREATH: 0
COUGH: 0

## 2020-01-30 NOTE — PROGRESS NOTES
Abdominal:      General: There is no distension. Tenderness: There is no abdominal tenderness. There is no guarding. Musculoskeletal: Normal range of motion. Skin:     General: Skin is warm and dry. Neurological:      Mental Status: She is alert and oriented to person, place, and time. Psychiatric:         Behavior: Behavior normal.         Thought Content: Thought content normal.         Judgment: Judgment normal.           IMPRESSION/PLAN:  As post total thyroidectomy, continue current weight-based Synthroid, calcium levels and voice are all returning to normal status, follow-up with ENT in 6 weeks or sooner with any noted changes or concerns. Dr. Scot Perkins D.O., Ms. Cristiana Child Otolaryngology/Facial Plastic Surgery Residency  Associate Clinical Professor:  Jono Pino, [facility]

## 2020-02-04 ENCOUNTER — PATIENT MESSAGE (OUTPATIENT)
Dept: FAMILY MEDICINE CLINIC | Age: 70
End: 2020-02-04

## 2020-02-06 RX ORDER — ATENOLOL 25 MG/1
TABLET ORAL
Qty: 180 TABLET | Refills: 2 | Status: SHIPPED
Start: 2020-02-06 | End: 2020-06-12 | Stop reason: SDUPTHER

## 2020-02-06 RX ORDER — NITROFURANTOIN 25; 75 MG/1; MG/1
100 CAPSULE ORAL 2 TIMES DAILY
Qty: 10 CAPSULE | Refills: 0 | Status: SHIPPED | OUTPATIENT
Start: 2020-02-06 | End: 2020-02-11

## 2020-02-06 NOTE — TELEPHONE ENCOUNTER
From: Giorgi Duran  To: ROXY Chaidez - CNP  Sent: 2/4/2020 3:48 PM EST  Subject: Non-Urgent Medical Question    Pretty sure I have another UTI Could I get something for it pretty please.  Thanks

## 2020-02-06 NOTE — TELEPHONE ENCOUNTER
Thank you for using [employer]. We have received your message and are currently  addressing your request.  We will get back to you as soon as possible. Thank you.

## 2020-02-19 ENCOUNTER — PATIENT MESSAGE (OUTPATIENT)
Dept: FAMILY MEDICINE CLINIC | Age: 70
End: 2020-02-19

## 2020-02-21 NOTE — TELEPHONE ENCOUNTER
Thank you for using [employer]. We have received your message and are currently  addressing your request.  We will get back to you as soon as possible. Thank you.

## 2020-02-21 NOTE — TELEPHONE ENCOUNTER
From: Curly Matthews  To: ROXY Robbins - CNP  Sent: 2/19/2020 4:36 PM EST  Subject: Non-Urgent Medical Question    Sheldon [place]. I need a referral sent to [facility] for me to have a diabetic foot exam with Dr Bobby Gowers. I already saw him but they won't pay for it without your referral. Thanks.  .   My feet are fine

## 2020-02-24 ENCOUNTER — HOSPITAL ENCOUNTER (OUTPATIENT)
Age: 70
Discharge: HOME | End: 2020-02-26
Payer: MEDICARE

## 2020-02-24 LAB — TSH SERPL DL<=0.05 MIU/L-ACNC: 0.1 UIU/ML (ref 0.27–4.2)

## 2020-02-24 PROCEDURE — 36415 COLL VENOUS BLD VENIPUNCTURE: CPT

## 2020-02-24 PROCEDURE — 84443 ASSAY THYROID STIM HORMONE: CPT

## 2020-03-06 ENCOUNTER — OFFICE VISIT (OUTPATIENT)
Dept: ENT CLINIC | Age: 70
End: 2020-03-06

## 2020-03-06 VITALS — BODY MASS INDEX: 34.07 KG/M2 | HEIGHT: 59 IN | WEIGHT: 169 LBS

## 2020-03-06 PROCEDURE — 99024 POSTOP FOLLOW-UP VISIT: CPT | Performed by: OTOLARYNGOLOGY

## 2020-03-06 RX ORDER — LEVOTHYROXINE SODIUM 125 MCG
125 TABLET ORAL DAILY
Qty: 30 TABLET | Refills: 3
Start: 2020-03-06 | End: 2020-03-18 | Stop reason: DRUGHIGH

## 2020-03-06 NOTE — PROGRESS NOTES
[facility] Otolaryngology  Dr. Mary Ann Dimas. Roman Jett. Ms.Ed        Patient Name:  Lydia Keyes  :  1950     CHIEF C/O:    Chief Complaint   Patient presents with    Thyroid Problem     6 wk f/u total thyroid  patient states no problems at this time       HISTORY OBTAINED FROM:  patient    HISTORY OF PRESENT ILLNESS:       Nichol Saunders is a 79y.o. year old female, here today for follow up of history of thyroidectomy, patient underwent repeat laboratory findings currently no complaints of hoarseness difficulty swallowing incision is healing appropriately no other new complaints of the ear nose and throat.       Past Medical History:   Diagnosis Date    Acute MI ([facility] [address] 08    CAD (coronary artery disease)     Dr. Alice Wei Carotid artery occlusion     Diabetes mellitus ([facility] [address]     Diverticulitis     Hyperlipidemia     Hypertension     Obese     Osteoarthritis     Thyroid nodule      Past Surgical History:   Procedure Laterality Date    CARPAL TUNNEL RELEASE      (L)    CARPAL TUNNEL RELEASE Right 2019    RIGHT CARPAL TUNNEL RELEASE ENDOSCOPIC performed by Favian Marrufo MD at [facility] CATH LAB PROCEDURE      stent placed     FINGER TRIGGER RELEASE Right 2019    RIGHT THUMB TRIGGER RELEASE performed by Favian Marrufo MD at [facility]  2017    LT knee torn meniscus / DR Mary Martin  2017    THYROIDECTOMY N/A 2020    TOTAL THYROIDECTOMY--NERVE INTEGRITY MONITOR performed by Dimitri Turpin DO at [facility]       Current Outpatient Medications:     SYNTHROID 125 MCG tablet, Take 1 tablet by mouth Daily, Disp: 30 tablet, Rfl: 3    atenolol (TENORMIN) 25 MG tablet, TAKE 2 TABLETS EVERY DAY, Disp: 180 tablet, Rfl: 2    SYNTHROID 125 MCG tablet, Take 1 tablet by mouth Daily, Disp: 30 tablet, Rfl: 3    simvastatin (ZOCOR) 80 MG tablet, Take 1 tablet by mouth nightly, Disp: 90 tablet, Rfl: 2    quinapril (ACCUPRIL) 5 MG tablet, with primary care for continued observation. Dr. Titus Nguyễn.  Otolaryngology Facial Plastic Surgery  :  [facility] Otolaryngology/Facial Plastic Surgery Residency  Associate Clinical Professor:  [facility]

## 2020-03-18 ENCOUNTER — HOSPITAL ENCOUNTER (OUTPATIENT)
Age: 70
Discharge: HOME | End: 2020-03-20
Payer: MEDICARE

## 2020-03-18 ENCOUNTER — OFFICE VISIT (OUTPATIENT)
Dept: FAMILY MEDICINE CLINIC | Age: 70
End: 2020-03-18
Payer: MEDICARE

## 2020-03-18 ENCOUNTER — TELEPHONE (OUTPATIENT)
Dept: FAMILY MEDICINE CLINIC | Age: 70
End: 2020-03-18

## 2020-03-18 VITALS
DIASTOLIC BLOOD PRESSURE: 80 MMHG | OXYGEN SATURATION: 97 % | BODY MASS INDEX: 34.47 KG/M2 | SYSTOLIC BLOOD PRESSURE: 136 MMHG | HEART RATE: 67 BPM | WEIGHT: 171 LBS | HEIGHT: 59 IN | RESPIRATION RATE: 20 BRPM | TEMPERATURE: 97.9 F

## 2020-03-18 LAB
ALBUMIN SERPL-MCNC: 4.3 G/DL (ref 3.5–5.2)
ALP BLD-CCNC: 144 U/L (ref 35–104)
ALT SERPL-CCNC: 31 U/L (ref 0–32)
ANION GAP SERPL CALCULATED.3IONS-SCNC: 14 MMOL/L (ref 7–16)
AST SERPL-CCNC: 28 U/L (ref 0–31)
BILIRUB SERPL-MCNC: 0.2 MG/DL (ref 0–1.2)
BILIRUBIN, POC: ABNORMAL
BLOOD URINE, POC: ABNORMAL
BUN BLDV-MCNC: 18 MG/DL (ref 8–23)
CALCIUM SERPL-MCNC: 9.4 MG/DL (ref 8.6–10.2)
CHLORIDE BLD-SCNC: 100 MMOL/L (ref 98–107)
CLARITY, POC: CLEAR
CO2: 25 MMOL/L (ref 22–29)
COLOR, POC: ABNORMAL
CREAT SERPL-MCNC: 0.6 MG/DL (ref 0.5–1)
CREATININE URINE POCT: 100
GFR AFRICAN AMERICAN: >60
GFR NON-AFRICAN AMERICAN: >60 ML/MIN/1.73
GLUCOSE BLD-MCNC: 109 MG/DL (ref 74–99)
GLUCOSE URINE, POC: ABNORMAL
HBA1C MFR BLD: 5.7 %
HCT VFR BLD CALC: 41.4 % (ref 34–48)
HEMOGLOBIN: 12.6 G/DL (ref 11.5–15.5)
KETONES, POC: ABNORMAL
LEUKOCYTE EST, POC: ABNORMAL
MCH RBC QN AUTO: 28.1 PG (ref 26–35)
MCHC RBC AUTO-ENTMCNC: 30.4 % (ref 32–34.5)
MCV RBC AUTO: 92.4 FL (ref 80–99.9)
MICROALBUMIN/CREAT 24H UR: 150 MG/G{CREAT}
MICROALBUMIN/CREAT UR-RTO: ABNORMAL
NITRITE, POC: ABNORMAL
PDW BLD-RTO: 12.2 FL (ref 11.5–15)
PH, POC: 5.5
PLATELET # BLD: 241 E9/L (ref 130–450)
PMV BLD AUTO: 10.1 FL (ref 7–12)
POTASSIUM SERPL-SCNC: 4.9 MMOL/L (ref 3.5–5)
PROTEIN, POC: ABNORMAL
RBC # BLD: 4.48 E12/L (ref 3.5–5.5)
SODIUM BLD-SCNC: 139 MMOL/L (ref 132–146)
SPECIFIC GRAVITY, POC: 1.02
TOTAL PROTEIN: 7.2 G/DL (ref 6.4–8.3)
UROBILINOGEN, POC: 0.2
WBC # BLD: 8.4 E9/L (ref 4.5–11.5)

## 2020-03-18 PROCEDURE — 99213 OFFICE O/P EST LOW 20 MIN: CPT | Performed by: NURSE PRACTITIONER

## 2020-03-18 PROCEDURE — G8417 CALC BMI ABV UP PARAM F/U: HCPCS | Performed by: NURSE PRACTITIONER

## 2020-03-18 PROCEDURE — 85027 COMPLETE CBC AUTOMATED: CPT

## 2020-03-18 PROCEDURE — 81002 URINALYSIS NONAUTO W/O SCOPE: CPT | Performed by: NURSE PRACTITIONER

## 2020-03-18 PROCEDURE — 3017F COLORECTAL CA SCREEN DOC REV: CPT | Performed by: NURSE PRACTITIONER

## 2020-03-18 PROCEDURE — 87088 URINE BACTERIA CULTURE: CPT

## 2020-03-18 PROCEDURE — 2022F DILAT RTA XM EVC RTNOPTHY: CPT | Performed by: NURSE PRACTITIONER

## 2020-03-18 PROCEDURE — 83036 HEMOGLOBIN GLYCOSYLATED A1C: CPT | Performed by: NURSE PRACTITIONER

## 2020-03-18 PROCEDURE — 3044F HG A1C LEVEL LT 7.0%: CPT | Performed by: NURSE PRACTITIONER

## 2020-03-18 PROCEDURE — 82044 UR ALBUMIN SEMIQUANTITATIVE: CPT | Performed by: NURSE PRACTITIONER

## 2020-03-18 PROCEDURE — 87186 SC STD MICRODIL/AGAR DIL: CPT

## 2020-03-18 PROCEDURE — G8427 DOCREV CUR MEDS BY ELIG CLIN: HCPCS | Performed by: NURSE PRACTITIONER

## 2020-03-18 PROCEDURE — 1090F PRES/ABSN URINE INCON ASSESS: CPT | Performed by: NURSE PRACTITIONER

## 2020-03-18 PROCEDURE — 4040F PNEUMOC VAC/ADMIN/RCVD: CPT | Performed by: NURSE PRACTITIONER

## 2020-03-18 PROCEDURE — G8400 PT W/DXA NO RESULTS DOC: HCPCS | Performed by: NURSE PRACTITIONER

## 2020-03-18 PROCEDURE — G8482 FLU IMMUNIZE ORDER/ADMIN: HCPCS | Performed by: NURSE PRACTITIONER

## 2020-03-18 PROCEDURE — 87147 CULTURE TYPE IMMUNOLOGIC: CPT

## 2020-03-18 PROCEDURE — 1036F TOBACCO NON-USER: CPT | Performed by: NURSE PRACTITIONER

## 2020-03-18 PROCEDURE — 1123F ACP DISCUSS/DSCN MKR DOCD: CPT | Performed by: NURSE PRACTITIONER

## 2020-03-18 PROCEDURE — 80053 COMPREHEN METABOLIC PANEL: CPT

## 2020-03-18 RX ORDER — NITROFURANTOIN 25; 75 MG/1; MG/1
100 CAPSULE ORAL 2 TIMES DAILY
Qty: 20 CAPSULE | Refills: 0 | Status: SHIPPED
Start: 2020-03-18 | End: 2020-08-12 | Stop reason: SDUPTHER

## 2020-03-18 RX ORDER — TAMSULOSIN HYDROCHLORIDE 0.4 MG/1
0.4 CAPSULE ORAL DAILY
Qty: 7 CAPSULE | Refills: 0 | Status: SHIPPED
Start: 2020-03-18 | End: 2021-04-19 | Stop reason: ALTCHOICE

## 2020-03-18 RX ORDER — LEVOTHYROXINE SODIUM 0.1 MG/1
100 TABLET ORAL DAILY
Qty: 90 TABLET | Refills: 0 | Status: SHIPPED
Start: 2020-03-18 | End: 2020-06-12 | Stop reason: SDUPTHER

## 2020-03-18 ASSESSMENT — ENCOUNTER SYMPTOMS
SHORTNESS OF BREATH: 0
RHINORRHEA: 1
COUGH: 0
COUGH: 0
SHORTNESS OF BREATH: 0
EYE PAIN: 0
CHEST TIGHTNESS: 0
WHEEZING: 0
VOMITING: 0
VOMITING: 0
CONSTIPATION: 0
SINUS PAIN: 1
SINUS PAIN: 0
DIARRHEA: 0
COLOR CHANGE: 0
NAUSEA: 0

## 2020-03-18 NOTE — PROGRESS NOTES
HPI:  Patient comes in today for   Chief Complaint   Patient presents with    Diabetes     3 month f/u    Urinary Frequency     with burning began this morning   . Started with burning, denies discharge, just hurts when she urinates. Has continued to take her diabetes medication & watches her portions. Does not have a history of kidney stones. Prior to Visit Medications    Medication Sig Taking?  Authorizing Provider   levothyroxine (SYNTHROID) 100 MCG tablet Take 1 tablet by mouth daily Yes Afsaneh Plate, APRN - CNP   nitrofurantoin, macrocrystal-monohydrate, (MACROBID) 100 MG capsule Take 1 capsule by mouth 2 times daily for 10 days Yes Afsaneh Plate, APRN - CNP   tamsulosin (FLOMAX) 0.4 MG capsule Take 1 capsule by mouth daily for 7 days , take 30 minutes after a meal. Yes Afsaneh Plate, APRN - CNP   atenolol (TENORMIN) 25 MG tablet TAKE 2 TABLETS EVERY DAY Yes Afsaneh Plate, APRN - CNP   simvastatin (ZOCOR) 80 MG tablet Take 1 tablet by mouth nightly Yes Afsaneh Plate, APRN - CNP   quinapril (ACCUPRIL) 5 MG tablet Take 1 tablet by mouth nightly Yes Afsaneh Plate, APRN - CNP   famotidine (PEPCID) 20 MG tablet Take 1 tablet by mouth 2 times daily Yes Afsaneh Plate, APRN - CNP   acetaminophen (TYLENOL) 500 MG tablet Take 1 tablet by mouth 4 times daily as needed for Pain Yes Afsaneh Plate, APRN - CNP   venlafaxine (EFFEXOR XR) 150 MG extended release capsule TAKE 1 CAPSULE EVERY DAY Yes Afsaneh Plate, APRN - CNP   aspirin 81 MG EC tablet Take 1 tablet by mouth daily LD 12-4-19  Patient taking differently: Take 81 mg by mouth daily  Yes Afsaneh Plate, APRN - CNP   Liraglutide (VICTOZA) 18 MG/3ML SOPN SC injection INJECT 1.2 MG SUBCUTANEOUSLY DAILY Yes Afsaneh Plate, APRN - CNP   Lancets MISC PT TESTS TWICE DAILY Yes Afsaneh Plate, APRN - CNP   blood glucose monitor strips PT TESTS TWICE DAILY Yes Afsaneh Plate, APRN - CNP   Insulin Pen Needle (PEN NEEDLES) 31G X 6 MM MISC 1 each by Does not apply route daily Yes ROXY Reich CNP   Alcohol Swabs (B-D SINGLE USE SWABS REGULAR) PADS Use 3 alcohol pads PRN when testing sugar. Yes ROXY Reich CNP   diclofenac sodium 1 % GEL Apply 2 g topically 4 times daily Yes ROXY Reich CNP   Blood Glucose Monitoring Suppl (ACCU-CHEK STEVEN PLUS) w/Device KIT CHECK BLOOD SUGAR TWICE DAILY Yes ROXY Yepez CNP     No Known Allergies    Review of Systems    Review of Systems   Constitutional: Negative for activity change, appetite change, chills and fever. HENT: Negative for congestion, ear pain, sinus pain and sneezing. Eyes: Negative for pain and visual disturbance. Respiratory: Negative for cough, chest tightness, shortness of breath and wheezing. Cardiovascular: Negative for chest pain, palpitations and leg swelling. Gastrointestinal: Negative for constipation, diarrhea, nausea and vomiting. Endocrine: Negative for cold intolerance, heat intolerance and polyuria. Genitourinary: Positive for dysuria, frequency, hematuria and urgency. Negative for decreased urine volume and difficulty urinating. Musculoskeletal: Negative for arthralgias and myalgias. Skin: Negative for color change, rash and wound. Neurological: Negative for dizziness, light-headedness and headaches. Hematological: Negative for adenopathy. Does not bruise/bleed easily. Psychiatric/Behavioral: Negative for agitation, decreased concentration, sleep disturbance and suicidal ideas. The patient is not nervous/anxious. VS:  /80 (Site: Left Upper Arm, Position: Sitting, Cuff Size: Medium Adult)   Pulse 67   Temp 97.9 °F (36.6 °C) (Oral)   Resp 20   Ht 4' 11\" (1.499 m)   Wt 171 lb (77.6 kg)   SpO2 97%   Breastfeeding No   BMI 34.54 kg/m²     Physical Exam    Physical Exam  Vitals signs and nursing note reviewed. Constitutional:       General: She is not in acute distress. Appearance: Normal appearance.  She is well-developed and normal weight. She is not ill-appearing, toxic-appearing or diaphoretic. HENT:      Head: Normocephalic and atraumatic. Right Ear: Tympanic membrane, ear canal and external ear normal.      Left Ear: Tympanic membrane, ear canal and external ear normal.      Nose: Nose normal.      Mouth/Throat:      Mouth: Mucous membranes are moist.      Pharynx: Oropharynx is clear. No oropharyngeal exudate. Eyes:      Extraocular Movements: Extraocular movements intact. Conjunctiva/sclera: Conjunctivae normal.      Pupils: Pupils are equal, round, and reactive to light. Neck:      Musculoskeletal: Normal range of motion and neck supple. Thyroid: No thyromegaly. Vascular: No carotid bruit. Cardiovascular:      Rate and Rhythm: Normal rate and regular rhythm. Pulses: Normal pulses. Heart sounds: Normal heart sounds. No murmur. Pulmonary:      Effort: Pulmonary effort is normal. No respiratory distress. Breath sounds: Normal breath sounds. No wheezing. Abdominal:      General: Bowel sounds are normal.      Palpations: Abdomen is soft. Tenderness: There is no abdominal tenderness. There is no guarding or rebound. Genitourinary:     Vagina: Normal.      Comments: Deferred. Musculoskeletal: Normal range of motion. General: No swelling, tenderness or deformity. Lymphadenopathy:      Cervical: No cervical adenopathy. Skin:     General: Skin is warm and dry. Capillary Refill: Capillary refill takes less than 2 seconds. Findings: No bruising, erythema or rash. Neurological:      General: No focal deficit present. Mental Status: She is alert and oriented to person, place, and time. Mental status is at baseline. Cranial Nerves: No cranial nerve deficit. Sensory: No sensory deficit. Motor: No weakness.       Coordination: Coordination normal.      Gait: Gait normal.   Psychiatric:         Mood and Affect: Mood normal.         Behavior: Behavior normal.         Thought Content: Thought content normal.         Judgment: Judgment normal.     Health Maintenance    BP Readings from Last 1 Encounters:   03/18/20 136/80    Recheck if >140/90  Hemoglobin A1C (%)   Date Value   03/18/2020 5.7     Plan:    Quinton Jimenez was seen today for diabetes and urinary frequency. Diagnoses and all orders for this visit:    Type 2 diabetes mellitus without complication, without long-term current use of insulin (HCC)  -     POCT glycosylated hemoglobin (Hb A1C)  -     POCT Microalbumin  - The current medical regimen is effective;  continue present plan and medications. Dysuria  -     POCT Urinalysis no Micro  -     CT ABDOMEN PELVIS W WO CONTRAST Additional Contrast? Radiologist Recommendation; Future  - Anusha Maharaj will seek the CT if her symptoms progress over the weekend    Urinary tract infection with hematuria, site unspecified  -     nitrofurantoin, macrocrystal-monohydrate, (MACROBID) 100 MG capsule; Take 1 capsule by mouth 2 times daily for 10 days  -     Comprehensive Metabolic Panel; Future  -     CBC; Future  -     Culture, Urine; Future  -     tamsulosin (FLOMAX) 0.4 MG capsule; Take 1 capsule by mouth daily for 7 days , take 30 minutes after a meal.    Thyroid nodule  -     levothyroxine (SYNTHROID) 100 MCG tablet; Take 1 tablet by mouth daily  - The current medical regimen is effective;  continue present plan and medications. Other microscopic hematuria  -     CT ABDOMEN PELVIS W WO CONTRAST Additional Contrast? Radiologist Recommendation; Future  -     tamsulosin (FLOMAX) 0.4 MG capsule; Take 1 capsule by mouth daily for 7 days , take 30 minutes after a meal.    At high risk for falls  - On the basis of positive falls risk screening, assessment and plan is as follows: in-office gait and balance testing performed using The 30 Second Chair Stand Test was negative for increased falls risk- no further intervention is currently indicated.     Major depressive

## 2020-03-19 PROBLEM — F32.1 MAJOR DEPRESSIVE DISORDER, SINGLE EPISODE, MODERATE (HCC): Status: ACTIVE | Noted: 2020-03-19

## 2020-03-20 LAB
ORGANISM: ABNORMAL
URINE CULTURE, ROUTINE: ABNORMAL

## 2020-05-14 RX ORDER — PEN NEEDLE, DIABETIC 31 G X1/4"
NEEDLE, DISPOSABLE MISCELLANEOUS
Qty: 100 EACH | Refills: 5 | Status: SHIPPED
Start: 2020-05-14 | End: 2020-06-12 | Stop reason: SDUPTHER

## 2020-05-21 ENCOUNTER — PATIENT MESSAGE (OUTPATIENT)
Dept: FAMILY MEDICINE CLINIC | Age: 70
End: 2020-05-21

## 2020-06-01 ENCOUNTER — HOSPITAL ENCOUNTER (OUTPATIENT)
Age: 70
Discharge: HOME | End: 2020-06-03
Payer: MEDICARE

## 2020-06-01 ENCOUNTER — NURSE ONLY (OUTPATIENT)
Dept: FAMILY MEDICINE CLINIC | Age: 70
End: 2020-06-01

## 2020-06-01 LAB
T3 FREE: 2.8 PG/ML (ref 2–4.4)
T4 FREE: 1.23 NG/DL (ref 0.93–1.7)
TSH SERPL DL<=0.05 MIU/L-ACNC: 1.11 UIU/ML (ref 0.27–4.2)

## 2020-06-01 PROCEDURE — 84481 FREE ASSAY (FT-3): CPT

## 2020-06-01 PROCEDURE — 84443 ASSAY THYROID STIM HORMONE: CPT

## 2020-06-01 PROCEDURE — 84439 ASSAY OF FREE THYROXINE: CPT

## 2020-06-12 ENCOUNTER — VIRTUAL VISIT (OUTPATIENT)
Dept: FAMILY MEDICINE CLINIC | Age: 70
End: 2020-06-12
Payer: MEDICARE

## 2020-06-12 PROCEDURE — 3044F HG A1C LEVEL LT 7.0%: CPT | Performed by: NURSE PRACTITIONER

## 2020-06-12 PROCEDURE — 1123F ACP DISCUSS/DSCN MKR DOCD: CPT | Performed by: NURSE PRACTITIONER

## 2020-06-12 PROCEDURE — 3017F COLORECTAL CA SCREEN DOC REV: CPT | Performed by: NURSE PRACTITIONER

## 2020-06-12 PROCEDURE — 99213 OFFICE O/P EST LOW 20 MIN: CPT | Performed by: NURSE PRACTITIONER

## 2020-06-12 PROCEDURE — G8427 DOCREV CUR MEDS BY ELIG CLIN: HCPCS | Performed by: NURSE PRACTITIONER

## 2020-06-12 PROCEDURE — 4040F PNEUMOC VAC/ADMIN/RCVD: CPT | Performed by: NURSE PRACTITIONER

## 2020-06-12 PROCEDURE — 2022F DILAT RTA XM EVC RTNOPTHY: CPT | Performed by: NURSE PRACTITIONER

## 2020-06-12 PROCEDURE — G8400 PT W/DXA NO RESULTS DOC: HCPCS | Performed by: NURSE PRACTITIONER

## 2020-06-12 PROCEDURE — 1090F PRES/ABSN URINE INCON ASSESS: CPT | Performed by: NURSE PRACTITIONER

## 2020-06-12 RX ORDER — QUINAPRIL 5 1/1
5 TABLET ORAL NIGHTLY
Qty: 90 TABLET | Refills: 2 | Status: SHIPPED
Start: 2020-06-12 | End: 2021-02-23

## 2020-06-12 RX ORDER — TAMSULOSIN HYDROCHLORIDE 0.4 MG/1
0.4 CAPSULE ORAL DAILY
Qty: 7 CAPSULE | Refills: 0 | Status: CANCELLED | OUTPATIENT
Start: 2020-06-12 | End: 2020-06-19

## 2020-06-12 RX ORDER — SIMVASTATIN 80 MG
80 TABLET ORAL NIGHTLY
Qty: 90 TABLET | Refills: 2 | Status: SHIPPED
Start: 2020-06-12 | End: 2021-02-01

## 2020-06-12 RX ORDER — LANCETS 30 GAUGE
EACH MISCELLANEOUS
Qty: 200 EACH | Refills: 2 | Status: SHIPPED
Start: 2020-06-12 | End: 2021-02-23

## 2020-06-12 RX ORDER — LIRAGLUTIDE 6 MG/ML
INJECTION SUBCUTANEOUS
Qty: 18 ML | Refills: 5 | Status: SHIPPED
Start: 2020-06-12 | End: 2020-07-17

## 2020-06-12 RX ORDER — VENLAFAXINE HYDROCHLORIDE 150 MG/1
CAPSULE, EXTENDED RELEASE ORAL
Qty: 90 CAPSULE | Refills: 3 | Status: SHIPPED
Start: 2020-06-12 | End: 2021-04-10

## 2020-06-12 RX ORDER — FAMOTIDINE 20 MG/1
20 TABLET, FILM COATED ORAL 2 TIMES DAILY
Qty: 180 TABLET | Refills: 2 | Status: SHIPPED
Start: 2020-06-12 | End: 2021-01-25

## 2020-06-12 RX ORDER — ACETAMINOPHEN 500 MG
500 TABLET ORAL 4 TIMES DAILY PRN
Qty: 360 TABLET | Refills: 1 | Status: SHIPPED | OUTPATIENT
Start: 2020-06-12

## 2020-06-12 RX ORDER — ATENOLOL 25 MG/1
25 TABLET ORAL DAILY
Qty: 90 TABLET | Refills: 3 | Status: SHIPPED
Start: 2020-06-12 | End: 2021-04-10

## 2020-06-12 RX ORDER — GLUCOSAMINE HCL/CHONDROITIN SU 500-400 MG
CAPSULE ORAL
Qty: 200 STRIP | Refills: 5 | Status: SHIPPED
Start: 2020-06-12 | End: 2021-07-22

## 2020-06-12 RX ORDER — ISOPROPYL ALCOHOL 0.75 G/1
SWAB TOPICAL
Qty: 100 EACH | Refills: 5 | Status: SHIPPED
Start: 2020-06-12 | End: 2020-10-24

## 2020-06-12 RX ORDER — LEVOTHYROXINE SODIUM 0.1 MG/1
100 TABLET ORAL DAILY
Qty: 90 TABLET | Refills: 1 | Status: SHIPPED
Start: 2020-06-12 | End: 2020-06-16 | Stop reason: SDUPTHER

## 2020-06-12 RX ORDER — PEN NEEDLE, DIABETIC 31 G X1/4"
1 NEEDLE, DISPOSABLE MISCELLANEOUS 3 TIMES DAILY
Qty: 100 EACH | Refills: 5 | Status: SHIPPED
Start: 2020-06-12 | End: 2021-11-03 | Stop reason: SDUPTHER

## 2020-06-12 ASSESSMENT — ENCOUNTER SYMPTOMS
SHORTNESS OF BREATH: 0
COUGH: 0
DIARRHEA: 0
CONSTIPATION: 0
WHEEZING: 0
NAUSEA: 0
SINUS PAIN: 0
CHEST TIGHTNESS: 0
COLOR CHANGE: 0
VOMITING: 0
EYE PAIN: 0

## 2020-06-12 NOTE — PROGRESS NOTES
TeleMedicine Patient Consent    This visit was performed as a virtual video visit using a synchronous, two-way, audio-video telehealth technology platform. Patient identification was verified at the start of the visit, including the patient's telephone number and physical location. I discussed with the patient the nature of our telehealth visits, that:     1. Due to the nature of an audio- video modality, the only components of a physical exam that could be done are the elements supported by direct observation. 2. I would evaluate the patient and recommend diagnostics and treatments based on my assessment. 3. If it was felt that the patient should be evaluated in clinic or an emergency room setting, then they would be directed there. 4. Our sessions are not being recorded and that personal health information is protected. 5. Our team would provide follow up care in person if/when the patient needs it. Patient does agree to proceed with telemedicine consultation. Patient's location: home address in [facility]  Physician  location other other people involved in call:     HPI:  Patient comes in today for   Chief Complaint   Patient presents with    Diabetes    Medication Refill   . Melissa Paz has been watching her diet & trying to stay active at home during the [place] crisis, has no complaints today, just medication refills. She recently obtained the lab work as we requested. Prior to Visit Medications    Medication Sig Taking?  Authorizing Provider   levothyroxine (SYNTHROID) 100 MCG tablet Take 1 tablet by mouth daily Yes ROXY Grady CNP   quinapril (ACCUPRIL) 5 MG tablet Take 1 tablet by mouth nightly Yes ROXY Grady CNP   Liraglutide (VICTOZA) 18 MG/3ML SOPN SC injection INJECT 1.2 MG SUBCUTANEOUSLY DAILY Yes ROXY Grady CNP   simvastatin (ZOCOR) 80 MG tablet Take 1 tablet by mouth nightly Yes ROXY Grady CNP   venlafaxine (EFFEXOR XR) 150 MG extended release urinating. Musculoskeletal: Negative for arthralgias and myalgias. Skin: Negative for color change and rash. Allergic/Immunologic: Negative for environmental allergies. Neurological: Negative for dizziness, light-headedness and headaches. Hematological: Negative for adenopathy. Does not bruise/bleed easily. Psychiatric/Behavioral: Negative for agitation, decreased concentration, sleep disturbance and suicidal ideas. The patient is not nervous/anxious. VS:  There were no vitals taken for this visit. Pt is unable to provide vitals at today's appointment. Physical Exam    Physical Exam    Health Maintenance    BP Readings from Last 1 Encounters:   03/18/20 136/80    Recheck if >140/90  Hemoglobin A1C (%)   Date Value   03/18/2020 5.7     Plan:    Shayna Rivera was seen today for diabetes and medication refill. Diagnoses and all orders for this visit:    Gastroesophageal reflux disease without esophagitis  -     famotidine (PEPCID) 20 MG tablet; Take 1 tablet by mouth 2 times daily  - The patient is asked to make an attempt to improve diet and exercise patterns to aid in medical management of this problem. Anxiety and depression  -     venlafaxine (EFFEXOR XR) 150 MG extended release capsule; TAKE 1 CAPSULE EVERY DAY  - The current medical regimen is effective;  continue present plan and medications. Thyroid nodule  -     levothyroxine (SYNTHROID) 100 MCG tablet; Take 1 tablet by mouth daily  - Recent labs indicated clinical response, continue at 100 mcg dosage unless symptoms progress    Essential hypertension  -     quinapril (ACCUPRIL) 5 MG tablet; Take 1 tablet by mouth nightly  -     atenolol (TENORMIN) 25 MG tablet; Take 1 tablet by mouth daily  - The current medical regimen is effective;  continue present plan and medications.     Type 2 diabetes mellitus without complication, without long-term current use of insulin (HCC)  -     Liraglutide (VICTOZA) 18 MG/3ML SOPN SC injection; INJECT

## 2020-06-12 NOTE — PATIENT INSTRUCTIONS
Patient Education        Strain or Sprain: Care Instructions  Your Care Instructions     A strain happens when you overstretch, or pull, a muscle. A sprain occurs when you stretch or tear a ligament, the tough tissue that connects one bone to another. These problems can happen when you exercise or lift something or when you are in an accident. Rest and other home care can help strains and sprains heal.  The doctor has checked you carefully, but problems can develop later. If you notice any problems or new symptoms,  get medical treatment right away. Follow-up care is a key part of your treatment and safety. Be sure to make and go to all appointments, and call your doctor if you are having problems. It's also a good idea to know your test results and keep a list of the medicines you take. How can you care for yourself at home? · If your doctor gave you a sling, splint, brace, or immobilizer, use it exactly as directed. · Rest the strained or sprained area, and follow your doctor's advice about when you can be active again. · Put ice or a cold pack on the sore area for 10 to 20 minutes at a time to stop swelling. Try this every 1 to 2 hours for 3 days (when you are awake) or until the swelling goes down. Put a thin cloth between the ice pack and your skin. Keep your splint or brace dry. · Prop up a sore arm or leg on a pillow when you ice it or anytime you sit or lie down. Try to keep it higher than the level of your heart. This will help reduce swelling. · Take pain medicines exactly as directed. ? If the doctor gave you a prescription medicine for pain, take it as prescribed. ? If you are not taking a prescription pain medicine, ask your doctor if you can take an over-the-counter medicine. · Do exercises as directed by your doctor or physical therapist.  · Return to your usual level of activity slowly. · Do not do anything that makes the pain worse. When should you call for help?    Call your doctor now

## 2020-06-16 ENCOUNTER — PATIENT MESSAGE (OUTPATIENT)
Dept: FAMILY MEDICINE CLINIC | Age: 70
End: 2020-06-16

## 2020-06-16 RX ORDER — LEVOTHYROXINE SODIUM 0.1 MG/1
100 TABLET ORAL DAILY
Qty: 90 TABLET | Refills: 1 | Status: SHIPPED
Start: 2020-06-16 | End: 2021-01-05

## 2020-06-16 NOTE — TELEPHONE ENCOUNTER
Can you please call Marci and find out what she is asking about? Or we can do a virtual, but my schedule is crazy today. She may have a virtual tomorrow if I can't handle this outside of your call to her.

## 2020-06-16 NOTE — TELEPHONE ENCOUNTER
Spoke with patient, she needs a refill on Synthroid sent to [address] [place] [address] (pended). She said you sent it to [facility] but they did not receive it. Also, said she needs an A1C after 06/18/20. Scheduled her lab for POCT on 06/19/20.

## 2020-06-22 ENCOUNTER — NURSE ONLY (OUTPATIENT)
Dept: FAMILY MEDICINE CLINIC | Age: 70
End: 2020-06-22
Payer: MEDICARE

## 2020-06-22 LAB — HBA1C MFR BLD: 6.4 %

## 2020-06-22 PROCEDURE — 83036 HEMOGLOBIN GLYCOSYLATED A1C: CPT | Performed by: NURSE PRACTITIONER

## 2020-06-22 PROCEDURE — 99211 OFF/OP EST MAY X REQ PHY/QHP: CPT | Performed by: NURSE PRACTITIONER

## 2020-07-16 ENCOUNTER — PATIENT MESSAGE (OUTPATIENT)
Dept: FAMILY MEDICINE CLINIC | Age: 70
End: 2020-07-16

## 2020-07-17 RX ORDER — LIRAGLUTIDE 6 MG/ML
1.8 INJECTION SUBCUTANEOUS DAILY
Qty: 3 PEN | Refills: 3 | Status: SHIPPED
Start: 2020-07-17 | End: 2020-10-21

## 2020-07-17 RX ORDER — LIRAGLUTIDE 6 MG/ML
1.8 INJECTION SUBCUTANEOUS DAILY
Qty: 3 PEN | Refills: 3 | Status: SHIPPED
Start: 2020-07-17 | End: 2020-07-17

## 2020-08-12 ENCOUNTER — TELEPHONE (OUTPATIENT)
Dept: PRIMARY CARE CLINIC | Age: 70
End: 2020-08-12

## 2020-08-12 ENCOUNTER — PATIENT MESSAGE (OUTPATIENT)
Dept: FAMILY MEDICINE CLINIC | Age: 70
End: 2020-08-12

## 2020-08-12 RX ORDER — NITROFURANTOIN 25; 75 MG/1; MG/1
100 CAPSULE ORAL 2 TIMES DAILY
Qty: 10 CAPSULE | Refills: 0 | Status: SHIPPED | OUTPATIENT
Start: 2020-08-12 | End: 2020-08-17

## 2020-08-12 RX ORDER — FLUCONAZOLE 150 MG/1
150 TABLET ORAL
Qty: 2 TABLET | Refills: 0 | Status: SHIPPED | OUTPATIENT
Start: 2020-08-12 | End: 2020-08-18

## 2020-10-21 RX ORDER — LIRAGLUTIDE 6 MG/ML
INJECTION SUBCUTANEOUS
Qty: 27 ML | Refills: 5 | Status: SHIPPED
Start: 2020-10-21 | End: 2021-08-25

## 2020-10-24 RX ORDER — ISOPROPYL ALCOHOL 0.75 G/1
SWAB TOPICAL
Qty: 1 EACH | Refills: 5 | Status: SHIPPED
Start: 2020-10-24 | End: 2021-04-14

## 2020-11-30 ENCOUNTER — PATIENT MESSAGE (OUTPATIENT)
Dept: FAMILY MEDICINE CLINIC | Age: 70
End: 2020-11-30

## 2020-11-30 NOTE — TELEPHONE ENCOUNTER
From: Tiffanie Watters  To: ROXY Cee - CNP  Sent: 11/30/2020 1:36 PM EST  Subject: Prescription Question    A while ago Claudette Ackerman ordered me some meds for an itch problem. Well it's back and meds gone If I give you the name could you call me in some.  It's Clotrimazole and Betamethasone DipropionateCream. I'm very careful how I use it but I would appreciate it so much if you could give me Rx for more Thank you from your favorite pain in the %*$#%

## 2020-12-01 RX ORDER — CLOTRIMAZOLE AND BETAMETHASONE DIPROPIONATE 10; .64 MG/G; MG/G
CREAM TOPICAL
Qty: 45 G | Refills: 1 | Status: SHIPPED
Start: 2020-12-01 | End: 2021-06-01 | Stop reason: ALTCHOICE

## 2021-01-04 DIAGNOSIS — E04.1 THYROID NODULE: ICD-10-CM

## 2021-01-05 RX ORDER — LEVOTHYROXINE SODIUM 0.1 MG/1
TABLET ORAL
Qty: 90 TABLET | Refills: 1 | Status: SHIPPED
Start: 2021-01-05 | End: 2021-05-18

## 2021-01-05 NOTE — TELEPHONE ENCOUNTER
Last Appointment:  6/12/2020  Future Appointments   Date Time Provider Radha Johnsoni   4/20/2021  1:00 PM [facility] V[facility] [place]   4/20/2021  1:30 PM John Napoles MD VASC/MED [facility]

## 2021-01-20 ENCOUNTER — NURSE ONLY (OUTPATIENT)
Dept: FAMILY MEDICINE CLINIC | Age: 71
End: 2021-01-20

## 2021-01-20 DIAGNOSIS — Z79.4 TYPE 2 DIABETES MELLITUS WITHOUT COMPLICATION, WITH LONG-TERM CURRENT USE OF INSULIN (HCC): ICD-10-CM

## 2021-01-20 DIAGNOSIS — E11.9 TYPE 2 DIABETES MELLITUS WITHOUT COMPLICATION, WITHOUT LONG-TERM CURRENT USE OF INSULIN (HCC): ICD-10-CM

## 2021-01-20 DIAGNOSIS — I10 ESSENTIAL HYPERTENSION: Primary | ICD-10-CM

## 2021-01-20 DIAGNOSIS — E04.1 THYROID NODULE: ICD-10-CM

## 2021-01-20 DIAGNOSIS — F41.9 ANXIETY AND DEPRESSION: ICD-10-CM

## 2021-01-20 DIAGNOSIS — E78.5 HYPERLIPIDEMIA, UNSPECIFIED HYPERLIPIDEMIA TYPE: ICD-10-CM

## 2021-01-20 DIAGNOSIS — E11.9 TYPE 2 DIABETES MELLITUS WITHOUT COMPLICATION, WITH LONG-TERM CURRENT USE OF INSULIN (HCC): ICD-10-CM

## 2021-01-20 DIAGNOSIS — F32.A ANXIETY AND DEPRESSION: ICD-10-CM

## 2021-01-20 LAB
CHOLESTEROL, TOTAL: 170 MG/DL (ref 0–199)
HBA1C MFR BLD: 6.2 % (ref 4–5.6)
HDLC SERPL-MCNC: 40 MG/DL
LDL CHOLESTEROL CALCULATED: 90 MG/DL (ref 0–99)
T4 FREE: 1.38 NG/DL (ref 0.93–1.7)
TRIGL SERPL-MCNC: 200 MG/DL (ref 0–149)
TSH SERPL DL<=0.05 MIU/L-ACNC: 1.28 UIU/ML (ref 0.27–4.2)
VLDLC SERPL CALC-MCNC: 40 MG/DL

## 2021-02-15 DIAGNOSIS — K21.9 GASTROESOPHAGEAL REFLUX DISEASE WITHOUT ESOPHAGITIS: ICD-10-CM

## 2021-02-16 RX ORDER — PANTOPRAZOLE SODIUM 40 MG/1
40 TABLET, DELAYED RELEASE ORAL
Qty: 30 TABLET | Refills: 5 | Status: SHIPPED
Start: 2021-02-16 | End: 2021-04-19 | Stop reason: DRUGHIGH

## 2021-02-16 NOTE — TELEPHONE ENCOUNTER
Last Appointment:  6/12/2020  Future Appointments   Date Time Provider Radha Johnsoni   4/20/2021  1:00 PM [facility] VAS [facility] [place]   4/20/2021  1:30 PM John Napoles MD VASC/MED [facility]

## 2021-02-22 DIAGNOSIS — I10 ESSENTIAL HYPERTENSION: ICD-10-CM

## 2021-02-22 DIAGNOSIS — E11.9 TYPE 2 DIABETES MELLITUS WITHOUT COMPLICATION, WITHOUT LONG-TERM CURRENT USE OF INSULIN (HCC): ICD-10-CM

## 2021-02-23 RX ORDER — LANCETS
EACH MISCELLANEOUS
Qty: 200 EACH | Refills: 2 | Status: SHIPPED
Start: 2021-02-23 | End: 2022-09-22 | Stop reason: SDUPTHER

## 2021-02-23 RX ORDER — QUINAPRIL 5 1/1
TABLET ORAL
Qty: 90 TABLET | Refills: 2 | Status: SHIPPED
Start: 2021-02-23 | End: 2021-10-19

## 2021-02-23 NOTE — TELEPHONE ENCOUNTER
Last Appointment:  6/12/2020  Future Appointments   Date Time Provider Radha Johnsoni   4/20/2021  1:00 PM [facility] VAS [facility] [place]   4/20/2021  1:30 PM Martha Delgado MD VASC/[facility]

## 2021-04-05 DIAGNOSIS — B37.9 YEAST INFECTION: Primary | ICD-10-CM

## 2021-04-05 RX ORDER — FLUCONAZOLE 150 MG/1
150 TABLET ORAL
Qty: 2 TABLET | Refills: 0 | Status: SHIPPED
Start: 2021-04-05 | End: 2021-04-29 | Stop reason: DRUGHIGH

## 2021-04-09 DIAGNOSIS — F41.9 ANXIETY AND DEPRESSION: ICD-10-CM

## 2021-04-09 DIAGNOSIS — I10 ESSENTIAL HYPERTENSION: ICD-10-CM

## 2021-04-09 DIAGNOSIS — F32.A ANXIETY AND DEPRESSION: ICD-10-CM

## 2021-04-10 RX ORDER — ATENOLOL 25 MG/1
TABLET ORAL
Qty: 90 TABLET | Refills: 3 | Status: SHIPPED
Start: 2021-04-10 | End: 2021-07-05

## 2021-04-10 RX ORDER — VENLAFAXINE HYDROCHLORIDE 150 MG/1
CAPSULE, EXTENDED RELEASE ORAL
Qty: 90 CAPSULE | Refills: 3 | Status: SHIPPED
Start: 2021-04-10 | End: 2022-05-24 | Stop reason: SDUPTHER

## 2021-04-14 DIAGNOSIS — E11.9 TYPE 2 DIABETES MELLITUS WITHOUT COMPLICATION, WITHOUT LONG-TERM CURRENT USE OF INSULIN (HCC): ICD-10-CM

## 2021-04-14 RX ORDER — ISOPROPYL ALCOHOL 0.75 G/1
SWAB TOPICAL
Qty: 100 EACH | Refills: 5 | Status: SHIPPED
Start: 2021-04-14 | End: 2021-09-20

## 2021-04-19 ENCOUNTER — OFFICE VISIT (OUTPATIENT)
Dept: FAMILY MEDICINE CLINIC | Age: 71
End: 2021-04-19
Payer: MEDICARE

## 2021-04-19 VITALS
WEIGHT: 175 LBS | OXYGEN SATURATION: 96 % | TEMPERATURE: 98 F | RESPIRATION RATE: 20 BRPM | HEIGHT: 59 IN | HEART RATE: 74 BPM | BODY MASS INDEX: 35.28 KG/M2 | DIASTOLIC BLOOD PRESSURE: 74 MMHG | SYSTOLIC BLOOD PRESSURE: 134 MMHG

## 2021-04-19 DIAGNOSIS — Z87.891 FORMER SMOKER: ICD-10-CM

## 2021-04-19 DIAGNOSIS — Z87.891 PERSONAL HISTORY OF TOBACCO USE: ICD-10-CM

## 2021-04-19 DIAGNOSIS — Z78.0 POST-MENOPAUSAL: ICD-10-CM

## 2021-04-19 DIAGNOSIS — E11.9 TYPE 2 DIABETES MELLITUS WITHOUT COMPLICATION, WITHOUT LONG-TERM CURRENT USE OF INSULIN (HCC): ICD-10-CM

## 2021-04-19 DIAGNOSIS — F32.1 MAJOR DEPRESSIVE DISORDER, SINGLE EPISODE, MODERATE (HCC): ICD-10-CM

## 2021-04-19 DIAGNOSIS — E55.9 VITAMIN D DEFICIENCY: ICD-10-CM

## 2021-04-19 DIAGNOSIS — K21.9 GASTROESOPHAGEAL REFLUX DISEASE WITHOUT ESOPHAGITIS: ICD-10-CM

## 2021-04-19 DIAGNOSIS — I10 ESSENTIAL HYPERTENSION: ICD-10-CM

## 2021-04-19 DIAGNOSIS — Z00.00 ROUTINE GENERAL MEDICAL EXAMINATION AT A HEALTH CARE FACILITY: ICD-10-CM

## 2021-04-19 DIAGNOSIS — I10 ESSENTIAL HYPERTENSION: Primary | ICD-10-CM

## 2021-04-19 LAB
BASOPHILS ABSOLUTE: 0.06 E9/L (ref 0–0.2)
BASOPHILS RELATIVE PERCENT: 0.9 % (ref 0–2)
CREATININE URINE: 76 MG/DL (ref 29–226)
EOSINOPHILS ABSOLUTE: 0.26 E9/L (ref 0.05–0.5)
EOSINOPHILS RELATIVE PERCENT: 4.1 % (ref 0–6)
HBA1C MFR BLD: 5.9 %
HCT VFR BLD CALC: 42.7 % (ref 34–48)
HEMOGLOBIN: 13.3 G/DL (ref 11.5–15.5)
IMMATURE GRANULOCYTES #: 0.02 E9/L
IMMATURE GRANULOCYTES %: 0.3 % (ref 0–5)
LYMPHOCYTES ABSOLUTE: 1.91 E9/L (ref 1.5–4)
LYMPHOCYTES RELATIVE PERCENT: 29.8 % (ref 20–42)
MCH RBC QN AUTO: 29.9 PG (ref 26–35)
MCHC RBC AUTO-ENTMCNC: 31.1 % (ref 32–34.5)
MCV RBC AUTO: 96 FL (ref 80–99.9)
MICROALBUMIN UR-MCNC: <12 MG/L
MICROALBUMIN/CREAT UR-RTO: ABNORMAL (ref 0–30)
MONOCYTES ABSOLUTE: 0.51 E9/L (ref 0.1–0.95)
MONOCYTES RELATIVE PERCENT: 8 % (ref 2–12)
NEUTROPHILS ABSOLUTE: 3.65 E9/L (ref 1.8–7.3)
NEUTROPHILS RELATIVE PERCENT: 56.9 % (ref 43–80)
PDW BLD-RTO: 12.4 FL (ref 11.5–15)
PLATELET # BLD: 266 E9/L (ref 130–450)
PMV BLD AUTO: 9.8 FL (ref 7–12)
RBC # BLD: 4.45 E12/L (ref 3.5–5.5)
VITAMIN D 25-HYDROXY: 19 NG/ML (ref 30–100)
WBC # BLD: 6.4 E9/L (ref 4.5–11.5)

## 2021-04-19 PROCEDURE — 4040F PNEUMOC VAC/ADMIN/RCVD: CPT | Performed by: NURSE PRACTITIONER

## 2021-04-19 PROCEDURE — 1123F ACP DISCUSS/DSCN MKR DOCD: CPT | Performed by: NURSE PRACTITIONER

## 2021-04-19 PROCEDURE — 3044F HG A1C LEVEL LT 7.0%: CPT | Performed by: NURSE PRACTITIONER

## 2021-04-19 PROCEDURE — G0296 VISIT TO DETERM LDCT ELIG: HCPCS | Performed by: NURSE PRACTITIONER

## 2021-04-19 PROCEDURE — G0439 PPPS, SUBSEQ VISIT: HCPCS | Performed by: NURSE PRACTITIONER

## 2021-04-19 PROCEDURE — 3017F COLORECTAL CA SCREEN DOC REV: CPT | Performed by: NURSE PRACTITIONER

## 2021-04-19 PROCEDURE — 83036 HEMOGLOBIN GLYCOSYLATED A1C: CPT | Performed by: NURSE PRACTITIONER

## 2021-04-19 RX ORDER — PANTOPRAZOLE SODIUM 40 MG/1
40 TABLET, DELAYED RELEASE ORAL 2 TIMES DAILY
Qty: 60 TABLET | Refills: 5 | Status: SHIPPED
Start: 2021-04-19 | End: 2021-05-14 | Stop reason: SDUPTHER

## 2021-04-19 ASSESSMENT — ENCOUNTER SYMPTOMS
CONSTIPATION: 0
DIARRHEA: 0
NAUSEA: 0
COUGH: 0
VOMITING: 0
WHEEZING: 0
EYE PAIN: 0
SHORTNESS OF BREATH: 0
SINUS PAIN: 0
COLOR CHANGE: 0
CHEST TIGHTNESS: 0

## 2021-04-19 ASSESSMENT — LIFESTYLE VARIABLES
AUDIT-C TOTAL SCORE: INCOMPLETE
AUDIT TOTAL SCORE: INCOMPLETE
HOW OFTEN DO YOU HAVE A DRINK CONTAINING ALCOHOL: 0
HOW OFTEN DO YOU HAVE A DRINK CONTAINING ALCOHOL: NEVER

## 2021-04-19 ASSESSMENT — PATIENT HEALTH QUESTIONNAIRE - PHQ9
SUM OF ALL RESPONSES TO PHQ QUESTIONS 1-9: 0
2. FEELING DOWN, DEPRESSED OR HOPELESS: 0
SUM OF ALL RESPONSES TO PHQ QUESTIONS 1-9: 0
2. FEELING DOWN, DEPRESSED OR HOPELESS: 0
SUM OF ALL RESPONSES TO PHQ9 QUESTIONS 1 & 2: 0
SUM OF ALL RESPONSES TO PHQ QUESTIONS 1-9: 0
SUM OF ALL RESPONSES TO PHQ QUESTIONS 1-9: 0
1. LITTLE INTEREST OR PLEASURE IN DOING THINGS: 0

## 2021-04-19 NOTE — PROGRESS NOTES
Medicare Annual Wellness Visit  Name: Karis Harding Date: 2021   MRN: [id number] Sex: Female   Age: 70 y.o. Ethnicity: Non-/Non    : 1950 Race: Delgado Anglin is here for Medicare AWV    Screenings for behavioral, psychosocial and functional/safety risks, and cognitive dysfunction are all negative except as indicated below. These results, as well as other patient data from the [address] form, are documented in Flowsheets linked to this Encounter. No Known Allergies      Prior to Visit Medications    Medication Sig Taking? Authorizing Provider   pantoprazole (PROTONIX) 40 MG tablet Take 1 tablet by mouth 2 times daily Yes ROXY Delong CNP   Alcohol Swabs (B-D SINGLE USE SWABS REGULAR) PADS USE AS DIRECTED AS NEEDED  TO TEST BLOOD SUGAR Yes ROXY Delong CNP   venlafaxine (EFFEXOR XR) 150 MG extended release capsule TAKE 1 CAPSULE EVERY DAY Yes ROXY Delong CNP   atenolol (TENORMIN) 25 MG tablet TAKE 1 TABLET EVERY DAY Yes ROXY Delong CNP   fluconazole (DIFLUCAN) 150 MG tablet Take 1 tablet by mouth every 72 hours Yes ROXY Delong CNP   Accu-Chek Softclix Lancets MISC TEST TWO TIMES DAILY Yes ROXY Delong CNP   quinapril (ACCUPRIL) 5 MG tablet TAKE 1 TABLET EVERY NIGHT Yes ROXY Delong CNP   simvastatin (ZOCOR) 80 MG tablet TAKE 1 TABLET EVERY NIGHT Yes ROXY Delong CNP   levothyroxine (SYNTHROID) 100 MCG tablet TAKE 1 TABLET EVERY DAY Yes ROXY Delong CNP   clotrimazole-betamethasone (LOTRISONE) 1-0.05 % cream Apply topically 2 times daily.  Yes ROXY Delong CNP   VICTOZA 18 MG/3ML SOPN SC injection INJECT 1.8MG UNDER THE SKIN EVERY DAY Yes ROXY Delong CNP   Insulin Pen Needle (DROPLET PEN NEEDLES) 31G X 6 MM MISC Inject 1 each into the skin 3 times daily Yes ROXY Delong CNP   blood glucose monitor strips PT TESTS TWICE DAILY Yes ROXY Contreras CNP   acetaminophen (TYLENOL) 500 MG tablet Take 1 tablet by mouth 4 times daily as needed for Pain Yes ROXY Contreras CNP   aspirin 81 MG EC tablet Take 1 tablet by mouth daily LD 12-4-19  Patient taking differently: Take 81 mg by mouth daily  Yes ROXY Contreras CNP   Blood Glucose Monitoring Suppl (ACCU-CHEK STEVEN PLUS) w/Device KIT CHECK BLOOD SUGAR TWICE DAILY Yes ROXY France CNP   nystatin (MYCOSTATIN) 699932 UNIT/ML suspension Take 5 mLs by mouth 4 times daily Continue 5 days past symptom improvement. Change tooth brush now & after treatment. ROXY Contreras CNP   nystatin (MYCOSTATIN) 153484 UNIT/GM cream Apply topically 2 times daily, continue 5 days following improvement of symptoms.   ROXY Contreras CNP         Past Medical History:   Diagnosis Date    Acute MI ([facility] [address] 07/11/08    CAD (coronary artery disease)     Dr. Casey France Carotid artery occlusion     Diabetes mellitus ([facility] [address]     Diverticulitis     Hyperlipidemia     Hypertension     Obese     Osteoarthritis     Thyroid nodule        Past Surgical History:   Procedure Laterality Date    CARPAL TUNNEL RELEASE      (L)    CARPAL TUNNEL RELEASE Right 12/13/2019    RIGHT CARPAL TUNNEL RELEASE ENDOSCOPIC performed by Khloe England MD at [facility] CATH LAB PROCEDURE      stent placed    [place] Right 12/13/2019    RIGHT THUMB TRIGGER RELEASE performed by Khloe England MD at [facility]  05/2017    LT knee torn meniscus / DR Bon Hughes  2017    THYROIDECTOMY N/A 1/16/2020    TOTAL THYROIDECTOMY--NERVE INTEGRITY MONITOR performed by Celso Sheridan DO at [facility]         Family History   Problem Relation Age of Onset    Heart Disease Mother     Heart Disease Father     Diabetes Father     Heart Disease Sister     Heart Disease Maternal Grandmother     Heart Disease Paternal Grandmother     Diabetes Paternal Grandmother        CareTeam (Including outside providers/suppliers regularly involved in providing care):   Patient Care Team:  ROXY Solomon CNP as PCP - General (Nurse Practitioner Family)  ROXY Solomon CNP as PCP - [facility]  Estella Duffy MD as Surgeon (Vascular Surgery)  Kun Smith MD as Consulting Physician (Cardiology)  Rito Alexandre as Imaging Navigator    Wt Readings from Last 3 Encounters:   04/27/21 175 lb (79.4 kg)   04/19/21 175 lb (79.4 kg)   03/18/20 171 lb (77.6 kg)     Vitals:    04/19/21 1053   BP: 134/74   Pulse: 74   Resp: 20   Temp: 98 °F (36.7 °C)   TempSrc: Oral   SpO2: 96%   Weight: 175 lb (79.4 kg)   Height: 4' 11\" (1.499 m)     Body mass index is 35.35 kg/m². Based upon direct observation of the patient, evaluation of cognition reveals recent and remote memory intact. Review of Systems   Constitutional: Negative for activity change, appetite change, chills and fever. HENT: Negative for congestion, ear pain, sinus pain and sneezing. Eyes: Negative for pain and visual disturbance. Respiratory: Negative for cough, chest tightness, shortness of breath and wheezing. Cardiovascular: Negative for chest pain (heartburn takes 2 tablets), palpitations and leg swelling. Gastrointestinal: Negative for constipation, diarrhea, nausea and vomiting. Endocrine: Negative for cold intolerance, heat intolerance and polyuria. Genitourinary: Negative for difficulty urinating. Musculoskeletal: Negative for arthralgias and myalgias. Skin: Negative for color change and rash. Neurological: Negative for dizziness, light-headedness and headaches. Hematological: Negative for adenopathy. Does not bruise/bleed easily. Psychiatric/Behavioral: Negative for agitation, decreased concentration, sleep disturbance and suicidal ideas. The patient is not nervous/anxious.         Lifestyle   Physical activity    Days per week: Not on file    Minutes per session: Not on file     Physical Exam  Vitals signs and nursing note reviewed. Constitutional:       General: She is not in acute distress. Appearance: Normal appearance. She is well-developed and normal weight. She is not ill-appearing, toxic-appearing or diaphoretic. HENT:      Head: Normocephalic and atraumatic. Right Ear: Tympanic membrane, ear canal and external ear normal. There is no impacted cerumen. Left Ear: Tympanic membrane, ear canal and external ear normal. There is no impacted cerumen. Ears:      Comments: ENT: canals clear, TMs intact and pearly gray, nasal turbinates erythematous and boggy, pharynx shows erythema and post nasal drip       Nose: Rhinorrhea present. No congestion. Mouth/Throat:      Mouth: Mucous membranes are moist.      Pharynx: Oropharynx is clear. No oropharyngeal exudate or posterior oropharyngeal erythema. Eyes:      Extraocular Movements: Extraocular movements intact. Conjunctiva/sclera: Conjunctivae normal.      Pupils: Pupils are equal, round, and reactive to light. Neck:      Musculoskeletal: Normal range of motion and neck supple. Thyroid: No thyromegaly. Cardiovascular:      Rate and Rhythm: Normal rate and regular rhythm. Pulses: Normal pulses. Heart sounds: Normal heart sounds. No murmur. Pulmonary:      Effort: Pulmonary effort is normal. No respiratory distress. Breath sounds: Normal breath sounds. No wheezing. Abdominal:      General: Bowel sounds are normal. There is no distension. Palpations: Abdomen is soft. Tenderness: There is no abdominal tenderness. There is no guarding or rebound. Genitourinary:     Vagina: Normal.      Comments: Deferred. Musculoskeletal: Normal range of motion. Lymphadenopathy:      Cervical: No cervical adenopathy. Skin:     General: Skin is warm and dry. Capillary Refill: Capillary refill takes less than 2 seconds.       Findings: No bruising, erythema or rash. Neurological:      General: No focal deficit present. Mental Status: She is alert and oriented to person, place, and time. Mental status is at baseline. Cranial Nerves: No cranial nerve deficit. Sensory: No sensory deficit. Motor: No weakness. Coordination: Coordination normal.      Gait: Gait normal.      Comments: Visual inspection:  Deformity/amputation: absent  Skin lesions/pre-ulcerative calluses: absent  Edema: right- negative, left- negative    Sensory exam:  Monofilament sensation: normal  (minimum of 5 random plantar locations tested, avoiding callused areas - > 1 area with absence of sensation is + for neuropathy)    Plus at least one of the following:  Pulses: normal,   Pinprick: Intact  Proprioception: Intact  Vibration (128 Hz): Intact     Psychiatric:         Mood and Affect: Mood normal.         Behavior: Behavior normal.         Thought Content: Thought content normal.         Judgment: Judgment normal.       Patient's complete Health Risk Assessment and screening values have been reviewed and are found in Flowsheets. The following problems were reviewed today and where indicated follow up appointments were made and/or referrals ordered. Positive Risk Factor Screenings with Interventions:            General Health and ACP:  General  In general, how would you say your health is?: Very Good  In the past 7 days, have you experienced any of the following?  New or Increased Pain, New or Increased Fatigue, Loneliness, Social Isolation, Stress or Anger?: None of These  Do you get the social and emotional support that you need?: Yes  Do you have a Living Will?: (!) No  Advance Directives     Power of [facility] Will ACP-Advance Directive ACP-Power of     Not on File Not on File Not on File Not on File      General Health Risk Interventions:  · No Living Will: Patient referred to [place] Habits/Nutrition:  Health Zoster Live (Zostavax) 10/26/2013        Health Maintenance   Topic Date Due    Diabetic retinal exam  Never done    Shingles Vaccine (2 of 3) 12/21/2013    Colon Cancer Screen FIT/FOBT  09/06/2018    Pneumococcal 65+ years Vaccine (2 of 2 - PPSV23) 02/23/2019    Annual Wellness Visit (AWV)  Never done    DTaP/Tdap/Td vaccine (2 - Td) 04/27/2020    Breast cancer screen  05/03/2020    Lipid screen  01/20/2022    Diabetic foot exam  04/19/2022    A1C test (Diabetic or Prediabetic)  04/19/2022    Diabetic microalbuminuria test  04/19/2022    Potassium monitoring  04/19/2022    Creatinine monitoring  04/19/2022    Low dose CT lung screening  04/27/2022    DEXA (modify frequency per FRAX score)  Completed    Flu vaccine  Completed    COVID-19 Vaccine  Completed    Hepatitis C screen  Completed    Hepatitis A vaccine  Aged Out    Hib vaccine  Aged Out    Meningococcal (ACWY) vaccine  Aged Out     Recommendations for [place] Due: see orders and patient instructions/AVS.  . Recommended screening schedule for the next 5-10 years is provided to the patient in written form: see Patient Instructions/AVS.    Elizabeth Polanco was seen today for medicare awv. Diagnoses and all orders for this visit:    Essential hypertension  -     CBC Auto Differential; Future  -     Comprehensive Metabolic Panel; Future  - The current medical regimen is effective;  continue present plan and medications. - The patient is asked to make an attempt to improve diet and exercise patterns to aid in medical management of this problem. Major depressive disorder, single episode, moderate (HCC)  - Stable  - The current medical regimen is effective;  continue present plan and medications. - Effexor     Type 2 diabetes mellitus without complication, without long-term current use of insulin (HCC)  -      DIABETES FOOT EXAM  -     Microalbumin / Creatinine Urine Ratio;  Future  -     POCT glycosylated hemoglobin (Hb A1C) 5.9 down from 6.2  - The patient is asked to make an attempt to improve diet and exercise patterns to aid in medical management of this problem. Vitamin D deficiency  -     Vitamin D 25 Hydroxy; Future    Gastroesophageal reflux disease without esophagitis  -     pantoprazole (PROTONIX) 40 MG tablet; Take 1 tablet by mouth 2 times daily    Former smoker  -     CT Lung Screen (Initial/Annual); Future    Personal history of tobacco use  -     [place] VISIT TO DISCUSS LUNG CA SCREEN W LDCT  - F/u in 1 year per imaging    Post-menopausal  -     Cancel: DEXA BONE DENSITY 2 SITES;  Future  -     DEXA BONE DENSITY AXIAL SKELETON; Future  - Pt is post-menopausal & has history of falls    Routine general medical examination at a health care facility  - RTO in 1 year for AWV, 6 months for check up/diabetic A1C

## 2021-04-19 NOTE — PATIENT INSTRUCTIONS
What is lung cancer screening? Lung cancer screening is a way in which doctors check the lungs for early signs of cancer in people who have no symptoms of lung cancer. A low-dose CT scan uses much less radiation than a normal CT scan and shows a more detailed image of the lungs than a standard X-ray. The goal of lung cancer screening is to find cancer early, before it has a chance to grow, spread, or cause problems. One large study found that smokers who were screened with low-dose CT scans were less likely to die of lung cancer than those who were screened with standard X-ray. Below is a summary of the things you need to know regarding screening for lung cancer with low-dose computed tomography (LDCT). This is a screening program that involves routine annual screening with LDCT studies of the lung. The LDCTs are done using low-dose radiation that is not thought to increase your cancer risk. If you have other serious medical conditions (other cancers, congestive heart failure) that limit your life expectancy to less than 10 years, you should not undergo lung cancer screening with LDCT. The chance is 20%-60% that the LDCT result will show abnormalities. This would require additional testing which could include repeat imaging or even invasive procedures. Most (about 95%) of \"abnormal\" LDCT results are false in the sense that no lung cancer is ultimately found. Additionally, some (about 10%) of the cancers found would not affect your life expectancy, even if undetected and untreated. If you are still smoking, the single most important thing that you can do to reduce your risk of dying of lung cancer is to quit. For this screening to be covered by Medicare and most other insurers, strict criteria must be met. If you do not meet these criteria, but still wish to undergo LDCT testing, you will be required to sign a waiver indicating your willingness to pay for the scan.   Personalized Preventive Plan cindi Vieyra - 4/19/2021  Medicare offers a range of preventive health benefits. Some of the tests and screenings are paid in full while other may be subject to a deductible, co-insurance, and/or copay. Some of these benefits include a comprehensive review of your medical history including lifestyle, illnesses that may run in your family, and various assessments and screenings as appropriate. After reviewing your medical record and screening and assessments performed today your provider may have ordered immunizations, labs, imaging, and/or referrals for you. A list of these orders (if applicable) as well as your Preventive Care list are included within your After Visit Summary for your review. Other Preventive Recommendations:    · A preventive eye exam performed by an eye specialist is recommended every 1-2 years to screen for glaucoma; cataracts, macular degeneration, and other eye disorders. · A preventive dental visit is recommended every 6 months. · Try to get at least 150 minutes of exercise per week or 10,000 steps per day on a pedometer . · Order or download the FREE \"Exercise & Physical Activity: Your Everyday Guide\" from [employer]. Call 8-683.639.5599 or search [employer] online. · You need 3699-8160 mg of calcium and 9717-0050 IU of vitamin D per day. It is possible to meet your calcium requirement with diet alone, but a vitamin D supplement is usually necessary to meet this goal.  · When exposed to the sun, use a sunscreen that protects against both UVA and UVB radiation with an SPF of 30 or greater. Reapply every 2 to 3 hours or after sweating, drying off with a towel, or swimming. · Always wear a seat belt when traveling in a car. Always wear a helmet when riding a bicycle or motorcycle.

## 2021-04-20 ENCOUNTER — PATIENT MESSAGE (OUTPATIENT)
Dept: FAMILY MEDICINE CLINIC | Age: 71
End: 2021-04-20

## 2021-04-20 DIAGNOSIS — B37.9 YEAST INFECTION: Primary | ICD-10-CM

## 2021-04-20 LAB
ALBUMIN SERPL-MCNC: 4.9 G/DL (ref 3.5–5.2)
ALP BLD-CCNC: 121 U/L (ref 35–104)
ALT SERPL-CCNC: 39 U/L (ref 0–32)
ANION GAP SERPL CALCULATED.3IONS-SCNC: 16 MMOL/L (ref 7–16)
AST SERPL-CCNC: 34 U/L (ref 0–31)
BILIRUB SERPL-MCNC: 0.2 MG/DL (ref 0–1.2)
BUN BLDV-MCNC: 21 MG/DL (ref 8–23)
CALCIUM SERPL-MCNC: 10.2 MG/DL (ref 8.6–10.2)
CHLORIDE BLD-SCNC: 105 MMOL/L (ref 98–107)
CO2: 23 MMOL/L (ref 22–29)
CREAT SERPL-MCNC: 0.8 MG/DL (ref 0.5–1)
GFR AFRICAN AMERICAN: >60
GFR NON-AFRICAN AMERICAN: >60 ML/MIN/1.73
GLUCOSE BLD-MCNC: 130 MG/DL (ref 74–99)
POTASSIUM SERPL-SCNC: 5.2 MMOL/L (ref 3.5–5)
SODIUM BLD-SCNC: 144 MMOL/L (ref 132–146)
TOTAL PROTEIN: 7.8 G/DL (ref 6.4–8.3)

## 2021-04-20 NOTE — TELEPHONE ENCOUNTER
From: Anuel Corona  To: Riya Cardona, APRN - CNP  Sent: 4/20/2021 12:17 PM EDT  Subject: Non-Urgent Medical Question    Tess Cash, My appt. with Delatore was cancelled until next monday. Also forgot about the thrush. It did not go away with those two pills you called in maybe need something else. Thanks [employer].  It was so nice to see you

## 2021-04-21 RX ORDER — NYSTATIN 100000 U/G
CREAM TOPICAL
Qty: 30 G | Refills: 2 | Status: SHIPPED
Start: 2021-04-21 | End: 2021-06-01 | Stop reason: ALTCHOICE

## 2021-04-26 ENCOUNTER — TELEPHONE (OUTPATIENT)
Dept: CASE MANAGEMENT | Age: 71
End: 2021-04-26

## 2021-04-26 ENCOUNTER — TELEPHONE (OUTPATIENT)
Dept: VASCULAR SURGERY | Age: 71
End: 2021-04-26

## 2021-04-26 NOTE — TELEPHONE ENCOUNTER
I called the patient and she confirmed her CT lung screening at [facility] on 4/27/2021 at 5:00 pm.  I reminded the patient to arrive at 4:30 pm, enter through the main entrance, and register. Patient confirmed.             Electronically signed by Dima Pruitt on 4/26/21 at 9:22 AM EDT

## 2021-04-27 ENCOUNTER — OFFICE VISIT (OUTPATIENT)
Dept: VASCULAR SURGERY | Age: 71
End: 2021-04-27
Payer: MEDICARE

## 2021-04-27 ENCOUNTER — HOSPITAL ENCOUNTER (OUTPATIENT)
Dept: CARDIOLOGY | Age: 71
Discharge: HOME | End: 2021-04-27
Payer: MEDICARE

## 2021-04-27 ENCOUNTER — HOSPITAL ENCOUNTER (OUTPATIENT)
Dept: CT IMAGING | Age: 71
Discharge: HOME | End: 2021-04-29
Payer: MEDICARE

## 2021-04-27 ENCOUNTER — PATIENT MESSAGE (OUTPATIENT)
Dept: FAMILY MEDICINE CLINIC | Age: 71
End: 2021-04-27

## 2021-04-27 ENCOUNTER — HOSPITAL ENCOUNTER (OUTPATIENT)
Dept: GENERAL RADIOLOGY | Age: 71
Discharge: HOME | End: 2021-04-29
Payer: MEDICARE

## 2021-04-27 VITALS — BODY MASS INDEX: 35.28 KG/M2 | HEIGHT: 59 IN | WEIGHT: 175 LBS

## 2021-04-27 DIAGNOSIS — Z78.0 POST-MENOPAUSAL: ICD-10-CM

## 2021-04-27 DIAGNOSIS — I65.23 BILATERAL CAROTID ARTERY STENOSIS: Primary | ICD-10-CM

## 2021-04-27 DIAGNOSIS — B37.9 YEAST INFECTION: Primary | ICD-10-CM

## 2021-04-27 DIAGNOSIS — I65.23 BILATERAL CAROTID ARTERY STENOSIS: ICD-10-CM

## 2021-04-27 DIAGNOSIS — B37.0 THRUSH: ICD-10-CM

## 2021-04-27 DIAGNOSIS — Z87.891 FORMER SMOKER: ICD-10-CM

## 2021-04-27 PROCEDURE — 3017F COLORECTAL CA SCREEN DOC REV: CPT | Performed by: SURGERY

## 2021-04-27 PROCEDURE — G8417 CALC BMI ABV UP PARAM F/U: HCPCS | Performed by: SURGERY

## 2021-04-27 PROCEDURE — 93880 EXTRACRANIAL BILAT STUDY: CPT

## 2021-04-27 PROCEDURE — 1036F TOBACCO NON-USER: CPT | Performed by: SURGERY

## 2021-04-27 PROCEDURE — 77080 DXA BONE DENSITY AXIAL: CPT

## 2021-04-27 PROCEDURE — 99213 OFFICE O/P EST LOW 20 MIN: CPT | Performed by: SURGERY

## 2021-04-27 PROCEDURE — 71271 CT THORAX LUNG CANCER SCR C-: CPT

## 2021-04-27 PROCEDURE — G8400 PT W/DXA NO RESULTS DOC: HCPCS | Performed by: SURGERY

## 2021-04-27 PROCEDURE — 1090F PRES/ABSN URINE INCON ASSESS: CPT | Performed by: SURGERY

## 2021-04-27 PROCEDURE — 1123F ACP DISCUSS/DSCN MKR DOCD: CPT | Performed by: SURGERY

## 2021-04-27 PROCEDURE — G8427 DOCREV CUR MEDS BY ELIG CLIN: HCPCS | Performed by: SURGERY

## 2021-04-27 PROCEDURE — 4040F PNEUMOC VAC/ADMIN/RCVD: CPT | Performed by: SURGERY

## 2021-04-27 NOTE — PROGRESS NOTES
Vascular Surgery Outpatient Progress Note      Chief Complaint   Patient presents with    Circulatory Problem     carotid artery       HISTORY OF PRESENT ILLNESS:                The patient is a 70 y.o. female who returns for follow-up evaluation of carotid artery stenosis. She denies any recent hospitalizations. She denies any neurologic symptoms. She states that she did have her thyroid removed for nodules that showed benign disease. She complains of arthritis in her left hip and knee. Past Medical History:        Diagnosis Date    Acute MI ([facility] 07/11/08    CAD (coronary artery disease)     Dr. Desiree Nava Carotid artery occlusion     Diabetes mellitus ([facility]     Diverticulitis     Hyperlipidemia     Hypertension     Obese     Osteoarthritis     Thyroid nodule      Past Surgical History:        Procedure Laterality Date    CARPAL TUNNEL RELEASE      (L)    CARPAL TUNNEL RELEASE Right 12/13/2019    RIGHT CARPAL TUNNEL RELEASE ENDOSCOPIC performed by Kwadwo Patel MD at [facility] CATH LAB PROCEDURE      stent placed    [place] Right 12/13/2019    RIGHT THUMB TRIGGER RELEASE performed by Kwadwo Patel MD at [facility]  05/2017    LT knee torn meniscus / [facility]  2017    THYROIDECTOMY N/A 1/16/2020    TOTAL THYROIDECTOMY--NERVE INTEGRITY MONITOR performed by Sebastián Gtz DO at [facility] [place]     Current Medications:   Prior to Admission medications    Medication Sig Start Date End Date Taking? Authorizing Provider   nystatin (MYCOSTATIN) 313113 UNIT/ML suspension Take 5 mLs by mouth 4 times daily Continue 5 days past symptom improvement. Change tooth brush now & after treatment. 4/21/21  Yes ROXY Michelle CNP   nystatin (MYCOSTATIN) 970114 UNIT/GM cream Apply topically 2 times daily, continue 5 days following improvement of symptoms.  4/21/21  Yes ROXY Michelle CNP   pantoprazole (PROTONIX) 40 MG tablet Take 1 tablet by mouth 2 times daily 4/19/21  Yes ROXY Michelle CNP   Alcohol Swabs (B-D SINGLE USE SWABS REGULAR) PADS USE AS DIRECTED AS NEEDED  TO TEST BLOOD SUGAR 4/14/21  Yes ROXY Michelle CNP   venlafaxine (EFFEXOR XR) 150 MG extended release capsule TAKE 1 CAPSULE EVERY DAY 4/10/21  Yes ROXY Michelle CNP   atenolol (TENORMIN) 25 MG tablet TAKE 1 TABLET EVERY DAY 4/10/21  Yes ROXY Michelle CNP   fluconazole (DIFLUCAN) 150 MG tablet Take 1 tablet by mouth every 72 hours 4/5/21  Yes ROXY Michelle CNP   Accu-Chek Softclix Lancets MISC TEST TWO TIMES DAILY 2/23/21  Yes ROXY Michelle CNP   quinapril (ACCUPRIL) 5 MG tablet TAKE 1 TABLET EVERY NIGHT 2/23/21  Yes ROXY Michelle CNP   simvastatin (ZOCOR) 80 MG tablet TAKE 1 TABLET EVERY NIGHT 2/1/21  Yes ROXY Michelle CNP   levothyroxine (SYNTHROID) 100 MCG tablet TAKE 1 TABLET EVERY DAY 1/5/21  Yes ROXY Michelle CNP   clotrimazole-betamethasone (LOTRISONE) 1-0.05 % cream Apply topically 2 times daily. 12/1/20  Yes ROXY Michelle CNP   VICTOZA 18 MG/3ML SOPN SC injection INJECT 1.8MG UNDER THE SKIN EVERY DAY 10/21/20  Yes ROXY Michelle CNP   Insulin Pen Needle (DROPLET PEN NEEDLES) 31G X 6 MM MISC Inject 1 each into the skin 3 times daily 6/12/20  Yes ROXY Michelle CNP   blood glucose monitor strips PT TESTS TWICE DAILY 6/12/20  Yes ROXY Michelle CNP   acetaminophen (TYLENOL) 500 MG tablet Take 1 tablet by mouth 4 times daily as needed for Pain 6/12/20  Yes ROXY Michelle CNP   aspirin 81 MG EC tablet Take 1 tablet by mouth daily LD 12-4-19  Patient taking differently: Take 81 mg by mouth daily  12/16/19  Yes ROXY Michelle CNP   Blood Glucose Monitoring Suppl (Mary Carmen Roberts) w/Device KIT CHECK BLOOD SUGAR TWICE DAILY 9/28/18  Yes ROXY Kemp CNP     Allergies:  Patient has no known allergies.     Social History Socioeconomic History    Marital status:      Spouse name: Not on file    Number of children: Not on file    Years of education: Not on file    Highest education level: Not on file   Occupational History    Occupation: retired    Social Needs    Financial resource strain: Not on file    Food insecurity     Worry: Not on file     Inability: Not on file   [employer] needs     Medical: Not on file     Non-medical: Not on file   Tobacco Use    Smoking status: Former Smoker     Packs/day: 1.00     Years: 43.00     Pack years: 43.00     Start date: 1968     Quit date: 3/18/2011     Years since quitting: 10.1    Smokeless tobacco: Never Used   Substance and Sexual Activity    Alcohol use: No    Drug use: No    Sexual activity: Yes   Lifestyle    Physical activity     Days per week: Not on file     Minutes per session: Not on file    Stress: Not on file   Relationships    Social connections     Talks on phone: Not on file     Gets together: Not on file     Attends Sabianist service: Not on file     Active member of club or organization: Not on file     Attends meetings of clubs or organizations: Not on file     Relationship status: Not on file    Intimate partner violence     Fear of current or ex partner: Not on file     Emotionally abused: Not on file     Physically abused: Not on file     Forced sexual activity: Not on file   Other Topics Concern    Not on file   Social History Narrative    Not on file        Family History   Problem Relation Age of Onset    Heart Disease Mother     Heart Disease Father     Diabetes Father     Heart Disease Sister     Heart Disease Maternal Grandmother     Heart Disease Paternal Grandmother     Diabetes Paternal Grandmother        REVIEW OF SYSTEMS (New symptoms):    Eyes:      Blurred vision:  No [x]/Yes []               Diplopia:   No [x]/Yes []               Vision loss:       No [x]/Yes []   Ears, nose, throat:             Hearing loss:    No [x]/Yes []      Vertigo:   No [x]/Yes []                       Swallowing problem:  No [x]/Yes []               Nose bleeds:   No [x]/Yes []      Voice hoarseness:  No [x]/Yes []  Respiratory:             Cough:   No [x]/Yes []      Pleuritic chest pain:  No [x]/Yes []                        Dyspnea:   No [x]/Yes []      Wheezing:   No [x]/Yes []  Cardiovascular:             Angina:   No [x]/Yes []      Palpitations:   No [x]/Yes []          Claudication:    No [x]/Yes []      Leg swelling:   No [x]/Yes []  Gastrointestinal:             Nausea or vomiting:  No [x]/Yes []               Abdominal pain:  No [x]/Yes []                     Intestinal bleeding: No [x]/Yes []  Musculoskeletal:             Leg pain:   No []/Yes [x]      Back pain:   No [x]/Yes []                    Weakness:   No [x]/Yes []  Neurologic:             Numbness:   No [x]/Yes []      Paralysis:   No [x]/Yes []                       Headaches:   No [x]/Yes []  Hematologic, lymphatic:   Anemia:   No [x]/Yes []              Bleeding or bruising:  No [x]/Yes []              Fevers or chills: No [x]/Yes []  Endocrine:             Temp intolerance:   No [x]/Yes []                       Polydipsia, polyuria:  No [x]/Yes []  Skin:              Rash:    No [x]/Yes []      Ulcers:   No [x]/Yes []              Abnorm pigment: No [x]/Yes []  :              Frequency/urgency:  No [x]/Yes []      Hematuria:    No [x]/Yes []                      Incontinence:    No [x]/Yes []    PHYSICAL EXAM:  There were no vitals filed for this visit.   General Appearance: alert and oriented to person, place and time, in no acute distress, well developed and well- nourished  Neurologic: no cranial nerve deficit, speech normal  Head: normocephalic and atraumatic  Eyes: extraocular eye movements intact, conjunctivae normal  ENT: external ear and ear canal normal bilaterally, nose without deformity, no carotid bruits  Pulmonary/Chest: normal air movement, no

## 2021-04-27 NOTE — PROGRESS NOTES
[facility] Heart & Vascular Lab - [facility]    This is a pre read worksheet - prior to official physician interpretation    Bob Snider  1950  Date of study: 4/27/21    Indication for study:  Carotid artery stenosis  Study : Bilateral Carotid Artery Duplex Examination    Duplex examination of the RIGHT carotid artery system identifies atherosclerotic plaque. The peak systolic velocity in internal carotid artery was 189 centimeters / second. The maximum end diastolic velocity was 50 centimeters / second. The ICA/CCA ratio is 2.0. The right vertebral artery has antegrade flow. Duplex examination of the LEFT carotid artery system identifies atherosclerotic plaque. The peak systolic velocity in internal carotid artery was 113 centimeters / second. The maximum end diastolic velocity was 31 centimeters / second. The ICA/CCA ratio is 1.0. The left vertebral artery has antegrade flow.         LAST STUDY  4/23/2019  Rt 50-69  Lt 0-40

## 2021-04-28 ENCOUNTER — TELEPHONE (OUTPATIENT)
Dept: CASE MANAGEMENT | Age: 71
End: 2021-04-28

## 2021-04-28 NOTE — TELEPHONE ENCOUNTER
From: Claude Sanchez  To: Jaquelin Riggs APRN - CNP  Sent: 4/27/2021 6:57 PM EDT  Subject: Non-Urgent Medical Question    Nena Jacobo but I can NOT do the liquid medicine you prescribed for my thrush. I really tried but I gag too easy and I couldn't even swish it in my mouth let alone swallow it.  [facility] [employer]

## 2021-04-29 RX ORDER — FLUCONAZOLE 100 MG/1
100 TABLET ORAL DAILY
Qty: 14 TABLET | Refills: 0 | Status: SHIPPED | OUTPATIENT
Start: 2021-04-29 | End: 2021-05-13

## 2021-05-14 DIAGNOSIS — K21.9 GASTROESOPHAGEAL REFLUX DISEASE WITHOUT ESOPHAGITIS: ICD-10-CM

## 2021-05-14 RX ORDER — PANTOPRAZOLE SODIUM 40 MG/1
40 TABLET, DELAYED RELEASE ORAL 2 TIMES DAILY
Qty: 180 TABLET | Refills: 1 | Status: SHIPPED
Start: 2021-05-14 | End: 2021-10-26

## 2021-05-14 NOTE — TELEPHONE ENCOUNTER
Last Appointment:  4/19/2021  Future Appointments   Date Time Provider Radha Vargas   10/19/2021  9:00 AM ROXY Delong CNP [facility]   4/25/2022  9:00 AM ROXY Delong CNP [facility]   4/25/2023 10:30 AM [facility] VAS US RM 1 SEYZ CARDIO Sueanne Quinn   4/25/2023 11:00 AM Angie Pierson MD [facility]

## 2021-05-17 DIAGNOSIS — E04.1 THYROID NODULE: ICD-10-CM

## 2021-05-18 RX ORDER — LEVOTHYROXINE SODIUM 0.1 MG/1
TABLET ORAL
Qty: 90 TABLET | Refills: 1 | Status: SHIPPED
Start: 2021-05-18 | End: 2021-10-26

## 2021-05-20 ENCOUNTER — PATIENT MESSAGE (OUTPATIENT)
Dept: FAMILY MEDICINE CLINIC | Age: 71
End: 2021-05-20

## 2021-05-20 DIAGNOSIS — B37.0 THRUSH: ICD-10-CM

## 2021-05-20 DIAGNOSIS — K21.9 GASTROESOPHAGEAL REFLUX DISEASE WITHOUT ESOPHAGITIS: Primary | ICD-10-CM

## 2021-05-20 NOTE — TELEPHONE ENCOUNTER
From: Charlette Baca  To: ROXY Gonzalez - [place]  Sent: 5/20/2021 1:32 PM EDT  Subject: Non-Urgent Medical Question    [id number] Marg[address] Cazares as long as I'm going to be asleep before they shove something down my throat that's fine.

## 2021-05-21 ENCOUNTER — TELEPHONE (OUTPATIENT)
Dept: SURGERY | Age: 71
End: 2021-05-21

## 2021-05-21 NOTE — TELEPHONE ENCOUNTER
MA contacted patient to schedule appointment for GERD consult based on referral by ROXY Boyle. Patient scheduled for appointment on 6/1/2021 @ 12:45pm with Dr Julio Cannon in [place]. Patient informed of date, time, location, and what to bring to appointment.     Electronically signed by Kellie Cabezas on 5/21/21 at 2:36 PM EDT

## 2021-05-29 ENCOUNTER — HOSPITAL ENCOUNTER (EMERGENCY)
Age: 71
Discharge: HOME | End: 2021-05-29
Payer: MEDICARE

## 2021-05-29 VITALS
DIASTOLIC BLOOD PRESSURE: 75 MMHG | OXYGEN SATURATION: 92 % | RESPIRATION RATE: 17 BRPM | TEMPERATURE: 97.1 F | SYSTOLIC BLOOD PRESSURE: 122 MMHG | HEART RATE: 96 BPM

## 2021-05-29 ASSESSMENT — PAIN SCALES - GENERAL: PAINLEVEL_OUTOF10: 7

## 2021-05-29 ASSESSMENT — PAIN DESCRIPTION - LOCATION: LOCATION: ABDOMEN

## 2021-05-29 ASSESSMENT — PAIN DESCRIPTION - DESCRIPTORS: DESCRIPTORS: DISCOMFORT;CRAMPING;RADIATING

## 2021-05-29 ASSESSMENT — PAIN DESCRIPTION - ORIENTATION: ORIENTATION: LEFT;LOWER

## 2021-06-01 ENCOUNTER — OFFICE VISIT (OUTPATIENT)
Dept: SURGERY | Age: 71
End: 2021-06-01
Payer: MEDICARE

## 2021-06-01 VITALS
WEIGHT: 175 LBS | BODY MASS INDEX: 35.28 KG/M2 | SYSTOLIC BLOOD PRESSURE: 124 MMHG | HEIGHT: 59 IN | RESPIRATION RATE: 16 BRPM | HEART RATE: 82 BPM | DIASTOLIC BLOOD PRESSURE: 68 MMHG

## 2021-06-01 DIAGNOSIS — R12 HEARTBURN: Primary | ICD-10-CM

## 2021-06-01 DIAGNOSIS — K30 INDIGESTION: ICD-10-CM

## 2021-06-01 DIAGNOSIS — R12 BURNING REFLUX: ICD-10-CM

## 2021-06-01 PROCEDURE — G8427 DOCREV CUR MEDS BY ELIG CLIN: HCPCS | Performed by: SURGERY

## 2021-06-01 PROCEDURE — G8417 CALC BMI ABV UP PARAM F/U: HCPCS | Performed by: SURGERY

## 2021-06-01 PROCEDURE — 1090F PRES/ABSN URINE INCON ASSESS: CPT | Performed by: SURGERY

## 2021-06-01 PROCEDURE — 99204 OFFICE O/P NEW MOD 45 MIN: CPT | Performed by: SURGERY

## 2021-06-01 ASSESSMENT — ENCOUNTER SYMPTOMS
BACK PAIN: 1
DIARRHEA: 0
WHEEZING: 0
BLOOD IN STOOL: 0
ABDOMINAL DISTENTION: 0
VOMITING: 0
COUGH: 0
CHOKING: 0
SHORTNESS OF BREATH: 0
COLOR CHANGE: 0
CONSTIPATION: 0
NAUSEA: 0
CHEST TIGHTNESS: 0
ANAL BLEEDING: 0
ABDOMINAL PAIN: 0
EYES NEGATIVE: 1

## 2021-06-01 NOTE — PATIENT INSTRUCTIONS
Call [phone] for any questions/concerns. Patient Information and Instructions for  Upper GI Endoscopy or Esophagogastroduodenoscopy [EGD])         Definition Upper GI Endoscopy or Esophagogastroduodenoscopy [EGD])  This is a test that uses a fiberoptic scope to examine the esophagus (throat), stomach, and upper part of the small intestines. Upper GI endoscopy may be recommended if you have:   Abdominal pain   Severe heartburn   Persistent nausea and vomiting   Difficulty swallowing   Blood in stool or vomit   Abnormal x-ray or other examinations of the gastrointestinal tract     Conditions that can be diagnosed with upper GI endoscopy include:   Ulcers   Tumors   Polyps   Abnormal narrowing   Inflammation     Possible Complications   Complications are rare, but no procedure is completely free of risk. If you are planning to have upper GI endoscopy, your doctor will review a list of possible complications, which may include:   Bleeding   Damage to the esophagus, stomach, or intestine   Infection   Respiratory depression (reduced breathing rate and/or depth)   Reaction to sedatives or anesthesia causing your blood pressure to drop    Some factors that may increase the risk of complications include:   Age: 61 or older   Pregnancy   Obesity   Smoking , alcoholism , or drug use   Malnutrition   Recent illness   Diabetes   Heart or lung problems   Bleeding disorders   Use of certain medicines     Be sure to discuss these risks with your doctor before the test.     What to Expect   Prior to test   Leading up to the test:   Arrange for a ride home after the test. Also, arrange for help at home. The night before, eat a light meal.  Do not eat or drink anything after midnight the night before the test.   Talk to your doctor about your medicines.  You may be asked to stop taking some medicines up to one week before the procedure, like:   Anti-inflammatory drugs (e.g., aspirin )   Blood thinners, like clopidogrel (Plavix) or warfarin (Coumadin)     Description of the Test   You will be asked to lie on your left side. You will have monitors tracking your breathing, heart rate, and blood oxygen levels. You will be given supplemental oxygen to breathe through your nose. A mouthpiece will be positioned to help keep your mouth open. Your throat may be sprayed with a numbing medicine. You will be given a sedative through an IV to help you relax during the test.  During the test, a small suction tube will be used to clear saliva and fluids from your mouth. The endoscope will be lubricated and placed in your mouth. You will be asked to try to swallow it. Then, it will be carefully and slowly advanced down your throat. It will be passed through your esophagus and into your stomach and intestine. While the endoscope is being advanced, your doctor will view the images on the screen. Air will be passed through the endoscope into your digestive tract. This will be done to smooth the normal folds in the tissues, allowing your doctor to view the tissue more easily. Tiny tools may be passed through the endoscope in order to take biopsies or do other tests. After Test   After the test, you will be observed for an hour. Then, you will be allowed to go home. When you return home after the test, do the following to help ensure a smooth recovery:   Rest when you get home. Ask your doctor if you can resume your normal diet. In most cases, you will be able to. Sedatives can slow your reaction time. Do not drive or use machinery for the rest of the day. Avoid alcohol for the rest of the day. Be sure to follow your doctor's instructions . How Long Will It Take? Usually about 10-15 minutes     Will It Hurt? Most people do not feel anything during the test and will not remember the test.  After the test, your throat may be sore and you may feel bloated.      Results   This test gives your doctor information about the health of your digestive system. The results can help to explain your symptoms. You and your doctor will talk about the results and your treatment plan. Call Your Doctor   After the test, call your doctor if any of the following occurs:   Signs of infection, including fever and chills   Severe abdominal pain   Hard, swollen abdomen   Difficulty swallowing or breathing   Any change or increase in your original symptoms   Bloody or black tarry colored stools   Nausea and/or vomiting   Cough, shortness of breath, or chest pain   Bleeding     In case of emergency, call 911.

## 2021-06-01 NOTE — PROGRESS NOTES
DAILY 200 each 2    quinapril (ACCUPRIL) 5 MG tablet TAKE 1 TABLET EVERY NIGHT 90 tablet 2    simvastatin (ZOCOR) 80 MG tablet TAKE 1 TABLET EVERY NIGHT 90 tablet 2    VICTOZA 18 MG/3ML SOPN SC injection INJECT 1.8MG UNDER THE SKIN EVERY DAY 27 mL 5    Insulin Pen Needle (DROPLET PEN NEEDLES) 31G X 6 MM MISC Inject 1 each into the skin 3 times daily 100 each 5    blood glucose monitor strips PT TESTS TWICE DAILY 200 strip 5    acetaminophen (TYLENOL) 500 MG tablet Take 1 tablet by mouth 4 times daily as needed for Pain 360 tablet 1    aspirin 81 MG EC tablet Take 1 tablet by mouth daily LD 12-4-19 (Patient taking differently: Take 81 mg by mouth daily ) 90 tablet 1    Blood Glucose Monitoring Suppl (ACCU-CHEK STEVEN PLUS) w/Device KIT CHECK BLOOD SUGAR TWICE DAILY 1 kit 0     No current facility-administered medications for this visit.        No Known Allergies    Family History   Problem Relation Age of Onset    Heart Disease Mother     Heart Disease Father     Diabetes Father     Heart Disease Sister     Heart Disease Maternal Grandmother     Heart Disease Paternal Grandmother     Diabetes Paternal Grandmother        Social History     Socioeconomic History    Marital status:      Spouse name: Not on file    Number of children: Not on file    Years of education: Not on file    Highest education level: Not on file   Occupational History    Occupation: retired    Tobacco Use    Smoking status: Former Smoker     Packs/day: 1.00     Years: 43.00     Pack years: 43.00     Start date: 1968     Quit date: 3/18/2011     Years since quitting: 10.2    Smokeless tobacco: Never Used   Vaping Use    Vaping Use: Never used   Substance and Sexual Activity    Alcohol use: No    Drug use: No    Sexual activity: Yes   Other Topics Concern    Not on file   Social History Narrative    Not on file     Social Determinants of Health     Financial Resource Strain:     Difficulty of Paying Living Expenses:    Food Insecurity:     Worried About Running Out of Food in the Last Year:     [address] in the Last Year:    Transportation Needs:     Lack of Transportation (Medical):  Lack of Transportation (Non-Medical):    Physical Activity:     Days of Exercise per Week:     Minutes of Exercise per Session:    Stress:     Feeling of Stress :    Social Connections:     Frequency of Communication with Friends and Family:     Frequency of Social Gatherings with Friends and Family:     Attends Congregational Services:     Active Member of Clubs or Organizations:     Attends Club or Organization Meetings:     Marital Status:    Intimate Partner Violence:     Fear of Current or Ex-Partner:     Emotionally Abused:     Physically Abused:     Sexually Abused:        Review of Systems   Constitutional: Negative for activity change, appetite change, chills, fever and unexpected weight change. HENT: Negative. Eyes: Negative. Respiratory: Negative for cough, choking, chest tightness, shortness of breath and wheezing. Cardiovascular: Negative for chest pain, palpitations and leg swelling. Gastrointestinal: Negative for abdominal distention, abdominal pain, anal bleeding, blood in stool, constipation, diarrhea, nausea and vomiting. Endocrine: Negative for cold intolerance, heat intolerance, polydipsia and polyuria. Genitourinary: Negative for dysuria, frequency, hematuria, urgency, vaginal bleeding, vaginal discharge and vaginal pain. Musculoskeletal: Positive for arthralgias, back pain, gait problem, joint swelling and myalgias. Negative for neck pain and neck stiffness. Skin: Negative for color change, pallor, rash and wound. Allergic/Immunologic: Negative for environmental allergies and food allergies. Neurological: Negative for dizziness, seizures, syncope, weakness, light-headedness, numbness and headaches. Hematological: Negative for adenopathy. Does not bruise/bleed easily. Psychiatric/Behavioral: Negative for agitation, confusion, decreased concentration, hallucinations, self-injury and suicidal ideas. The patient is not nervous/anxious and is not hyperactive. Objective:   Physical Exam  Constitutional:       General: She is not in acute distress. Appearance: Normal appearance. She is not ill-appearing or toxic-appearing. HENT:      Head: Normocephalic and atraumatic. Nose: Nose normal.      Mouth/Throat:      Mouth: Mucous membranes are moist.   Eyes:      General: No scleral icterus. Right eye: No discharge. Left eye: No discharge. Extraocular Movements: Extraocular movements intact. Conjunctiva/sclera: Conjunctivae normal.      Pupils: Pupils are equal, round, and reactive to light. Cardiovascular:      Rate and Rhythm: Normal rate and regular rhythm. Heart sounds: Normal heart sounds. No murmur heard. Pulmonary:      Effort: Pulmonary effort is normal. No respiratory distress. Breath sounds: Normal breath sounds. No stridor. No wheezing, rhonchi or rales. Abdominal:      General: Bowel sounds are normal. There is no distension. Palpations: Abdomen is soft. There is no mass. Tenderness: There is no abdominal tenderness. There is no guarding or rebound. Hernia: No hernia is present. Musculoskeletal:         General: Normal range of motion. Cervical back: Normal range of motion and neck supple. Skin:     General: Skin is warm and dry. Neurological:      General: No focal deficit present. Mental Status: She is alert and oriented to person, place, and time. Psychiatric:         Mood and Affect: Mood normal.         Behavior: Behavior normal.         Thought Content: Thought content normal.         Judgment: Judgment normal.         Assessment:      Heartburn  Indigestion  Burning reflux      Plan:      Recommend EGD with possible biopsy.    The patient was explained the

## 2021-06-02 ENCOUNTER — TELEPHONE (OUTPATIENT)
Dept: SURGERY | Age: 71
End: 2021-06-02

## 2021-06-02 NOTE — TELEPHONE ENCOUNTER
Prior Authorization Form:      DEMOGRAPHICS:                     Patient Name:  Claude Sanchez  Patient :  1950            Insurance:  Payor: Rivas Minor / Plan: [employer]US HMO / Product Type: *No Product type* /   Insurance ID Number:    Payor/Plan Subscr  Sex Relation Sub. Ins. ID Effective Group Num   1.  1212 [employer] MEDICARandenyoung Spannard* 786 Female Self [id number]                                    [address] [id number]         DIAGNOSIS & PROCEDURE:                       Procedure/Operation: EGD           CPT Code: 12528    Diagnosis:  HEARTBURN    ICD10 Code: R12    [facility]  [facility]    Surgeon:  DR. Cristiano Zavala    SCHEDULING INFORMATION:                          Date: 21    Time: 8:00 AM              Anesthesia:  LMAC                                                       Status:  OUTPATIENT      Special Comments:  N/A       Electronically signed by Mariah Macias on 2021 at 9:24 AM

## 2021-06-05 LAB — DIABETIC RETINOPATHY: NEGATIVE

## 2021-06-18 ENCOUNTER — HOSPITAL ENCOUNTER (OUTPATIENT)
Dept: GENERAL RADIOLOGY | Age: 71
Discharge: HOME | End: 2021-06-20
Payer: MEDICARE

## 2021-06-18 DIAGNOSIS — Z12.31 ENCOUNTER FOR SCREENING MAMMOGRAM FOR MALIGNANT NEOPLASM OF BREAST: ICD-10-CM

## 2021-06-18 PROCEDURE — 77063 BREAST TOMOSYNTHESIS BI: CPT

## 2021-06-30 ENCOUNTER — TELEPHONE (OUTPATIENT)
Dept: FAMILY MEDICINE CLINIC | Age: 71
End: 2021-06-30

## 2021-06-30 NOTE — PROGRESS NOTES
[address] PRE-ADMISSION TESTING ENDOSCOPY INSTRUCTIONS- [facility]-phone number:[phone]    ENDOSCOPY INSTRUCTIONS:   [] Bowel prep instructions reviewed. [x] Nothing by mouth after midnight, including gum, candy, mints, or water. Please follow your surgeons instructions if you are required to complete a bowel prep. Colonoscopy- no solid food-only clear liquids the day prior). [x] You may brush your teeth, gargle, but do NOT swallow water. [x] Do not wear makeup, lotions, powders, deodorant. Nail polish as directed by the nurse. [x] Arrange transportation with a responsible adult  to and from the hospital. If you do not have a responsible adult  to transport you, you will need to make arrangements with a medical transportation company (i.e. [employer]. A Uber/taxi/bus is not appropriate unless you are accompanied by a responsible adult ). Arrange for someone to be with you for the remainder of the day and for 24 hours after your procedure due to having had anesthesia. Who will be your  for transportation?______HUSBAND, MARK____________   Who will be staying with you for 24 hrs after your procedure?___MARK_______________    PARKING INSTRUCTIONS:   [x] Arrival Time:_______0700_________________  · [x] Parking lot  \"I\" OR 1 is located on [facility] (the corner of [facility]). To enter, press the button and the gate will lift. A free token will be provided to exit the lot. One car per patient is allowed to park in this lot. All other cars are to park on [address] [place] [address] either in the parking garage or the handicap lot. [] To reach the [facility] lobby from [address] [place] [address], upon entering the hospital, take elevator B to the 3rd floor. EDUCATION INSTRUCTIONS:  [x] Bring a complete list of your medications, please write the last time you took the medicine, give this list to the nurse.   [x] Take the following medications the morning of surgery with 1-2 ounces of water: SEE LIST  [x] Stop herbal supplements and vitamins 5 days before your surgery. [x] DO NOT take any diabetic medicine the morning of surgery. Follow instructions for insulin the day before surgery. [x] If you are diabetic and your blood sugar is low or you feel symptomatic, you may drink 1-2 ounces of apple juice or take a glucose tablet. The morning of your procedure, you may call the pre-op area if you have concerns about your blood sugar [phone]. [x] Use your inhalers the morning of surgery. Bring your emergency inhaler with you day of surgery. [x] Follow physician instructions regarding any blood thinners you may be taking. WHAT TO EXPECT:  [x] The day of your procedure you will be greeted and checked in by the [employer].  In addition, you will be registered in the [place] by a Patient Access Representative. Please bring your photo ID and insurance card. A nurse will greet you in accordance to the time you are needed in the pre-op area to prepare you for surgery. Please do not be discouraged if you are not greeted in the order you arrive as there are many variables that are involved in patient preparation. Your patience is greatly appreciated as you wait for your nurse. Please bring in items such as: books, magazines, newspapers, electronics, or any other items  to occupy your time in the waiting area. [x]  Delays may occur. Staff will make a sincere effort to keep you informed of delays. If any delays occur with your procedure, we apologize ahead of time for your inconvenience as we recognize the value of your time.

## 2021-07-01 ENCOUNTER — ANESTHESIA EVENT (OUTPATIENT)
Dept: ENDOSCOPY | Age: 71
End: 2021-07-01
Payer: MEDICARE

## 2021-07-02 ENCOUNTER — ANESTHESIA (OUTPATIENT)
Dept: ENDOSCOPY | Age: 71
End: 2021-07-02
Payer: MEDICARE

## 2021-07-02 ENCOUNTER — TELEPHONE (OUTPATIENT)
Dept: FAMILY MEDICINE CLINIC | Age: 71
End: 2021-07-02

## 2021-07-02 ENCOUNTER — HOSPITAL ENCOUNTER (OUTPATIENT)
Age: 71
Setting detail: OUTPATIENT SURGERY
Discharge: HOME | End: 2021-07-02
Attending: SURGERY | Admitting: SURGERY
Payer: MEDICARE

## 2021-07-02 VITALS
SYSTOLIC BLOOD PRESSURE: 90 MMHG | RESPIRATION RATE: 16 BRPM | DIASTOLIC BLOOD PRESSURE: 51 MMHG | OXYGEN SATURATION: 95 %

## 2021-07-02 VITALS
HEART RATE: 60 BPM | TEMPERATURE: 97.4 F | OXYGEN SATURATION: 97 % | DIASTOLIC BLOOD PRESSURE: 60 MMHG | RESPIRATION RATE: 16 BRPM | HEIGHT: 59 IN | BODY MASS INDEX: 34.27 KG/M2 | SYSTOLIC BLOOD PRESSURE: 112 MMHG | WEIGHT: 170 LBS

## 2021-07-02 DIAGNOSIS — Z01.812 PRE-OPERATIVE LABORATORY EXAMINATION: Primary | ICD-10-CM

## 2021-07-02 DIAGNOSIS — I10 ESSENTIAL HYPERTENSION: ICD-10-CM

## 2021-07-02 LAB — METER GLUCOSE: 119 MG/DL (ref 74–99)

## 2021-07-02 PROCEDURE — 2709999900 HC NON-CHARGEABLE SUPPLY: Performed by: SURGERY

## 2021-07-02 PROCEDURE — 88342 IMHCHEM/IMCYTCHM 1ST ANTB: CPT

## 2021-07-02 PROCEDURE — 88305 TISSUE EXAM BY PATHOLOGIST: CPT

## 2021-07-02 PROCEDURE — 82962 GLUCOSE BLOOD TEST: CPT

## 2021-07-02 PROCEDURE — 2580000003 HC RX 258: Performed by: SURGERY

## 2021-07-02 PROCEDURE — 6360000002 HC RX W HCPCS

## 2021-07-02 PROCEDURE — 7100000011 HC PHASE II RECOVERY - ADDTL 15 MIN: Performed by: SURGERY

## 2021-07-02 PROCEDURE — 3700000000 HC ANESTHESIA ATTENDED CARE: Performed by: SURGERY

## 2021-07-02 PROCEDURE — 7100000010 HC PHASE II RECOVERY - FIRST 15 MIN: Performed by: SURGERY

## 2021-07-02 PROCEDURE — 3700000001 HC ADD 15 MINUTES (ANESTHESIA): Performed by: SURGERY

## 2021-07-02 PROCEDURE — 43239 EGD BIOPSY SINGLE/MULTIPLE: CPT | Performed by: SURGERY

## 2021-07-02 PROCEDURE — 3609012400 HC EGD TRANSORAL BIOPSY SINGLE/MULTIPLE: Performed by: SURGERY

## 2021-07-02 RX ORDER — SODIUM CHLORIDE 9 MG/ML
INJECTION, SOLUTION INTRAVENOUS CONTINUOUS
Status: DISCONTINUED | OUTPATIENT
Start: 2021-07-02 | End: 2021-07-02 | Stop reason: HOSPADM

## 2021-07-02 RX ORDER — PROPOFOL 10 MG/ML
INJECTION, EMULSION INTRAVENOUS PRN
Status: DISCONTINUED | OUTPATIENT
Start: 2021-07-02 | End: 2021-07-02 | Stop reason: SDUPTHER

## 2021-07-02 RX ORDER — SODIUM CHLORIDE 0.9 % (FLUSH) 0.9 %
5-40 SYRINGE (ML) INJECTION PRN
Status: DISCONTINUED | OUTPATIENT
Start: 2021-07-02 | End: 2021-07-02 | Stop reason: HOSPADM

## 2021-07-02 RX ORDER — SODIUM CHLORIDE 9 MG/ML
25 INJECTION, SOLUTION INTRAVENOUS PRN
Status: DISCONTINUED | OUTPATIENT
Start: 2021-07-02 | End: 2021-07-02 | Stop reason: HOSPADM

## 2021-07-02 RX ORDER — SODIUM CHLORIDE 0.9 % (FLUSH) 0.9 %
5-40 SYRINGE (ML) INJECTION EVERY 12 HOURS SCHEDULED
Status: DISCONTINUED | OUTPATIENT
Start: 2021-07-02 | End: 2021-07-02 | Stop reason: HOSPADM

## 2021-07-02 RX ORDER — SUCRALFATE 1 G/1
1 TABLET ORAL 4 TIMES DAILY
Qty: 120 TABLET | Refills: 3 | Status: SHIPPED | OUTPATIENT
Start: 2021-07-02 | End: 2021-08-20

## 2021-07-02 RX ADMIN — PROPOFOL 130 MG: 10 INJECTION, EMULSION INTRAVENOUS at 07:54

## 2021-07-02 RX ADMIN — SODIUM CHLORIDE: 9 INJECTION, SOLUTION INTRAVENOUS at 07:44

## 2021-07-02 ASSESSMENT — PAIN SCALES - GENERAL
PAINLEVEL_OUTOF10: 0

## 2021-07-02 ASSESSMENT — PAIN - FUNCTIONAL ASSESSMENT: PAIN_FUNCTIONAL_ASSESSMENT: 0-10

## 2021-07-02 NOTE — ANESTHESIA PRE PROCEDURE
Department of Anesthesiology  Preprocedure Note       Name:  Sarah Guzman   Age:  70 y.o.  :  1950                                          MRN:  [id number]         Date:  2021      Surgeon: Darian De Anda):  Marek Ortiz MD    Procedure: Procedure(s):  EGD ESOPHAGOGASTRODUODENOSCOPY    Medications prior to admission:   Prior to Admission medications    Medication Sig Start Date End Date Taking?  Authorizing Provider   levothyroxine (SYNTHROID) 100 MCG tablet TAKE 1 TABLET EVERY DAY 21  Yes ROXY Chong CNP   pantoprazole (PROTONIX) 40 MG tablet Take 1 tablet by mouth 2 times daily 21 Yes ROXY Chong CNP   Alcohol Swabs (B-D SINGLE USE SWABS REGULAR) PADS USE AS DIRECTED AS NEEDED  TO TEST BLOOD SUGAR 21  Yes ROXY Chong CNP   venlafaxine (EFFEXOR XR) 150 MG extended release capsule TAKE 1 CAPSULE EVERY DAY 4/10/21  Yes ROXY Chong CNP   atenolol (TENORMIN) 25 MG tablet TAKE 1 TABLET EVERY DAY 4/10/21  Yes ROXY Chong CNP   Accu-Chek Softclix Lancets MISC TEST TWO TIMES DAILY 21  Yes ROXY Chong CNP   quinapril (ACCUPRIL) 5 MG tablet TAKE 1 TABLET EVERY NIGHT 21  Yes ROXY Chong CNP   simvastatin (ZOCOR) 80 MG tablet TAKE 1 TABLET EVERY NIGHT 21  Yes ROXY Chong CNP   VICTOZA 18 MG/3ML SOPN SC injection INJECT 1.8MG UNDER THE SKIN EVERY DAY 10/21/20  Yes ROXY Chong CNP   Insulin Pen Needle (DROPLET PEN NEEDLES) 31G X 6 MM MISC Inject 1 each into the skin 3 times daily 20  Yes ROXY Chong CNP   blood glucose monitor strips PT TESTS TWICE DAILY 20  Yes ROXY Chong CNP   acetaminophen (TYLENOL) 500 MG tablet Take 1 tablet by mouth 4 times daily as needed for Pain 20  Yes ROXY Chong CNP   Blood Glucose Monitoring Suppl (ACCU-CHEK STEVEN PLUS) w/Device KIT CHECK BLOOD SUGAR TWICE DAILY 18  Yes ROXY Kendall - CNP aspirin 81 MG EC tablet Take 1 tablet by mouth daily LD 12-4-19  Patient taking differently: Take 81 mg by mouth daily  12/16/19   ROXY Brantley CNP       Current medications:    Current Facility-Administered Medications   Medication Dose Route Frequency Provider Last Rate Last Admin    0.9 % sodium chloride infusion   Intravenous Continuous Ana Arceo MD        sodium chloride flush 0.9 % injection 5-40 mL  5-40 mL Intravenous 2 times per day Mershon Fitting, MD        sodium chloride flush 0.9 % injection 5-40 mL  5-40 mL Intravenous PRN Mershon Fitting, MD        0.9 % sodium chloride infusion  25 mL Intravenous PRN Mershon Fitting, MD           Allergies:  No Known Allergies    Problem List:    Patient Active Problem List   Diagnosis Code    Hypertension I10    Hyperlipidemia E78.5    Carotid artery stenosis I65.29    Type 2 diabetes mellitus without complication ([facility] Z90.0    Anxiety and depression F41.9, F32.9    Polyarthralgia M25.50    Arthritis of both knees M17.0    Swelling of left hand M79.89    Left hand pain M79.642    Thyroid nodule E04.1    Major depressive disorder, single episode, moderate ([address] F32.1       Past Medical History:        Diagnosis Date    Acute MI ([address] 07/11/08    CAD (coronary artery disease)     Dr. Amie Brandon Carotid artery occlusion     Diabetes mellitus ([facility]     Diverticulitis     Hyperlipidemia     Hypertension     Obese     Osteoarthritis     Thyroid nodule        Past Surgical History:        Procedure Laterality Date    CARPAL TUNNEL RELEASE      (L)    CARPAL TUNNEL RELEASE Right 12/13/2019    RIGHT CARPAL TUNNEL RELEASE ENDOSCOPIC performed by Janice Silver MD at [facility] CATH LAB PROCEDURE      stent placed     FINGER TRIGGER RELEASE Right 12/13/2019    RIGHT THUMB TRIGGER RELEASE performed by Janice Silver MD at [facility]  05/2017    LT knee torn meniscus / DR Shira Vigil  2017  THYROIDECTOMY N/A 1/16/2020    TOTAL THYROIDECTOMY--NERVE INTEGRITY MONITOR performed by Brenda Boyd DO at [facility] OR       Social History:    Social History     Tobacco Use    Smoking status: Former Smoker     Packs/day: 1.00     Years: 43.00     Pack years: 43.00     Start date: 1968     Quit date: 3/18/2011     Years since quitting: 10.2    Smokeless tobacco: Never Used   Substance Use Topics    Alcohol use: No                                Counseling given: Not Answered      Vital Signs (Current):   Vitals:    06/30/21 1142 07/02/21 0708   BP:  (!) 144/78   Pulse:  68   Resp:  18   Temp:  36.1 °C (97 °F)   TempSrc:  Temporal   SpO2:  95%   Weight: 170 lb (77.1 kg) 170 lb (77.1 kg)   Height: 4' 11\" (1.499 m) 4' 11\" (1.499 m)                                              BP Readings from Last 3 Encounters:   07/02/21 (!) 144/78   06/01/21 124/68   05/29/21 122/75       NPO Status: Time of last liquid consumption: 2230                        Time of last solid consumption: 1800                        Date of last liquid consumption: 07/01/21                        Date of last solid food consumption: 07/01/21    BMI:   Wt Readings from Last 3 Encounters:   07/02/21 170 lb (77.1 kg)   06/01/21 175 lb (79.4 kg)   04/27/21 175 lb (79.4 kg)     Body mass index is 34.34 kg/m².     CBC:   Lab Results   Component Value Date    WBC 6.4 04/19/2021    RBC 4.45 04/19/2021    HGB 13.3 04/19/2021    HCT 42.7 04/19/2021    MCV 96.0 04/19/2021    RDW 12.4 04/19/2021     04/19/2021       CMP:   Lab Results   Component Value Date     04/19/2021    K 5.2 04/19/2021     04/19/2021    CO2 23 04/19/2021    BUN 21 04/19/2021    CREATININE 0.8 04/19/2021    GFRAA >60 04/19/2021    LABGLOM >60 04/19/2021    GLUCOSE 130 04/19/2021    PROT 7.8 04/19/2021    CALCIUM 10.2 04/19/2021    BILITOT 0.2 04/19/2021    ALKPHOS 121 04/19/2021    AST 34 04/19/2021    ALT 39 04/19/2021       POC Tests: No results for

## 2021-07-02 NOTE — PROGRESS NOTES
Discharge instructions given and reviewed with patient. Patient & -- verbalizes understanding, no questions regarding care.

## 2021-07-02 NOTE — OP NOTE
Operative Note      Patient: Carmen Lyles  YOB: 1950  MRN: [id number]    Date of Procedure: 7/2/2021    Pre-Op Diagnosis: HEARTBURN    Post-Op Diagnosis: Same and severe gastritis       Procedure(s):  EGD BIOPSY    Surgeon(s):  Stephanie Damian MD    Assistant:   * No surgical staff found *    Anesthesia: Monitor Anesthesia Care    Estimated Blood Loss (mL): Minimal    Complications: None    Specimens:   ID Type Source Tests Collected by Time Destination   A : antral biopsy Tissue Stomach SURGICAL PATHOLOGY Stephanie Damian MD 7/2/2021 0757        Implants:  * No implants in log *      Drains:   Closed/Suction Drain Anterior Neck Bulb 15 Bulgarian (Active)       Findings: severe gastritis    Detailed Description of Procedure:   ESOPHAGOGASTRODUODENOSCOPY PROCEDURE NOTE  BRIEF HISTORY:  This is a 70 y.o. female who presents with the complaint of heartburn/reflux. It was recommended the patient undergo an esophagogastroduodenoscopy. The risks/benefits/alternatives/expected outcomes were explained the the patient. The patient verbalized understanding and agreed to proceed. PROCEDURE:  The patient was brought into the endoscopy suite and placed in the left lateral decubitus position. A bite block was placed in the patients mouth. After the initiation of LMAC anesthesia, a gastroscope was inserted into the patient's mouth and passed into the esophagus and into the stomach. Immediately upon entering the stomach the note of severe gastritis was made. The scope was advanced through the pylorus into the first and second portion of the duodenum making the note of normal duodenum. The scope was then withdrawn back into the stomach where it was retroflexed. There was no hiatal hernia noted. The scope was then straightened and antral biopsies were taken. The scope was then withdrawn the entire length of the esophagus, making the note of a normal esophagus without masses, ulcerations, or lesions noted. The scope was withdrawn entirely. The patient tolerated the procedure well and there were no complications.       Rajani Payne MD, MD  7/2/2021        Electronically signed by Rajani Payne MD on 7/2/2021 at 8:00 AM

## 2021-07-02 NOTE — H&P
Patient ID: Judy Romero is a 70 y.o. female. HPI  70 yr old female referred by Lucy Albarado for GERD symptoms. Pt reports she has had heartburn/reflux/indigestion. Has been worse over past several months. No relief with Protonix. No association with particular foods. No remitting factors. Pt does awaken from sleep with burning in throat and bad taste in back of mouth. Pt has coughed and gagged, but has not thrown up from this.        Past Medical History        Past Medical History:   Diagnosis Date    Acute MI ([address] 07/11/08    CAD (coronary artery disease)       Dr. Charmaine Blanchard Carotid artery occlusion      Diabetes mellitus ([facility]      Diverticulitis      Hyperlipidemia      Hypertension      Obese      Osteoarthritis      Thyroid nodule              Past Surgical History         Past Surgical History:   Procedure Laterality Date    CARPAL TUNNEL RELEASE         (L)    CARPAL TUNNEL RELEASE Right 12/13/2019     RIGHT CARPAL TUNNEL RELEASE ENDOSCOPIC performed by Jenny Garcia MD at [address] [place] [address] CATH LAB PROCEDURE         stent placed     FINGER TRIGGER RELEASE Right 12/13/2019     RIGHT THUMB TRIGGER RELEASE performed by Jenny Garcia MD at [address] [place] [address]   05/2017     LT knee torn meniscus / DR Pk Ordonez  2017    THYROIDECTOMY N/A 1/16/2020     TOTAL THYROIDECTOMY--NERVE INTEGRITY MONITOR performed by Marian Siddiqi DO at [facility] OR            Current Facility-Administered Medications          Current Outpatient Medications   Medication Sig Dispense Refill    levothyroxine (SYNTHROID) 100 MCG tablet TAKE 1 TABLET EVERY DAY 90 tablet 1    pantoprazole (PROTONIX) 40 MG tablet Take 1 tablet by mouth 2 times daily 180 tablet 1    Alcohol Swabs (B-D SINGLE USE SWABS REGULAR) PADS USE AS DIRECTED AS NEEDED  TO TEST BLOOD SUGAR 100 each 5    venlafaxine (EFFEXOR XR) 150 MG extended release capsule TAKE 1 CAPSULE EVERY DAY 90 capsule 3    atenolol (TENORMIN) 25 MG tablet TAKE 1 TABLET EVERY DAY 90 tablet 3    Accu-Chek Softclix Lancets MISC TEST TWO TIMES DAILY 200 each 2    quinapril (ACCUPRIL) 5 MG tablet TAKE 1 TABLET EVERY NIGHT 90 tablet 2    simvastatin (ZOCOR) 80 MG tablet TAKE 1 TABLET EVERY NIGHT 90 tablet 2    VICTOZA 18 MG/3ML SOPN SC injection INJECT 1.8MG UNDER THE SKIN EVERY DAY 27 mL 5    Insulin Pen Needle (DROPLET PEN NEEDLES) 31G X 6 MM MISC Inject 1 each into the skin 3 times daily 100 each 5    blood glucose monitor strips PT TESTS TWICE DAILY 200 strip 5    acetaminophen (TYLENOL) 500 MG tablet Take 1 tablet by mouth 4 times daily as needed for Pain 360 tablet 1    aspirin 81 MG EC tablet Take 1 tablet by mouth daily LD 12-4-19 (Patient taking differently: Take 81 mg by mouth daily ) 90 tablet 1    Blood Glucose Monitoring Suppl (ACCU-CHEK STEVEN PLUS) w/Device KIT CHECK BLOOD SUGAR TWICE DAILY 1 kit 0      No current facility-administered medications for this visit.             No Known Allergies     Family History         Family History   Problem Relation Age of Onset    Heart Disease Mother      Heart Disease Father      Diabetes Father      Heart Disease Sister      Heart Disease Maternal Grandmother      Heart Disease Paternal Grandmother      Diabetes Paternal Grandmother              Social History               Socioeconomic History    Marital status:        Spouse name: Not on file    Number of children: Not on file    Years of education: Not on file    Highest education level: Not on file   Occupational History    Occupation: retired    Tobacco Use    Smoking status: Former Smoker       Packs/day: 1.00       Years: 43.00       Pack years: 43.00       Start date: 1968       Quit date: 3/18/2011       Years since quitting: 10.2    Smokeless tobacco: Never Used   Vaping Use    Vaping Use: Never used   Substance and Sexual Activity    Alcohol use: No    Drug use: No    Sexual activity: Yes   Other Topics Concern    Not on file   Social History Narrative    Not on file      Social Determinants of Health          Financial Resource Strain:     Difficulty of Paying Living Expenses:    Food Insecurity:     Worried About Running Out of Food in the Last Year:     [address] in the Last Year:    Transportation Needs:     Lack of Transportation (Medical):  Lack of Transportation (Non-Medical):    Physical Activity:     Days of Exercise per Week:     Minutes of Exercise per Session:    Stress:     Feeling of Stress :    Social Connections:     Frequency of Communication with Friends and Family:     Frequency of Social Gatherings with Friends and Family:     Attends [employer] Services:     Active Member of Clubs or Organizations:     Attends Club or Organization Meetings:     Marital Status:    Intimate Partner Violence:     Fear of Current or Ex-Partner:     Emotionally Abused:     Physically Abused:     Sexually Abused:             Review of Systems   Constitutional: Negative for activity change, appetite change, chills, fever and unexpected weight change. HENT: Negative. Eyes: Negative. Respiratory: Negative for cough, choking, chest tightness, shortness of breath and wheezing. Cardiovascular: Negative for chest pain, palpitations and leg swelling. Gastrointestinal: Negative for abdominal distention, abdominal pain, anal bleeding, blood in stool, constipation, diarrhea, nausea and vomiting. Endocrine: Negative for cold intolerance, heat intolerance, polydipsia and polyuria. Genitourinary: Negative for dysuria, frequency, hematuria, urgency, vaginal bleeding, vaginal discharge and vaginal pain. Musculoskeletal: Positive for arthralgias, back pain, gait problem, joint swelling and myalgias. Negative for neck pain and neck stiffness. Skin: Negative for color change, pallor, rash and wound.    Allergic/Immunologic: Negative for environmental allergies and food allergies. Neurological: Negative for dizziness, seizures, syncope, weakness, light-headedness, numbness and headaches. Hematological: Negative for adenopathy. Does not bruise/bleed easily. Psychiatric/Behavioral: Negative for agitation, confusion, decreased concentration, hallucinations, self-injury and suicidal ideas. The patient is not nervous/anxious and is not hyperactive. Objective:   Physical Exam  Constitutional:       General: She is not in acute distress. Appearance: Normal appearance. She is not ill-appearing or toxic-appearing. HENT:      Head: Normocephalic and atraumatic. Nose: Nose normal.      Mouth/Throat:      Mouth: Mucous membranes are moist.   Eyes:      General: No scleral icterus. Right eye: No discharge. Left eye: No discharge. Extraocular Movements: Extraocular movements intact. Conjunctiva/sclera: Conjunctivae normal.      Pupils: Pupils are equal, round, and reactive to light. Cardiovascular:      Rate and Rhythm: Normal rate and regular rhythm. Heart sounds: Normal heart sounds. No murmur heard. Pulmonary:      Effort: Pulmonary effort is normal. No respiratory distress. Breath sounds: Normal breath sounds. No stridor. No wheezing, rhonchi or rales. Abdominal:      General: Bowel sounds are normal. There is no distension. Palpations: Abdomen is soft. There is no mass. Tenderness: There is no abdominal tenderness. There is no guarding or rebound. Hernia: No hernia is present. Musculoskeletal:         General: Normal range of motion. Cervical back: Normal range of motion and neck supple. Skin:     General: Skin is warm and dry. Neurological:      General: No focal deficit present. Mental Status: She is alert and oriented to person, place, and time. Psychiatric:         Mood and Affect: Mood normal.         Behavior: Behavior normal.         Thought Content:  Thought content normal. Judgment: Judgment normal.            Assessment:   Heartburn  Indigestion  Burning reflux                Plan:   Recommend EGD with possible biopsy. The patient was explained the risks/benefits/alternatives/expected outcomes of the procedure. The patient was explained the risks of the procedure, including, but not limited to, the risk of reaction to the anesthesia medicine and the risk of perforation requiring further surgery. All questions were answered. The patient verbalized understanding and agreed to proceed.                  Mandi Delcid MD      UPDATED 7/2/21  History and physical unchanged  For EGD    Mandi Delcid MD, FACS  7/2/2021  7:11 AM

## 2021-07-02 NOTE — TELEPHONE ENCOUNTER
Fax received from [address] for refills on famotidine 20mg and atenolol 50mg. I spoke with patient about these meds because we do not have them on her med list.   She was on the famotidine in the past but never stopped it when the pantoprazole was started. Her atenolol is 25mg but she stated she was on 50mg in the past and has been taking 2 of the 25mg tablets. Please advise.

## 2021-07-05 RX ORDER — ATENOLOL 50 MG/1
50 TABLET ORAL DAILY
Qty: 90 TABLET | Refills: 1 | Status: SHIPPED
Start: 2021-07-05 | End: 2021-12-08 | Stop reason: SDUPTHER

## 2021-07-05 NOTE — ANESTHESIA POSTPROCEDURE EVALUATION
Department of Anesthesiology  Postprocedure Note    Patient: Raymond Bejarano  MRN: [id number]  YOB: 1950  Date of evaluation: 7/5/2021  Time:  11:59 AM     Procedure Summary     Date: 07/02/21 Room / Location: [facility] / [facility]    Anesthesia Start: 5500 Anesthesia Stop: 0801    Procedure: EGD BIOPSY (N/A ) Diagnosis: (HEARTBURN)    Surgeons: Hakeem Salas MD Responsible Provider: Rg Eaton MD    Anesthesia Type: MAC ASA Status: 3          Anesthesia Type: MAC    Win Phase I: Win Score: 10    Win Phase II: Win Score: 10    Last vitals: Reviewed and per EMR flowsheets.        Anesthesia Post Evaluation    Patient location during evaluation: PACU  Patient participation: complete - patient participated  Level of consciousness: awake and alert  Airway patency: patent  Nausea & Vomiting: no nausea and no vomiting  Complications: no  Cardiovascular status: hemodynamically stable and blood pressure returned to baseline  Respiratory status: acceptable  Hydration status: euvolemic

## 2021-07-07 ENCOUNTER — OFFICE VISIT (OUTPATIENT)
Dept: FAMILY MEDICINE CLINIC | Age: 71
End: 2021-07-07
Payer: MEDICARE

## 2021-07-07 VITALS
WEIGHT: 174.4 LBS | SYSTOLIC BLOOD PRESSURE: 118 MMHG | RESPIRATION RATE: 16 BRPM | HEART RATE: 74 BPM | TEMPERATURE: 98.1 F | HEIGHT: 59 IN | BODY MASS INDEX: 35.16 KG/M2 | OXYGEN SATURATION: 94 % | DIASTOLIC BLOOD PRESSURE: 74 MMHG

## 2021-07-07 DIAGNOSIS — D22.30 CHANGE IN FACIAL MOLE: ICD-10-CM

## 2021-07-07 DIAGNOSIS — B37.9 YEAST INFECTION: Primary | ICD-10-CM

## 2021-07-07 DIAGNOSIS — K14.8 TONGUE LESION: Primary | ICD-10-CM

## 2021-07-07 PROCEDURE — 1036F TOBACCO NON-USER: CPT | Performed by: NURSE PRACTITIONER

## 2021-07-07 PROCEDURE — 3017F COLORECTAL CA SCREEN DOC REV: CPT | Performed by: NURSE PRACTITIONER

## 2021-07-07 PROCEDURE — 1090F PRES/ABSN URINE INCON ASSESS: CPT | Performed by: NURSE PRACTITIONER

## 2021-07-07 PROCEDURE — 1123F ACP DISCUSS/DSCN MKR DOCD: CPT | Performed by: NURSE PRACTITIONER

## 2021-07-07 PROCEDURE — 99214 OFFICE O/P EST MOD 30 MIN: CPT | Performed by: NURSE PRACTITIONER

## 2021-07-07 PROCEDURE — G8417 CALC BMI ABV UP PARAM F/U: HCPCS | Performed by: NURSE PRACTITIONER

## 2021-07-07 PROCEDURE — 4040F PNEUMOC VAC/ADMIN/RCVD: CPT | Performed by: NURSE PRACTITIONER

## 2021-07-07 PROCEDURE — G8399 PT W/DXA RESULTS DOCUMENT: HCPCS | Performed by: NURSE PRACTITIONER

## 2021-07-07 PROCEDURE — G8427 DOCREV CUR MEDS BY ELIG CLIN: HCPCS | Performed by: NURSE PRACTITIONER

## 2021-07-08 ENCOUNTER — TELEPHONE (OUTPATIENT)
Dept: SURGERY | Age: 71
End: 2021-07-08

## 2021-07-08 RX ORDER — AMOXICILLIN 500 MG/1
1000 CAPSULE ORAL 2 TIMES DAILY
Qty: 56 CAPSULE | Refills: 0 | Status: SHIPPED | OUTPATIENT
Start: 2021-07-08 | End: 2021-07-22

## 2021-07-08 RX ORDER — CLARITHROMYCIN 500 MG/1
500 TABLET, COATED ORAL 2 TIMES DAILY
Qty: 28 TABLET | Refills: 0 | Status: SHIPPED | OUTPATIENT
Start: 2021-07-08 | End: 2021-07-22

## 2021-07-08 NOTE — TELEPHONE ENCOUNTER
MA received a VM from Terris Canavan at [facility] DR JOSE C DYER in regards to a prescription that was sent in electronically. Pt takes Simvastatin and there is a drug interaction to Biaxin that was called in. Terris Canavan was wondering if ATX was going to be changed if patient is to hold Simvastatin or its ok for patient to take both together. Terris Canavan can be reached at [phone]. MA routing message to  for further assistance and CHOLO Bradley for informational purposes.          Electronically signed by Natalia To MA on 7/8/21 at 4:02 PM EDT

## 2021-07-09 ENCOUNTER — TELEPHONE (OUTPATIENT)
Dept: FAMILY MEDICINE CLINIC | Age: 71
End: 2021-07-09

## 2021-07-09 ENCOUNTER — TELEPHONE (OUTPATIENT)
Dept: SURGERY | Age: 71
End: 2021-07-09

## 2021-07-09 NOTE — TELEPHONE ENCOUNTER
MA left a message for pt to return call to inform her of Dr. Tasha Riggs advisement and to check with her PCP regarding simvastatin, MA awaiting return call.   Electronically signed by Radha Long on 7/9/21 at 9:32 AM EDT

## 2021-07-09 NOTE — TELEPHONE ENCOUNTER
----- Message from Maria Luisa Claudio MD sent at 7/9/2021  6:22 AM EDT -----  Pt should check with her PCP regarding holding the simvastatin. She needs to take the Abx for her H.pylori.

## 2021-07-09 NOTE — TELEPHONE ENCOUNTER
Pt states that Dr. Son Ra prescribed Clarithromycin and it interacts with Simvastatin. What would you like pt to take in place of simvastatin.   Please advise

## 2021-07-09 NOTE — TELEPHONE ENCOUNTER
Pt returned MA's call, MA informed pt of Hpylori infection as well as medications called in and that there is an interaction with her simvastatin, patient is going to call her PCP and see if she can either hold simvastatin while taking antibiotics, or if she can take them together. Pt is picking up antibiotics today. MA verbalized understanding. Pharmacy also informed.   Electronically signed by Erick Hay on 7/9/21 at 10:57 AM EDT

## 2021-07-09 NOTE — TELEPHONE ENCOUNTER
----- Message from Arianne Winslow MD sent at 7/9/2021  6:22 AM EDT -----  Pt should check with her PCP regarding holding the simvastatin. She needs to take the Abx for her H.pylori.

## 2021-07-11 NOTE — TELEPHONE ENCOUNTER
Can you let her know she can do one of 2 things: continue to take it, as short term & risks vs benefits or 2 she may stop it while taking the antibiotic.

## 2021-07-14 RX ORDER — FLUCONAZOLE 100 MG/1
100 TABLET ORAL DAILY
Qty: 7 TABLET | Refills: 0 | Status: SHIPPED
Start: 2021-07-14 | End: 2021-09-30 | Stop reason: SDUPTHER

## 2021-07-21 ENCOUNTER — TELEPHONE (OUTPATIENT)
Dept: SURGERY | Age: 71
End: 2021-07-21

## 2021-07-21 DIAGNOSIS — E11.9 TYPE 2 DIABETES MELLITUS WITHOUT COMPLICATION, WITHOUT LONG-TERM CURRENT USE OF INSULIN (HCC): ICD-10-CM

## 2021-07-21 NOTE — TELEPHONE ENCOUNTER
MA contacted patient to inform of advisement. Patient verbalized understanding. Patient questioned about 6 wk repeat EGD previously discussed. MA informed patient that per Riya Mccoy MA discussing date to perform scopes that work with Dr Fontaine Hands schedule. Patient will be contacted soon to schedule. Patient verbalized understanding.      Electronically signed by Darci Caldwell on 7/21/21 at 3:51 PM EDT

## 2021-07-22 RX ORDER — BLOOD SUGAR DIAGNOSTIC
STRIP MISCELLANEOUS
Qty: 200 STRIP | Refills: 5 | Status: SHIPPED
Start: 2021-07-22 | End: 2022-05-05

## 2021-07-22 NOTE — TELEPHONE ENCOUNTER
Last Appointment:  7/7/2021  Future Appointments   Date Time Provider Radha Vargas   8/6/2021 10:30 AM Herminia Stearns MD Plastics [facility]   10/19/2021  9:00 AM Rehan Sebastian, ROXY - CNP [facility]   4/25/2022  9:00 AM Rehan Sebastian, ROXY - CNP [facility]   4/25/2023 10:30 AM [facility] VAS US RM 1 SEYZ CARDIO Jewel Scott   4/25/2023 11:00 AM Ayse Ortega MD VASC/MED [facility]

## 2021-08-05 ENCOUNTER — PATIENT MESSAGE (OUTPATIENT)
Dept: FAMILY MEDICINE CLINIC | Age: 71
End: 2021-08-05

## 2021-08-06 ENCOUNTER — OFFICE VISIT (OUTPATIENT)
Dept: SURGERY | Age: 71
End: 2021-08-06
Payer: MEDICARE

## 2021-08-06 VITALS
WEIGHT: 173 LBS | SYSTOLIC BLOOD PRESSURE: 120 MMHG | RESPIRATION RATE: 20 BRPM | HEART RATE: 74 BPM | HEIGHT: 59 IN | TEMPERATURE: 97.3 F | BODY MASS INDEX: 34.88 KG/M2 | OXYGEN SATURATION: 95 % | DIASTOLIC BLOOD PRESSURE: 68 MMHG

## 2021-08-06 DIAGNOSIS — L98.9 SKIN LESION: Primary | ICD-10-CM

## 2021-08-06 PROCEDURE — G8399 PT W/DXA RESULTS DOCUMENT: HCPCS | Performed by: PHYSICIAN ASSISTANT

## 2021-08-06 PROCEDURE — 3017F COLORECTAL CA SCREEN DOC REV: CPT | Performed by: PHYSICIAN ASSISTANT

## 2021-08-06 PROCEDURE — 1036F TOBACCO NON-USER: CPT | Performed by: PHYSICIAN ASSISTANT

## 2021-08-06 PROCEDURE — 99213 OFFICE O/P EST LOW 20 MIN: CPT | Performed by: PHYSICIAN ASSISTANT

## 2021-08-06 PROCEDURE — 1123F ACP DISCUSS/DSCN MKR DOCD: CPT | Performed by: PHYSICIAN ASSISTANT

## 2021-08-06 PROCEDURE — 1090F PRES/ABSN URINE INCON ASSESS: CPT | Performed by: PHYSICIAN ASSISTANT

## 2021-08-06 PROCEDURE — 4040F PNEUMOC VAC/ADMIN/RCVD: CPT | Performed by: PHYSICIAN ASSISTANT

## 2021-08-06 PROCEDURE — G8417 CALC BMI ABV UP PARAM F/U: HCPCS | Performed by: PHYSICIAN ASSISTANT

## 2021-08-06 PROCEDURE — 11102 TANGNTL BX SKIN SINGLE LES: CPT | Performed by: PHYSICIAN ASSISTANT

## 2021-08-06 PROCEDURE — G8427 DOCREV CUR MEDS BY ELIG CLIN: HCPCS | Performed by: PHYSICIAN ASSISTANT

## 2021-08-06 RX ORDER — LIDOCAINE HYDROCHLORIDE AND EPINEPHRINE 10; 10 MG/ML; UG/ML
3 INJECTION, SOLUTION INFILTRATION; PERINEURAL ONCE
Status: COMPLETED | OUTPATIENT
Start: 2021-08-06 | End: 2021-08-06

## 2021-08-06 RX ADMIN — LIDOCAINE HYDROCHLORIDE AND EPINEPHRINE 3 ML: 10; 10 INJECTION, SOLUTION INFILTRATION; PERINEURAL at 11:07

## 2021-08-06 NOTE — TELEPHONE ENCOUNTER
Talked with Patient, took Imodium yesterday. Bloody stools. Questions food poisoning, though waited until had it 6 days until taking it. She is well aware of red-flag items & will continue conservative methods at this time. She admits to being in the \"donut hole\" with insurance at this time & has a $700 balance before she can climb out. She has been managing her diabetes with Victoza. Will try to have samples provided from office. Contact [facility] for insurance/prescription options.

## 2021-08-06 NOTE — PROGRESS NOTES
Department of Plastic Surgery - Adult  Attending Consult Note      CHIEF COMPLAINT:  Lesion of left face     History Obtained From:  patient    HISTORY OF PRESENT ILLNESS:                The patient is a 70 y.o. female who presents with left mid face suspicious lesion. The patient states that she they first noticed this lesion several years ago. She notes that it has never completely healed and will have a sandpaperlike texture with some changing brown pigment centrally. She is referred to our office by her primary care provider. She has never had this area biopsied in the past.  She states she does take an 81 mg aspirin daily and has her diabetes well controlled with her last hemoglobin A1c being around 5-1/2.         Past Medical History:    Past Medical History:   Diagnosis Date    Acute MI ([facility] 07/11/08    CAD (coronary artery disease)     Dr. Lamin Ruelas Carotid artery occlusion     Diabetes mellitus ([facility]     Diverticulitis     Hyperlipidemia     Hypertension     Obese     Osteoarthritis     Thyroid nodule      Past Surgical History:    Past Surgical History:   Procedure Laterality Date    CARPAL TUNNEL RELEASE      (L)    CARPAL TUNNEL RELEASE Right 12/13/2019    RIGHT CARPAL TUNNEL RELEASE ENDOSCOPIC performed by Trever Gaona MD at [facility] CATH LAB PROCEDURE      stent placed    [place] Right 12/13/2019    RIGHT THUMB TRIGGER RELEASE performed by Trever Gaona MD at [facility]  05/2017    LT knee torn meniscus / DR Espinal  2017    THYROIDECTOMY N/A 01/16/2020    TOTAL THYROIDECTOMY--NERVE INTEGRITY MONITOR performed by Shruthi Cha DO at [address]  07/02/2021    severe gastritis--jose angel    UPPER GASTROINTESTINAL ENDOSCOPY N/A 7/2/2021    EGD BIOPSY performed by Quita Acevedo MD at [facility] ENDOSCOPY     Current Medications:      Current Outpatient Medications   Medication Sig Dispense Refill    blood glucose test strips (ACCU-CHEK STEVEN PLUS) strip TEST TWO TIMES DAILY 200 strip 5    atenolol (TENORMIN) 50 MG tablet Take 1 tablet by mouth daily 90 tablet 1    sucralfate (CARAFATE) 1 GM tablet Take 1 tablet by mouth 4 times daily 120 tablet 3    levothyroxine (SYNTHROID) 100 MCG tablet TAKE 1 TABLET EVERY DAY 90 tablet 1    pantoprazole (PROTONIX) 40 MG tablet Take 1 tablet by mouth 2 times daily 180 tablet 1    Alcohol Swabs (B-D SINGLE USE SWABS REGULAR) PADS USE AS DIRECTED AS NEEDED  TO TEST BLOOD SUGAR 100 each 5    venlafaxine (EFFEXOR XR) 150 MG extended release capsule TAKE 1 CAPSULE EVERY DAY 90 capsule 3    Accu-Chek Softclix Lancets MISC TEST TWO TIMES DAILY 200 each 2    quinapril (ACCUPRIL) 5 MG tablet TAKE 1 TABLET EVERY NIGHT 90 tablet 2    simvastatin (ZOCOR) 80 MG tablet TAKE 1 TABLET EVERY NIGHT 90 tablet 2    VICTOZA 18 MG/3ML SOPN SC injection INJECT 1.8MG UNDER THE SKIN EVERY DAY 27 mL 5    Insulin Pen Needle (DROPLET PEN NEEDLES) 31G X 6 MM MISC Inject 1 each into the skin 3 times daily 100 each 5    acetaminophen (TYLENOL) 500 MG tablet Take 1 tablet by mouth 4 times daily as needed for Pain 360 tablet 1    aspirin 81 MG EC tablet Take 1 tablet by mouth daily LD 12-4-19 (Patient taking differently: Take 81 mg by mouth daily ) 90 tablet 1    Blood Glucose Monitoring Suppl (ACCU-CHEK STEVEN PLUS) w/Device KIT CHECK BLOOD SUGAR TWICE DAILY 1 kit 0     No current facility-administered medications for this visit. Allergies:  Patient has no known allergies.     Social History:   Social History     Socioeconomic History    Marital status:      Spouse name: Not on file    Number of children: Not on file    Years of education: Not on file    Highest education level: Not on file   Occupational History    Occupation: retired    Tobacco Use    Smoking status: Former Smoker     Packs/day: 1.00     Years: 43.00     Pack years: 43.00 Start date: 26     Quit date: 3/18/2011     Years since quitting: 10.3    Smokeless tobacco: Never Used   Vaping Use    Vaping Use: Never used   Substance and Sexual Activity    Alcohol use: No    Drug use: No    Sexual activity: Yes   Other Topics Concern    Not on file   Social History Narrative    Not on file     Social Determinants of Health     Financial Resource Strain:     Difficulty of Paying Living Expenses:    Food Insecurity:     Worried About Running Out of Food in the Last Year:     [address] in the Last Year:    Transportation Needs:     Lack of Transportation (Medical):  Lack of Transportation (Non-Medical):    Physical Activity:     Days of Exercise per Week:     Minutes of Exercise per Session:    Stress:     Feeling of Stress :    Social Connections:     Frequency of Communication with Friends and Family:     Frequency of Social Gatherings with Friends and Family:     Attends Buddhism Services:     Active Member of Clubs or Organizations:     Attends Club or Organization Meetings:     Marital Status:    Intimate Partner Violence:     Fear of Current or Ex-Partner:     Emotionally Abused:     Physically Abused:     Sexually Abused:      Family History:   Family History   Problem Relation Age of Onset    Heart Disease Mother     Heart Disease Father     Diabetes Father     Heart Disease Sister     Heart Disease Maternal Grandmother     Heart Disease Paternal Grandmother     Diabetes Paternal Grandmother        REVIEW OF SYSTEMS:    CONSTITUTIONAL:  negative for  fevers, chills, sweats and fatigue  EYES: negative for dipolpia or acute vision loss. RESPIRATORY:  negative for  dry cough, cough with sputum, dyspnea, wheezing and chest pain  HENT:negative for pain, headache, difficulty swallowing or nose bleeds.   CARDIOVASCULAR:  negative for  chest pain, dyspnea, palpitations, syncope  GASTROINTESTINAL:  negative for nausea, vomiting, change in bowel habits, diarrhea, constipation and abdominal pain  EXTREMITIES: negative for edema  MUSCULOSKELETAL: negative for muscle weakness  SKIN: positive for lesion,negative for itching or rashes. HEME: negative for easy brusing or bleeding  BEHAVIOR/PSYCH:  negative for poor appetite, increased appetite, decreased sleep and poor concentration       PHYSICAL EXAM:          VITALS:  /68 (Site: Left Upper Arm, Position: Sitting, Cuff Size: Medium Adult)   Pulse 74   Temp 97.3 °F (36.3 °C) (Skin)   Resp 20   Ht 4' 11\" (1.499 m)   Wt 173 lb (78.5 kg)   SpO2 95%   BMI 34.94 kg/m²   CONSTITUTIONAL:  awake, alert, cooperative, no apparent distress, and appears stated age  EYES: PERRLA, EOMI, no signs of occular infection  LUNGS:  No increased work of breathing, good air exchange  CARDIOVASCULAR:  regular rate and rhythm   EXTREMITIES: no signs of clubbing or cyanosis. MUSCULOSKELETAL: negative for flaccid muscle tone or spastic movements. NEURO: Cranial nerves II-XII grossly intact. No signs of agitated mood. SKIN: Left mid face suspicious scabbing lesion-  7mm x 6mm, skin tone with variegated pigmentation brown centralized , irregular border, nonraised, no signs of bleeding,drainage or infection. Non tender to palpation.   Concerns for actinic keratosis        DATA:    Labs: CBC:   Lab Results   Component Value Date    WBC 6.4 04/19/2021    RBC 4.45 04/19/2021    HGB 13.3 04/19/2021    HCT 42.7 04/19/2021    MCV 96.0 04/19/2021    MCH 29.9 04/19/2021    MCHC 31.1 04/19/2021    RDW 12.4 04/19/2021     04/19/2021    MPV 9.8 04/19/2021     BMP:    Lab Results   Component Value Date     04/19/2021    K 5.2 04/19/2021     04/19/2021    CO2 23 04/19/2021    BUN 21 04/19/2021    LABALBU 4.9 04/19/2021    CREATININE 0.8 04/19/2021    CALCIUM 10.2 04/19/2021    GFRAA >60 04/19/2021    LABGLOM >60 04/19/2021    GLUCOSE 130 04/19/2021       Radiology Review:  No radiology needed at this time  Pathology Review: No Pathology reviewed       IMPRESSION/RECOMMENDATIONS:        Diagnosis  -) Left mid face suspicious lesion of uncertain behavior    -We will plan to proceed and have the Left mid face suspicious lesion of uncertain behavior biopsied.    -The risks, benefits and options were discussed with the pt. The risks included but not limited to pain, bleeding, infection, heavy scarring, damage to surrounding structures, fluid collections, asymmetry, and need for further procedures. All of Her questions were answered to their satisfaction and She agrees to proceed with the procedure.    -After consent was obtained, the area was cleansed with an alcohol swab. Local anesthesia consisting of 1% lidocaine with 1:100,000 epinepherine was injected  surrounding the area. The local was allowed to work. Using a 10-blade the lesion was shaved, hemostasis was obtained and a bandage was placed. The procedure was performed by me. I have discussed with the patient that they should make every attempt to follow-up within this time interval in the event that the pathology or radiographic findings require further attention such as surgical or other medical intervention. They voiced complete understanding. The patient was educated to keep the bandage in place and apply bacitracin to the wound site BID. OK to shower at this time. Advised the patient that they can allow soap and water to rinse of the wound site while showering. Once they are done in the shower they are to pat dry the incision site with a clean paper towel. No baths, hot tubs or soaking of the wound site at this time. Pt voices understanding. Photos obtained. Chaperone present for entire visit. FU- 7-14 days      This document is generated, in part, by voice recognition software and thus  syntax and grammatical errors are possible.     Celso Shaverma  10:47 AM  8/6/2021

## 2021-08-06 NOTE — TELEPHONE ENCOUNTER
From: Kevin Walsh  To: Robina Lopez APRN - CNP  Sent: 8/5/2021 4:03 PM EDT  Subject: Non-Urgent Medical Question    Sorry to bother you but I've had diarrhea for six days and nights. I took a couple of Imodium today but so far it's only helping a little. I don't know what else to do. Also tried the brat diet. (Didn't help). I can't come in because I can't get too far from my bathroom. So what do you recommend. ? So many messages you're probably ready to give me away to some other doctor!!!

## 2021-08-16 ENCOUNTER — VIRTUAL VISIT (OUTPATIENT)
Dept: SURGERY | Age: 71
End: 2021-08-16

## 2021-08-16 DIAGNOSIS — L57.0 AK (ACTINIC KERATOSIS): Primary | ICD-10-CM

## 2021-08-16 PROCEDURE — 99024 POSTOP FOLLOW-UP VISIT: CPT | Performed by: PHYSICIAN ASSISTANT

## 2021-08-16 NOTE — PROGRESS NOTES
Jean Dudley is a 70 y.o. female evaluated via telephone on 8/16/2021. Consent:  She and/or health care decision maker is aware that that she may receive a bill for this telephone service, depending on her insurance coverage, and has provided verbal consent to proceed: Yes    The patient was in the state of [place] during the entirety of the phone conversation. Documentation:  I communicated with the patient and/or health care decision maker about the pathology results from their most recent biopsy.    Details of this discussion including any medical advice provided:         I explained to the patient today that her pathology results which are Actinic Keratosis    [facility]   [address] [facility], [address] [facility] [address] [place], [address] [facility], [address] [place] [address]               FINAL SURGICAL PATHOLOGY REPORT     NAME:           Gilbert Walker      Date of       08/06/2021                                            Collection:   Medical Record   [id number]              Date of       08/09/2021   Number:                                  Receipt:   Age:  76 Y        Sex:  F                Date          08/12/2021 14:35                                            Reported:   Date Of Birth:   1950   Financial        [id number]            Admitting     ANNELISE GARCIA   Number:                                  Physician:   Patient          HEWMARTA GAMEZ   Location:                                Physician:    TIKI     Accession Number:  [id number]                                           Additional Physicians:ELLI BELLAMY Diagnosis:   Skin lesion, left face, shave biopsy: Actinic keratosis. Comment:   Intradepartmental consultation is obtained.                                                Demarcus Sarmiento M.D.                                        (Electronic Signature)         I informed the patient that we will need to plan a laser procedure in the near future to excise this lesion. I informed the patient that they will need to schedule an office visit to be seen by Dr. Ryley Barclay. I explained to her today the laser procedure she had no questions or concerns. We will plan to call and schedule her accordingly      I informed the patient in the meantime to continue covering the biopsy site with a gauze pad or bandage until seen in our office. I affirm this is a Patient Initiated Episode with a Patient who has not had a related appointment within my department in the past 7 days or scheduled within the next 24 hours. Patient identification was verified at the start of the visit: Yes    Total Time: minutes: <5 minutes (not billable)    The visit was conducted pursuant to the emergency declaration under the [address] [place] [address] authority and the [employer] Act. Patient identification was verified, and a caregiver was present when appropriate. The patient was located in a state where the provider was credentialed to provide care.     Note: not billable if this call serves to triage the patient into an appointment for the relevant concern    Follow-up day of procedure    MARGARETH Walton

## 2021-08-20 ENCOUNTER — PATIENT MESSAGE (OUTPATIENT)
Dept: FAMILY MEDICINE CLINIC | Age: 71
End: 2021-08-20

## 2021-08-20 ENCOUNTER — HOSPITAL ENCOUNTER (EMERGENCY)
Age: 71
Discharge: HOME | End: 2021-08-20
Attending: STUDENT IN AN ORGANIZED HEALTH CARE EDUCATION/TRAINING PROGRAM
Payer: MEDICARE

## 2021-08-20 ENCOUNTER — HOSPITAL ENCOUNTER (EMERGENCY)
Age: 71
Discharge: LEFT BEFORE DISCHARGE | End: 2021-08-20
Payer: MEDICARE

## 2021-08-20 ENCOUNTER — APPOINTMENT (OUTPATIENT)
Dept: GENERAL RADIOLOGY | Age: 71
End: 2021-08-20
Payer: MEDICARE

## 2021-08-20 VITALS
BODY MASS INDEX: 35.68 KG/M2 | WEIGHT: 170 LBS | DIASTOLIC BLOOD PRESSURE: 72 MMHG | RESPIRATION RATE: 18 BRPM | SYSTOLIC BLOOD PRESSURE: 107 MMHG | OXYGEN SATURATION: 97 % | HEIGHT: 58 IN | HEART RATE: 77 BPM | TEMPERATURE: 97.1 F

## 2021-08-20 VITALS — OXYGEN SATURATION: 95 % | HEART RATE: 85 BPM | TEMPERATURE: 98.6 F

## 2021-08-20 DIAGNOSIS — M25.511 ACUTE PAIN OF RIGHT SHOULDER: Primary | ICD-10-CM

## 2021-08-20 DIAGNOSIS — M25.519 ACUTE SHOULDER PAIN, UNSPECIFIED LATERALITY: Primary | ICD-10-CM

## 2021-08-20 DIAGNOSIS — Z79.4 TYPE 2 DIABETES MELLITUS WITHOUT COMPLICATION, WITH LONG-TERM CURRENT USE OF INSULIN (HCC): ICD-10-CM

## 2021-08-20 DIAGNOSIS — E11.9 TYPE 2 DIABETES MELLITUS WITHOUT COMPLICATION, WITH LONG-TERM CURRENT USE OF INSULIN (HCC): ICD-10-CM

## 2021-08-20 PROCEDURE — 73060 X-RAY EXAM OF HUMERUS: CPT

## 2021-08-20 PROCEDURE — 73030 X-RAY EXAM OF SHOULDER: CPT

## 2021-08-20 PROCEDURE — 99283 EMERGENCY DEPT VISIT LOW MDM: CPT

## 2021-08-20 RX ORDER — IBUPROFEN 800 MG/1
800 TABLET ORAL 2 TIMES DAILY PRN
Qty: 60 TABLET | Refills: 0 | Status: SHIPPED | OUTPATIENT
Start: 2021-08-20 | End: 2022-10-31

## 2021-08-20 RX ORDER — IBUPROFEN 800 MG/1
800 TABLET ORAL ONCE
Status: DISCONTINUED | OUTPATIENT
Start: 2021-08-20 | End: 2021-08-20 | Stop reason: HOSPADM

## 2021-08-20 ASSESSMENT — PAIN DESCRIPTION - PAIN TYPE: TYPE: ACUTE PAIN

## 2021-08-20 ASSESSMENT — PAIN DESCRIPTION - LOCATION: LOCATION: SHOULDER

## 2021-08-20 ASSESSMENT — PAIN DESCRIPTION - DESCRIPTORS: DESCRIPTORS: CONSTANT;ACHING

## 2021-08-20 ASSESSMENT — PAIN DESCRIPTION - ORIENTATION: ORIENTATION: RIGHT

## 2021-08-20 ASSESSMENT — PAIN SCALES - GENERAL: PAINLEVEL_OUTOF10: 8

## 2021-08-20 NOTE — ED NOTES
Patient refused to have vital signs retaken before discharge     Nallely Rodríguez RN  08/20/21 [id number]

## 2021-08-20 NOTE — ED NOTES
Pt made this nurse aware that she is going to leave the ER and go to an urgent care. Exited at this time.       [facility], LPN  86/80/91 [id number]

## 2021-08-20 NOTE — ED PROVIDER NOTES
Department of Emergency Medicine   ED  Provider Note  Admit Date/RoomTime: 8/20/2021 12:25 PM  ED Room: 14/14          History of Present Illness:  8/20/21, Time: 12:46 PM EDT  Chief Complaint   Patient presents with    Shoulder Injury     Tuesday patient was walking and almost fell and jerk self and now has had continues pain in right shoulder. Rosa Isela Almaraz is a 70 y.o. female presenting to the ED for shoulder injury, beginning three days ago. The complaint has been persistent, moderate in severity, and worsened by certain movements. The patient is a 66-year-old female who presents to the emergency department complaining of right shoulder pain. The patient symptoms were sudden in onset 3 days ago, and has been persistent, moderate severity, and nothing makes it better. It is worse with movement. The patient states that she was walking and started to fall backwards when she lost her balance and try to jerk herself prevent herself from falling and the way she twisted her shoulder has been experiencing pain since then. She describes the pain as an aching sensation in the lateral aspect of her right shoulder and in her right upper arm. She states that she does not have a history of surgery on the shoulder in the past.  The patient did not take anything for her symptoms prior to arrival.  Denies falling to the ground, hitting her head, loss of consciousness, chest pain, shortness of breath, abdominal pain, numbness, tingling, other injuries, or other acute symptoms or concerns.     Review of Systems:   Pertinent positives and negatives are stated within HPI, all other systems reviewed and are negative.        --------------------------------------------- PAST HISTORY ---------------------------------------------  Past Medical History:  has a past medical history of Acute MI ([facility] CAD (coronary artery disease), Carotid artery occlusion, Diabetes mellitus ([facility] Diverticulitis, Hyperlipidemia, Hypertension, Obese, Osteoarthritis, and Thyroid nodule. Past Surgical History:  has a past surgical history that includes Diagnostic Cardiac Cath Lab Procedure; Carpal tunnel release; Hysterectomy; knee surgery (05/2017); Carpal tunnel release (Right, 12/13/2019); Finger trigger release (Right, 12/13/2019); Thyroidectomy (N/A, 01/16/2020); Upper gastrointestinal endoscopy (07/02/2021); and Upper gastrointestinal endoscopy (N/A, 7/2/2021). Social History:  reports that she quit smoking about 10 years ago. She started smoking about 53 years ago. She has a 43.00 pack-year smoking history. She has never used smokeless tobacco. She reports that she does not drink alcohol and does not use drugs. Family History: family history includes Diabetes in her father and paternal grandmother; Heart Disease in her father, maternal grandmother, mother, paternal grandmother, and sister. . Unless otherwise noted, family history is non contributory    The patients home medications have been reviewed. Allergies: Patient has no known allergies. I have reviewed the past medical history, past surgical history, social history, and family history    ---------------------------------------------------PHYSICAL EXAM--------------------------------------    Constitutional/General: Alert and oriented x3  Head: Normocephalic and atraumatic  Eyes: PERRL, EOMI, sclera non icteric  Mouth: Oropharynx clear, handling secretions, no trismus, no asymmetry of the posterior oropharynx or uvular edema  Neck: Supple, full ROM, no stridor, no meningeal signs  Respiratory: Lungs clear to auscultation bilaterally, no wheezes, rales, or rhonchi. Not in respiratory distress  Cardiovascular:  Regular rate. Regular rhythm. No murmurs, no aortic murmurs, no gallops, or rubs. 2+ distal pulses. Equal extremity pulses. Chest: No chest wall tenderness  Gastrointestinal:  Abdomen Soft, Non tender, Non distended.  No rebound, guarding, or rigidity. No pulsatile masses. Musculoskeletal: Moderate tenderness palpation diffusely over the right shoulder and right humerus. There is no evidence of trauma or overlying skin changes. Sensation is intact and equal to the bilateral upper extremities. Radial pulses are +2 and equal bilaterally. Decreased range of motion of the right shoulder secondary to pain. Capillary refill is less than 2 seconds. Compartments of the arm are soft and compressible. Skin: skin warm and dry. No rashes. Neurologic: GCS 15, no focal deficits, symmetric strength 5/5 in the upper and lower extremities bilaterally  Psychiatric: Normal Affect    -------------------------------------------------- RESULTS -------------------------------------------------  I have personally reviewed all laboratory and imaging results for this patient. Results are listed below. LABS: (Lab results interpreted by me)  No results found for this visit on 08/20/21.,       RADIOLOGY:  Interpreted by Radiologist unless otherwise specified  XR SHOULDER RIGHT (MIN 2 VIEWS)   Final Result   The no acute fractures. Mild degenerative changes. XR HUMERUS RIGHT (MIN 2 VIEWS)   Final Result   Negative right humerus. No fracture or acute disease.             ------------------------- NURSING NOTES AND VITALS REVIEWED ---------------------------   The nursing notes within the ED encounter and vital signs as below have been reviewed by myself  /72   Pulse 77   Temp 97.1 °F (36.2 °C) (Infrared)   Resp 18   Ht 4' 10\" (1.473 m)   Wt 170 lb (77.1 kg)   SpO2 97%   BMI 35.53 kg/m²     Oxygen Saturation Interpretation: 97 % on room air. Normal    The patients available past medical records and past encounters were reviewed.         ------------------------------ ED COURSE/MEDICAL DECISION MAKING----------------------  Medications - No data to display          Medical Decision Making:       The patient is a 80-year-old female who presents to the emergency department complaining of shoulder pain. She is hemodynamically stable, nontoxic in appearance, and in no acute distress. She was treated with ibuprofen. She denied multiple times hitting her head or falling to the ground or any other injuries or acute symptoms or concerns. It seems more musculoskeletal as it is worse with movement of the shoulder. X-rays are reassuring and did not show any evidence of fracture or other acute abnormalities. She was placed in a sling. Recommend her to follow-up with orthopedics. Discussed with her that she may have a ligamentous injury or tendon injury that does not show up on the x-ray. She is to return here for any worsening symptoms or other acute symptoms or concerns. Patient verbalized understanding and agreement to treatment plan discharge instructions. Re-Evaluations:       Patient was feeling better upon reassessment. This patient's ED course included: a personal history and physicial examination, re-evaluation prior to disposition, multiple bedside re-evaluations, IV medications, cardiac monitoring, continuous pulse oximetry and complex medical decision making and emergency management    This patient has remained hemodynamically stable during their ED course. Counseling: The emergency provider has spoken with the patient and discussed todays results, in addition to providing specific details for the plan of care and counseling regarding the diagnosis and prognosis. Questions are answered at this time and they are agreeable with the plan.       --------------------------------- IMPRESSION AND DISPOSITION ---------------------------------    IMPRESSION  1. Acute pain of right shoulder        DISPOSITION  Disposition: Discharge to home  Patient condition is stable        NOTE: This report was transcribed using voice recognition software.  Every effort was made to ensure accuracy; however, inadvertent computerized transcription errors may be present       Gal Graham DO  Resident  08/21/21 9431

## 2021-08-23 NOTE — TELEPHONE ENCOUNTER
From: Kadi Rai  To: ROXY Levin - [place]  Sent: 8/20/2021 4:25 PM EDT  Subject: Non-Urgent Medical Question    Along with everything else I need an orthopedic doctor cause I hurt my shoulder.  Got X-rays nothing broken but need to follow up with ortho Thanks

## 2021-08-24 ENCOUNTER — TELEPHONE (OUTPATIENT)
Dept: FAMILY MEDICINE CLINIC | Age: 71
End: 2021-08-24

## 2021-08-24 NOTE — TELEPHONE ENCOUNTER
----- Message from Heather Alanis sent at 2021 12:05 PM EDT -----  Subject: Appointment Request    Reason for Call: New Patient Request Appointment    QUESTIONS  Type of Appointment? New Patient/New to Provider  Reason for appointment request? No appointments available during search  Additional Information for Provider? Patient would like to schedule a new   patient appointment for Dr. Jmi Milligan. Patient screened green   ---------------------------------------------------------------------------  --------------  CALL BACK INFO  What is the best way for the office to contact you? OK to leave message on   voicemail, OK to respond with electronic message via [employer] portal (only   for patients who have registered [employer] account)  Preferred Call Back Phone Number? [phone]  ---------------------------------------------------------------------------  --------------  SCRIPT ANSWERS  Relationship to Patient? Self  Specialty Confirmation? Primary Care  Is this the first appointment to establish care for a ? No  Have you been diagnosed with, awaiting test results for, or told that you   are suspected of having COVID-19 (Coronavirus)? (If patient has tested   negative or was tested as a requirement for work, school, or travel and   not based on symptoms, answer no)? No  Do you currently have flu-like symptoms including fever or chills, cough,   shortness of breath, difficulty breathing, or new loss of taste or smell? No  Have you had close contact with someone with COVID-19 in the last 14 days? No  (Service Expert  click yes below to proceed with Chaitanya Peck As Usual   Scheduling)?  Yes

## 2021-08-24 NOTE — TELEPHONE ENCOUNTER
Last Appointment:  7/7/2021  Future Appointments   Date Time Provider Radha Vargas   10/19/2021  9:00 AM Natalie Holland APRN - CNP [facility]   4/25/2022  9:00 AM Natalie Holland, APRN - CNP [facility]   4/25/2023 10:30 AM [facility] VAS US RM 1 JENAE Mina   4/25/2023 11:00 AM Lacho Herndon MD [facility]      Phone call from patient's daughter. Patient fell last week and had to go to the ER. ER recommended she see ortho. Can we do referral?  She is having severe pain in her shoulder. Also, her insurance will not cover her victoza. She talked to the pharmacist at [facility] and he recommended she take insulin. Please advise. Can reach daughter Meri Poole at [phone].

## 2021-08-25 RX ORDER — INSULIN GLARGINE 100 [IU]/ML
10 INJECTION, SOLUTION SUBCUTANEOUS NIGHTLY
Qty: 5 PEN | Refills: 0 | Status: SHIPPED
Start: 2021-08-25 | End: 2021-09-09 | Stop reason: ALTCHOICE

## 2021-09-01 ENCOUNTER — OFFICE VISIT (OUTPATIENT)
Dept: ORTHOPEDIC SURGERY | Age: 71
End: 2021-09-01
Payer: MEDICARE

## 2021-09-01 VITALS — WEIGHT: 172 LBS | HEIGHT: 58 IN | BODY MASS INDEX: 36.11 KG/M2 | TEMPERATURE: 98 F

## 2021-09-01 DIAGNOSIS — M75.101 TEAR OF RIGHT ROTATOR CUFF, UNSPECIFIED TEAR EXTENT, UNSPECIFIED WHETHER TRAUMATIC: Primary | ICD-10-CM

## 2021-09-01 PROCEDURE — 4040F PNEUMOC VAC/ADMIN/RCVD: CPT | Performed by: ORTHOPAEDIC SURGERY

## 2021-09-01 PROCEDURE — G8399 PT W/DXA RESULTS DOCUMENT: HCPCS | Performed by: ORTHOPAEDIC SURGERY

## 2021-09-01 PROCEDURE — 1036F TOBACCO NON-USER: CPT | Performed by: ORTHOPAEDIC SURGERY

## 2021-09-01 PROCEDURE — 3017F COLORECTAL CA SCREEN DOC REV: CPT | Performed by: ORTHOPAEDIC SURGERY

## 2021-09-01 PROCEDURE — G8417 CALC BMI ABV UP PARAM F/U: HCPCS | Performed by: ORTHOPAEDIC SURGERY

## 2021-09-01 PROCEDURE — G8427 DOCREV CUR MEDS BY ELIG CLIN: HCPCS | Performed by: ORTHOPAEDIC SURGERY

## 2021-09-01 PROCEDURE — 1123F ACP DISCUSS/DSCN MKR DOCD: CPT | Performed by: ORTHOPAEDIC SURGERY

## 2021-09-01 PROCEDURE — 99213 OFFICE O/P EST LOW 20 MIN: CPT | Performed by: ORTHOPAEDIC SURGERY

## 2021-09-01 PROCEDURE — 1090F PRES/ABSN URINE INCON ASSESS: CPT | Performed by: ORTHOPAEDIC SURGERY

## 2021-09-01 NOTE — PROGRESS NOTES
Chief Complaint   Patient presents with    Shoulder Pain     Right Shoulder x 2 weeks, states was going to trip and jerked her body now has pain in the right shoulder, no previous right shoulder surgery. HPI:    Patient is 70 y.o. female complaining of right shoulder pain and weakness for 2 weeks. She denies a specific traumatic injury to the right shoulder but almost tripped catching herself. Previous treatments include rest, ice, and anti-inflammatory medication and HEP without much relief. She denies any other orthopedic complaints. ROS:    Skin: (-) rash,(-) psoriasis,(-) eczema, (-)skin cancer. Neurologic: (-)numbness, (-)tingling, (-)headaches, (-) LOC. Cardiovascular: (-) Chest pain, (-) swelling in legs/feet, (-) SOB, (-) cramping in legs/feet with walking.     All other review of systems negative except stated above or in HPI      Past Medical History:   Diagnosis Date    Acute MI [address] [address] 07/11/08    CAD (coronary artery disease)     Dr. Sharonda Coleman Carotid artery occlusion     Diabetes mellitus ([facility] [address]     Diverticulitis     Hyperlipidemia     Hypertension     Obese     Osteoarthritis     Thyroid nodule      Past Surgical History:   Procedure Laterality Date    CARPAL TUNNEL RELEASE      (L)    CARPAL TUNNEL RELEASE Right 12/13/2019    RIGHT CARPAL TUNNEL RELEASE ENDOSCOPIC performed by Kayli Dutta MD at [facility] CATH LAB PROCEDURE      stent placed    [place] Right 12/13/2019    RIGHT THUMB TRIGGER RELEASE performed by Kayli Dutta MD at [address]  05/2017    LT knee torn meniscus / DR Espinal  2017    THYROIDECTOMY N/A 01/16/2020    TOTAL THYROIDECTOMY--NERVE INTEGRITY MONITOR performed by Marjorie Jimenez DO at [address]  07/02/2021    severe gastritis--jose angel    UPPER GASTROINTESTINAL ENDOSCOPY N/A 7/2/2021    EGD BIOPSY performed by Maria Luisa Aguilar MD at [facility] ENDOSCOPY       Current Outpatient Medications:     insulin glargine (LANTUS SOLOSTAR) 100 UNIT/ML injection pen, Inject 10 Units into the skin nightly, Disp: 5 pen, Rfl: 0    ibuprofen (ADVIL;MOTRIN) 800 MG tablet, Take 1 tablet by mouth 2 times daily as needed for Pain, Disp: 60 tablet, Rfl: 0    blood glucose test strips (ACCU-CHEK STEVEN PLUS) strip, TEST TWO TIMES DAILY, Disp: 200 strip, Rfl: 5    atenolol (TENORMIN) 50 MG tablet, Take 1 tablet by mouth daily, Disp: 90 tablet, Rfl: 1    levothyroxine (SYNTHROID) 100 MCG tablet, TAKE 1 TABLET EVERY DAY, Disp: 90 tablet, Rfl: 1    Alcohol Swabs (B-D SINGLE USE SWABS REGULAR) PADS, USE AS DIRECTED AS NEEDED  TO TEST BLOOD SUGAR, Disp: 100 each, Rfl: 5    venlafaxine (EFFEXOR XR) 150 MG extended release capsule, TAKE 1 CAPSULE EVERY DAY, Disp: 90 capsule, Rfl: 3    Accu-Chek Softclix Lancets MISC, TEST TWO TIMES DAILY, Disp: 200 each, Rfl: 2    quinapril (ACCUPRIL) 5 MG tablet, TAKE 1 TABLET EVERY NIGHT, Disp: 90 tablet, Rfl: 2    simvastatin (ZOCOR) 80 MG tablet, TAKE 1 TABLET EVERY NIGHT, Disp: 90 tablet, Rfl: 2    Insulin Pen Needle (DROPLET PEN NEEDLES) 31G X 6 MM MISC, Inject 1 each into the skin 3 times daily, Disp: 100 each, Rfl: 5    acetaminophen (TYLENOL) 500 MG tablet, Take 1 tablet by mouth 4 times daily as needed for Pain, Disp: 360 tablet, Rfl: 1    aspirin 81 MG EC tablet, Take 1 tablet by mouth daily LD 12-4-19 (Patient taking differently: Take 81 mg by mouth daily ), Disp: 90 tablet, Rfl: 1    Blood Glucose Monitoring Suppl (ACCU-CHEK STEVEN PLUS) w/Device KIT, CHECK BLOOD SUGAR TWICE DAILY, Disp: 1 kit, Rfl: 0    pantoprazole (PROTONIX) 40 MG tablet, Take 1 tablet by mouth 2 times daily, Disp: 180 tablet, Rfl: 1  No Known Allergies  Social History     Socioeconomic History    Marital status:      Spouse name: Not on file    Number of children: Not on file    Years of education: Not on file    Highest education level: Not on file   Occupational History    Occupation: retired    Tobacco Use    Smoking status: Former Smoker     Packs/day: 1.00     Years: 43.00     Pack years: 43.00     Start date: 1968     Quit date: 3/18/2011     Years since quitting: 10.4    Smokeless tobacco: Never Used   Vaping Use    Vaping Use: Never used   Substance and Sexual Activity    Alcohol use: No    Drug use: No    Sexual activity: Yes   Other Topics Concern    Not on file   Social History Narrative    Not on file     Social Determinants of Health     Financial Resource Strain:     Difficulty of Paying Living Expenses:    Food Insecurity:     Worried About Running Out of Food in the Last Year:     [address] in the Last Year:    Transportation Needs:     Lack of Transportation (Medical):  Lack of Transportation (Non-Medical):    Physical Activity:     Days of Exercise per Week:     Minutes of Exercise per Session:    Stress:     Feeling of Stress :    Social Connections:     Frequency of Communication with Friends and Family:     Frequency of Social Gatherings with Friends and Family:     Attends [employer] Services:     Active Member of Clubs or Organizations:     Attends Club or Organization Meetings:     Marital Status:    Intimate Partner Violence:     Fear of Current or Ex-Partner:     Emotionally Abused:     Physically Abused:     Sexually Abused:      Family History   Problem Relation Age of Onset    Heart Disease Mother     Heart Disease Father     Diabetes Father     Heart Disease Sister     Heart Disease Maternal Grandmother     Heart Disease Paternal Grandmother     Diabetes Paternal Grandmother            Physical Exam:    Temp 98 °F (36.7 °C)   Ht 4' 10\" (1.473 m)   Wt 172 lb (78 kg)   BMI 35.95 kg/m²     GENERAL: alert, appears stated age, cooperative, no acute distress    HEENT: Head is normocephalic, atraumatic. PERRLA. SKIN: Clean, dry, intact.  There is not any cellulitis or cutaneous lesions noted in the upper extremities    PULMONARY: breathing is regular and unlabored, no acute distress    CV: The bilateral upper and lower extremities are warm and well-perfused with brisk capillary refill. 2+ pulses UE and LE bilateral.     PSYCHIATRY: Pleasant mood, appropriate behavior, follows commands    NEURO: Sensation is intact distally with light touch with no alteration. Motor exam of the upper extremities show elbow flexion and extension, wrist flexion and extension, and finger abduction grossly intact 5/5. Upper extremity reflexes are bilaterally symmetrical and within normal limits. LYMPH: No lymphedema present distally in upper or lower extremity. MUSCULOSKELETAL:  Shoulder Exam:  Examination of the right shoulder shows: There is not a deformity. There is not erythema. There is not soft tissue swelling. Deltoid region is  tender to palpation. AC Joint is  tender to palpation. Clavicle is not tender to palpation. Bicipital Groove is  tender to palpation. Pectoralis  is not tender to palpation. Scapula/ trapezius is  tender to palpation.   Left:  ROM Full, Strength: Supraspinatus 5/5, Infraspinatus 5/5, Subscapularis 5/5  Right:  ROM 90/90/60, Strength: Supraspinatus 4/5, Infraspinatus 5/5, Subscapularis 5/5  Left Shoulder:  Crepitus:  no   Tenderness:  none   Effusion:   none   Impingement: negative   Empty Can:  negative   Speed's:  negative      Apprehension:  negative   Cross Arm Sign:  negative   Wallace's:  negative       Neer's:  negative      Belly Press Test:  negative      Drop Arm Test:  negative     Right Shoulder:  Crepitus:  no   Tenderness:  mild   Effusion:   non   Impingement: positive   Empty Can:  positive   Speed's: positive   Apprehension:  not tested   Cross Arm Sign:  positive   Wallace's:  positive      Neer's:  positive      Belly Press Test:  negative   Drop Arm Test:  positive           Imaging:  XR SHOULDER RIGHT (MIN 2 VIEWS)    Result Date:

## 2021-09-01 NOTE — PROGRESS NOTES
Chief Complaint   Patient presents with    Shoulder Pain     Right Shoulder x 2 weeks, states was going to trip and jerked her body now has pain in the right shoulder, no previous right shoulder surgery. HPI:    Patient is 70 y.o. female complaining of right shoulder pain and weakness for ***. She denies a specific traumatic injury to the right shoulder. Previous treatments include rest, ice, and anti-inflammatory medication and HEP without much relief. She denies any other orthopedic complaints. ROS:    Skin: (-) rash,(-) psoriasis,(-) eczema, (-)skin cancer. Neurologic: (-)numbness, (-)tingling, (-)headaches, (-) LOC. Cardiovascular: (-) Chest pain, (-) swelling in legs/feet, (-) SOB, (-) cramping in legs/feet with walking.     All other review of systems negative except stated above or in HPI      Past Medical History:   Diagnosis Date    Acute MI ([facility] [address] 07/11/08    CAD (coronary artery disease)     Dr. Neal Lema Carotid artery occlusion     Diabetes mellitus ([facility] [address]     Diverticulitis     Hyperlipidemia     Hypertension     Obese     Osteoarthritis     Thyroid nodule      Past Surgical History:   Procedure Laterality Date    CARPAL TUNNEL RELEASE      (L)    CARPAL TUNNEL RELEASE Right 12/13/2019    RIGHT CARPAL TUNNEL RELEASE ENDOSCOPIC performed by Mulu Alexander MD at [address] [facility] CATH LAB PROCEDURE      stent placed    [place] Right 12/13/2019    RIGHT THUMB TRIGGER RELEASE performed by Mulu Alexander MD at [facility]  05/2017    LT knee torn meniscus / DR Espinal  2017    THYROIDECTOMY N/A 01/16/2020    TOTAL THYROIDECTOMY--NERVE INTEGRITY MONITOR performed by Jolie Potter DO at [address]  07/02/2021    severe gastritis--jose angel    UPPER GASTROINTESTINAL ENDOSCOPY N/A 7/2/2021    EGD BIOPSY performed by Marcia Ortiz MD at [address] Medications:     insulin glargine (LANTUS SOLOSTAR) 100 UNIT/ML injection pen, Inject 10 Units into the skin nightly, Disp: 5 pen, Rfl: 0    ibuprofen (ADVIL;MOTRIN) 800 MG tablet, Take 1 tablet by mouth 2 times daily as needed for Pain, Disp: 60 tablet, Rfl: 0    blood glucose test strips (ACCU-CHEK STEVEN PLUS) strip, TEST TWO TIMES DAILY, Disp: 200 strip, Rfl: 5    atenolol (TENORMIN) 50 MG tablet, Take 1 tablet by mouth daily, Disp: 90 tablet, Rfl: 1    levothyroxine (SYNTHROID) 100 MCG tablet, TAKE 1 TABLET EVERY DAY, Disp: 90 tablet, Rfl: 1    Alcohol Swabs (B-D SINGLE USE SWABS REGULAR) PADS, USE AS DIRECTED AS NEEDED  TO TEST BLOOD SUGAR, Disp: 100 each, Rfl: 5    venlafaxine (EFFEXOR XR) 150 MG extended release capsule, TAKE 1 CAPSULE EVERY DAY, Disp: 90 capsule, Rfl: 3    Accu-Chek Softclix Lancets MISC, TEST TWO TIMES DAILY, Disp: 200 each, Rfl: 2    quinapril (ACCUPRIL) 5 MG tablet, TAKE 1 TABLET EVERY NIGHT, Disp: 90 tablet, Rfl: 2    simvastatin (ZOCOR) 80 MG tablet, TAKE 1 TABLET EVERY NIGHT, Disp: 90 tablet, Rfl: 2    Insulin Pen Needle (DROPLET PEN NEEDLES) 31G X 6 MM MISC, Inject 1 each into the skin 3 times daily, Disp: 100 each, Rfl: 5    acetaminophen (TYLENOL) 500 MG tablet, Take 1 tablet by mouth 4 times daily as needed for Pain, Disp: 360 tablet, Rfl: 1    aspirin 81 MG EC tablet, Take 1 tablet by mouth daily LD 12-4-19 (Patient taking differently: Take 81 mg by mouth daily ), Disp: 90 tablet, Rfl: 1    Blood Glucose Monitoring Suppl (ACCU-CHEK STEVEN PLUS) w/Device KIT, CHECK BLOOD SUGAR TWICE DAILY, Disp: 1 kit, Rfl: 0    pantoprazole (PROTONIX) 40 MG tablet, Take 1 tablet by mouth 2 times daily, Disp: 180 tablet, Rfl: 1  No Known Allergies  Social History     Socioeconomic History    Marital status:      Spouse name: Not on file    Number of children: Not on file    Years of education: Not on file    Highest education level: Not on file   Occupational History    extremities    PULMONARY: breathing is regular and unlabored, no acute distress    CV: The bilateral upper and lower extremities are warm and well-perfused with brisk capillary refill. 2+ pulses UE and LE bilateral.     PSYCHIATRY: Pleasant mood, appropriate behavior, follows commands    NEURO: Sensation is intact distally with light touch with no alteration. Motor exam of the upper extremities show elbow flexion and extension, wrist flexion and extension, and finger abduction grossly intact 5/5. Upper extremity reflexes are bilaterally symmetrical and within normal limits. LYMPH: No lymphedema present distally in upper or lower extremity. MUSCULOSKELETAL:  Shoulder Exam:  Examination of the right shoulder shows: There is not a deformity. There is not erythema. There is not soft tissue swelling. Deltoid region is  tender to palpation. AC Joint is  tender to palpation. Clavicle is not tender to palpation. Bicipital Groove is  tender to palpation. Pectoralis  is not tender to palpation. Scapula/ trapezius is  tender to palpation.   Left:  ROM Full, Strength: Supraspinatus 5/5, Infraspinatus 5/5, Subscapularis 5/5  Right:  ROM ***/***/***, Strength: Supraspinatus 4/5, Infraspinatus 5/5, Subscapularis 5/5  Left Shoulder:  Crepitus:  no   Tenderness:  none   Effusion:   none   Impingement: negative   Empty Can:  negative   Speed's:  negative      Apprehension:  negative   Cross Arm Sign:  negative   [place]'s:  negative       Neer's:  negative      Belly Press Test:  negative      Drop Arm Test:  negative     Right Shoulder:  Crepitus:  no   Tenderness:  mild   Effusion:   non   Impingement: positive   Empty Can:  positive   Speed's: positive   Apprehension:  not tested   Cross Arm Sign:  positive   [place]'s:  positive      Neer's:  positive      Belly Press Test:  negative   Drop Arm Test:  positive           Imaging:  XR SHOULDER RIGHT (MIN 2 VIEWS)    Result Date: 8/20/2021  EXAMINATION: THREE XRAY VIEWS OF THE RIGHT SHOULDER 8/20/2021 2:25 pm COMPARISON: None. HISTORY: ORDERING SYSTEM PROVIDED HISTORY: pain TECHNOLOGIST PROVIDED HISTORY: Reason for exam:->pain FINDINGS: There is no acute fracture or dislocation in the right shoulder. Mild degenerative changes are noted in the gleno humeral joint and AC joint. The no acute fractures. Mild degenerative changes. XR HUMERUS RIGHT (MIN 2 VIEWS)    Result Date: 8/20/2021  EXAMINATION: TWO XRAY VIEWS OF THE RIGHT HUMERUS 8/20/2021 2:25 pm COMPARISON: None. HISTORY: ORDERING SYSTEM PROVIDED HISTORY: pain TECHNOLOGIST PROVIDED HISTORY: Reason for exam:->pain FINDINGS: No fracture, dislocation, or bony abnormality. As visualized, the soft tissues are negative     Negative right humerus. No fracture or acute disease. There are no diagnoses linked to this encounter. Patient seen and examined. X-rays reviewed. Patient has exam and history consistent with rotator cuff pathology. MRI recommended for further evaluation and management of possible rotator cuff tear.   Return to clinic after DO Juan Diego  9/1/21

## 2021-09-09 ENCOUNTER — OFFICE VISIT (OUTPATIENT)
Dept: FAMILY MEDICINE CLINIC | Age: 71
End: 2021-09-09
Payer: MEDICARE

## 2021-09-09 VITALS
RESPIRATION RATE: 18 BRPM | WEIGHT: 173 LBS | OXYGEN SATURATION: 96 % | HEART RATE: 68 BPM | HEIGHT: 59 IN | BODY MASS INDEX: 34.88 KG/M2 | TEMPERATURE: 97.2 F | SYSTOLIC BLOOD PRESSURE: 110 MMHG | DIASTOLIC BLOOD PRESSURE: 78 MMHG

## 2021-09-09 DIAGNOSIS — E04.1 THYROID NODULE: ICD-10-CM

## 2021-09-09 DIAGNOSIS — I10 ESSENTIAL HYPERTENSION: ICD-10-CM

## 2021-09-09 DIAGNOSIS — Z79.4 TYPE 2 DIABETES MELLITUS WITHOUT COMPLICATION, WITH LONG-TERM CURRENT USE OF INSULIN (HCC): Primary | ICD-10-CM

## 2021-09-09 DIAGNOSIS — E11.9 TYPE 2 DIABETES MELLITUS WITHOUT COMPLICATION, WITH LONG-TERM CURRENT USE OF INSULIN (HCC): Primary | ICD-10-CM

## 2021-09-09 DIAGNOSIS — E78.5 HYPERLIPIDEMIA, UNSPECIFIED HYPERLIPIDEMIA TYPE: ICD-10-CM

## 2021-09-09 LAB — HBA1C MFR BLD: 5.9 %

## 2021-09-09 PROCEDURE — 1036F TOBACCO NON-USER: CPT | Performed by: INTERNAL MEDICINE

## 2021-09-09 PROCEDURE — 3017F COLORECTAL CA SCREEN DOC REV: CPT | Performed by: INTERNAL MEDICINE

## 2021-09-09 PROCEDURE — G8417 CALC BMI ABV UP PARAM F/U: HCPCS | Performed by: INTERNAL MEDICINE

## 2021-09-09 PROCEDURE — 1123F ACP DISCUSS/DSCN MKR DOCD: CPT | Performed by: INTERNAL MEDICINE

## 2021-09-09 PROCEDURE — 4040F PNEUMOC VAC/ADMIN/RCVD: CPT | Performed by: INTERNAL MEDICINE

## 2021-09-09 PROCEDURE — 1090F PRES/ABSN URINE INCON ASSESS: CPT | Performed by: INTERNAL MEDICINE

## 2021-09-09 PROCEDURE — G8427 DOCREV CUR MEDS BY ELIG CLIN: HCPCS | Performed by: INTERNAL MEDICINE

## 2021-09-09 PROCEDURE — 3044F HG A1C LEVEL LT 7.0%: CPT | Performed by: INTERNAL MEDICINE

## 2021-09-09 PROCEDURE — 83036 HEMOGLOBIN GLYCOSYLATED A1C: CPT | Performed by: INTERNAL MEDICINE

## 2021-09-09 PROCEDURE — 2022F DILAT RTA XM EVC RTNOPTHY: CPT | Performed by: INTERNAL MEDICINE

## 2021-09-09 PROCEDURE — 99214 OFFICE O/P EST MOD 30 MIN: CPT | Performed by: INTERNAL MEDICINE

## 2021-09-09 PROCEDURE — G8399 PT W/DXA RESULTS DOCUMENT: HCPCS | Performed by: INTERNAL MEDICINE

## 2021-09-09 NOTE — PROGRESS NOTES
[facility] PRIMARY CARE  [address] [place] [address]  [place] [address]  Dept: [phone]  Dept Fax: [phone]     NAME: Elia Lew        :  1950        MRN:  [id number]    Chief Complaint   Patient presents with   Abdon Montez Doctor    Discuss Medications     Victoza not covered and lantus solostar / just used up old victoza rx       History of Present Illness  Elia Lew is a 70 y.o. female who presents today to [facility] care. Prior patient of [employer]. Patient was started on Victoza by her prior PCP although due to insurance restrictions she was unable to continue Victoza. She spoke with her pharmacist at [facility] who recommended that she use regular insulin instead as it is affordable for her. A1c obtained in the office today was 5.9%. This A1c was while patient has been on Victoza over the last several months. At this time we will trial patient off of all medications. She has been decreasing her general carb intake and making positive dietary changes over the last few months as well. Patient remains hopeful that she will be able to control her diabetes with diet alone. Review of Systems  Please see HPI above. All bolded are positive.   Gen: fever, chills, fatigue, weakness, diaphoresis, or unintentional weight change  Head: headache, vision change, hearing loss  Chest: chest pain/heaviness, palpitations  Lungs: shortness of breath, wheezing, coughing, hemoptysis  Abdomen: abdominal pain, nausea, vomiting, diarrhea, constipation, melena, hematochezia, hematemesis, or loss of appetite  Extremities: lower extremity edema, myalgias, arthralgias  Urinary: dysuria, hematuria, weak flow, or increase in frequency  Neurologic: lightheadedness, dizziness, confusion, syncope  Endocrine: polydipsia, polyuria, heat or cold intolerance  Psychiatric: depression, suicidal ideation, or anxiety  Derm: Rashes, ulcers, burns    Medical History   Past Medical History:   Diagnosis Date    Acute MI ([facility] [address] 07/11/08    CAD (coronary artery disease)     Dr. Leonid Osman Carotid artery occlusion     Diabetes mellitus ([facility] [address]     Diverticulitis     Hyperlipidemia     Hypertension     Obese     Osteoarthritis     Thyroid nodule        Surgical History   Past Surgical History:   Procedure Laterality Date    CARPAL TUNNEL RELEASE      (L)    CARPAL TUNNEL RELEASE Right 12/13/2019    RIGHT CARPAL TUNNEL RELEASE ENDOSCOPIC performed by Austin Gallo MD at [facility] CATH LAB PROCEDURE      stent placed    [place] Right 12/13/2019    RIGHT THUMB TRIGGER RELEASE performed by Austin Gallo MD at [facility]  05/2017    LT knee torn meniscus / DR Espinal  2017    THYROIDECTOMY N/A 01/16/2020    TOTAL THYROIDECTOMY--NERVE INTEGRITY MONITOR performed by Kyleigh Ortiz DO at [facility]  07/02/2021    severe gastritis--jose angel    UPPER GASTROINTESTINAL ENDOSCOPY N/A 7/2/2021    EGD BIOPSY performed by Jamee Adorno MD at [facility] History  Family History   Problem Relation Age of Onset    Heart Disease Mother     Heart Disease Father     Diabetes Father     Heart Disease Sister     Heart Disease Maternal Grandmother     Heart Disease Paternal Grandmother     Diabetes Paternal Grandmother        Social History  Social History     Tobacco Use    Smoking status: Former Smoker     Packs/day: 1.00     Years: 43.00     Pack years: 43.00     Start date: 1968     Quit date: 3/18/2011     Years since quitting: 10.4    Smokeless tobacco: Never Used   Substance Use Topics    Alcohol use: No       Home Medications  Current Outpatient Medications   Medication Sig Dispense Refill    ibuprofen (ADVIL;MOTRIN) 800 MG tablet Take 1 tablet by mouth 2 times daily as needed for Pain 60 tablet 0    blood glucose test strips (ACCU-CHEK STEVEN PLUS) strip TEST TWO TIMES DAILY 200 strip 5    atenolol (TENORMIN) 50 MG tablet Take 1 tablet by mouth daily 90 tablet 1    levothyroxine (SYNTHROID) 100 MCG tablet TAKE 1 TABLET EVERY DAY 90 tablet 1    pantoprazole (PROTONIX) 40 MG tablet Take 1 tablet by mouth 2 times daily (Patient taking differently: Take 40 mg by mouth 2 times daily Patient taking once daily) 180 tablet 1    Alcohol Swabs (B-D SINGLE USE SWABS REGULAR) PADS USE AS DIRECTED AS NEEDED  TO TEST BLOOD SUGAR 100 each 5    venlafaxine (EFFEXOR XR) 150 MG extended release capsule TAKE 1 CAPSULE EVERY DAY 90 capsule 3    Accu-Chek Softclix Lancets MISC TEST TWO TIMES DAILY 200 each 2    quinapril (ACCUPRIL) 5 MG tablet TAKE 1 TABLET EVERY NIGHT 90 tablet 2    simvastatin (ZOCOR) 80 MG tablet TAKE 1 TABLET EVERY NIGHT 90 tablet 2    Insulin Pen Needle (DROPLET PEN NEEDLES) 31G X 6 MM MISC Inject 1 each into the skin 3 times daily 100 each 5    acetaminophen (TYLENOL) 500 MG tablet Take 1 tablet by mouth 4 times daily as needed for Pain 360 tablet 1    aspirin 81 MG EC tablet Take 1 tablet by mouth daily LD 12-4-19 (Patient taking differently: Take 81 mg by mouth daily ) 90 tablet 1    Blood Glucose Monitoring Suppl (ACCU-CHEK STEVEN PLUS) w/Device KIT CHECK BLOOD SUGAR TWICE DAILY 1 kit 0     No current facility-administered medications for this visit. Allergies  No Known Allergies    Objective  Vitals:    09/09/21 1357   BP: 110/78   Pulse: 68   Resp: 18   Temp: 97.2 °F (36.2 °C)   TempSrc: Oral   SpO2: 96%   Weight: 173 lb (78.5 kg)   Height: 4' 11\" (1.499 m)        Physical Exam:  General: Awake, alert, and oriented to person, place, time, and purpose, appears stated age and cooperative, No acute distress  Head: Normocephalic, atraumatic  Eyes: conjunctivae/corneas clear, EOM's intact.   Mouth: Mucous membranes moist with no pharyngeal exudate or erythema  Neck: no JVD, no adenopathy, no carotid bruit, supple, symmetrical, trachea midline  Back: symmetric, ROM normal, No CVA tenderness. Lungs: clear to auscultation bilaterally without wheezes, rales, or rhonchi  Heart: regular rate and rhythm, S1, S2 normal, no murmur, click, rub or gallop  Abdomen: soft, non-tender; bowel sounds normal; no masses,  no organomegaly  Extremities: atraumatic, no cyanosis, no edema, 2+ pulses palpated in all 4 extremities  Skin: color, texture, turgor within normal limits. No rashes or lesions or normal  Neurologic: speech appropriate, moves all 4 extremities, normal muscle strength and tone, CN 2-12 grossly intact    Labs  Lab Results   Component Value Date    WBC 6.4 04/19/2021    HGB 13.3 04/19/2021    HCT 42.7 04/19/2021     04/19/2021     04/19/2021    K 5.2 (H) 04/19/2021     04/19/2021    CREATININE 0.8 04/19/2021    BUN 21 04/19/2021    CO2 23 04/19/2021    GLUCOSE 130 (H) 04/19/2021    ALT 39 (H) 04/19/2021    AST 34 (H) 04/19/2021     Lab Results   Component Value Date    TSH 1.280 01/20/2021     Lab Results   Component Value Date    TRIG 200 (H) 01/20/2021    TRIG 98 11/29/2018    TRIG 119 05/29/2018     Lab Results   Component Value Date    HDL 40 01/20/2021    HDL 45 11/29/2018    HDL 36 05/29/2018     Lab Results   Component Value Date    LDLCALC 90 01/20/2021    LDLCALC 75 11/29/2018    LDLCALC 75 05/29/2018     Lab Results   Component Value Date    LABA1C 5.9 09/09/2021     No results found for: INR, PROTIME   *All recent labs were reviewed. Please see electronic chart for a more comprehensive set of labs    Radiology  XR SHOULDER RIGHT (MIN 2 VIEWS)    Result Date: 8/20/2021  EXAMINATION: THREE XRAY VIEWS OF THE RIGHT SHOULDER 8/20/2021 2:25 pm COMPARISON: None. HISTORY: ORDERING SYSTEM PROVIDED HISTORY: pain TECHNOLOGIST PROVIDED HISTORY: Reason for exam:->pain FINDINGS: There is no acute fracture or dislocation in the right shoulder. Mild degenerative changes are noted in the gleno humeral joint and AC joint.      The no acute fractures. Mild degenerative changes. XR HUMERUS RIGHT (MIN 2 VIEWS)    Result Date: 8/20/2021  EXAMINATION: TWO XRAY VIEWS OF THE RIGHT HUMERUS 8/20/2021 2:25 pm COMPARISON: None. HISTORY: ORDERING SYSTEM PROVIDED HISTORY: pain TECHNOLOGIST PROVIDED HISTORY: Reason for exam:->pain FINDINGS: No fracture, dislocation, or bony abnormality. As visualized, the soft tissues are negative     Negative right humerus. No fracture or acute disease. Health Maintenance Due   Topic Date Due    Shingles Vaccine (2 of 3) 12/21/2013    Colon Cancer Screen FIT/FOBT  09/06/2018    Pneumococcal 65+ years Vaccine (2 of 2 - PPSV23) 02/23/2019    DTaP/Tdap/Td vaccine (2 - Td or Tdap) 04/27/2020    Flu vaccine (1) 09/01/2021         Assessment and Plan  Judy Romero presents today to establish care. Mika Abdulaziz was seen today for established new doctor and discuss medications. Diagnoses and all orders for this visit:    Type 2 diabetes mellitus without complication, with long-term current use of insulin (HCC)  -     POCT glycosylated hemoglobin (Hb A1C)  -     Comprehensive Metabolic Panel; Future  -     Hemoglobin A1C; Future    Hyperlipidemia, unspecified hyperlipidemia type  -     Lipid Panel; Future    Essential hypertension  -     CBC Auto Differential; Future  -     Comprehensive Metabolic Panel; Future    Thyroid nodule  -     TSH with Reflex; Future    Up in 3 months, labs prior to next visit. Educational materials and/or home exercises printed for patient's review and were included in patient instructions on his/her After Visit Summary and given to patient at the end of visit. Counseled regarding above diagnosis, including possible risks and complications, especially if left uncontrolled.      Counseled regarding the possible side effects, risks, benefits and alternatives to treatment; patient and/or guardian verbalizes understanding, agrees, feels comfortable with and wishes to proceed with above treatment plan. Advised patient to call Pippa Molina new medication issues, and read all Rx info from pharmacy to assure aware of all possible risks and side effects of medication before taking. Reviewed age and gender appropriate health screening exams and vaccinations. Advised patient regarding importance of keeping up with recommended health maintenance and to schedule as soon as possible if overdue, as this is important in assessing for undiagnosed pathology, especially cancer, as well as protecting against potentially harmful/life threatening disease. Patient verbalizes understanding and agrees with above counseling, assessment and plan. All questions answered.     Sujit Pham DO  9/10/2021  1:33 PM

## 2021-09-15 ENCOUNTER — HOSPITAL ENCOUNTER (OUTPATIENT)
Dept: MRI IMAGING | Age: 71
Discharge: HOME | End: 2021-09-17
Payer: MEDICARE

## 2021-09-15 DIAGNOSIS — M75.101 TEAR OF RIGHT ROTATOR CUFF, UNSPECIFIED TEAR EXTENT, UNSPECIFIED WHETHER TRAUMATIC: ICD-10-CM

## 2021-09-15 PROCEDURE — 73221 MRI JOINT UPR EXTREM W/O DYE: CPT

## 2021-09-16 ENCOUNTER — OFFICE VISIT (OUTPATIENT)
Dept: ORTHOPEDIC SURGERY | Age: 71
End: 2021-09-16
Payer: MEDICARE

## 2021-09-16 VITALS — WEIGHT: 173 LBS | BODY MASS INDEX: 34.88 KG/M2 | HEIGHT: 59 IN

## 2021-09-16 DIAGNOSIS — Z71.82 EXERCISE COUNSELING: ICD-10-CM

## 2021-09-16 DIAGNOSIS — M75.101 TEAR OF RIGHT ROTATOR CUFF, UNSPECIFIED TEAR EXTENT, UNSPECIFIED WHETHER TRAUMATIC: Primary | ICD-10-CM

## 2021-09-16 DIAGNOSIS — S46.211A RUPTURE OF RIGHT PROXIMAL BICEPS TENDON, INITIAL ENCOUNTER: ICD-10-CM

## 2021-09-16 PROCEDURE — 4040F PNEUMOC VAC/ADMIN/RCVD: CPT | Performed by: ORTHOPAEDIC SURGERY

## 2021-09-16 PROCEDURE — 99214 OFFICE O/P EST MOD 30 MIN: CPT | Performed by: ORTHOPAEDIC SURGERY

## 2021-09-16 PROCEDURE — 3017F COLORECTAL CA SCREEN DOC REV: CPT | Performed by: ORTHOPAEDIC SURGERY

## 2021-09-16 PROCEDURE — G8417 CALC BMI ABV UP PARAM F/U: HCPCS | Performed by: ORTHOPAEDIC SURGERY

## 2021-09-16 PROCEDURE — 1123F ACP DISCUSS/DSCN MKR DOCD: CPT | Performed by: ORTHOPAEDIC SURGERY

## 2021-09-16 PROCEDURE — 1090F PRES/ABSN URINE INCON ASSESS: CPT | Performed by: ORTHOPAEDIC SURGERY

## 2021-09-16 PROCEDURE — G8427 DOCREV CUR MEDS BY ELIG CLIN: HCPCS | Performed by: ORTHOPAEDIC SURGERY

## 2021-09-16 PROCEDURE — 1036F TOBACCO NON-USER: CPT | Performed by: ORTHOPAEDIC SURGERY

## 2021-09-16 PROCEDURE — G8399 PT W/DXA RESULTS DOCUMENT: HCPCS | Performed by: ORTHOPAEDIC SURGERY

## 2021-09-16 NOTE — PROGRESS NOTES
Chief Complaint   Patient presents with    Shoulder Pain     RIght shoulder MRI follow up         HPI:    Patient is 70 y.o. female complaining of right shoulder pain and weakness for 2 weeks. She denies a specific traumatic injury to the right shoulder but almost tripped catching herself. Previous treatments include rest, ice, and anti-inflammatory medication and HEP without much relief. She denies any other orthopedic complaints. Follows up after MRI. ROS:    Skin: (-) rash,(-) psoriasis,(-) eczema, (-)skin cancer. Neurologic: (-)numbness, (-)tingling, (-)headaches, (-) LOC. Cardiovascular: (-) Chest pain, (-) swelling in legs/feet, (-) SOB, (-) cramping in legs/feet with walking.     All other review of systems negative except stated above or in HPI      Past Medical History:   Diagnosis Date    Acute [facility] [address] 07/11/08    CAD (coronary artery disease)     Dr. Arora Rater Carotid artery occlusion     Diabetes mellitus ([facility] [address]     Diverticulitis     Hyperlipidemia     Hypertension     Obese     Osteoarthritis     Thyroid nodule      Past Surgical History:   Procedure Laterality Date    CARPAL TUNNEL RELEASE      (L)    CARPAL TUNNEL RELEASE Right 12/13/2019    RIGHT CARPAL TUNNEL RELEASE ENDOSCOPIC performed by Evelyne Ramos MD at [facility] CATH LAB PROCEDURE      stent placed    [place] Right 12/13/2019    RIGHT THUMB TRIGGER RELEASE performed by Evelyne Ramos MD at [facility]  05/2017    LT knee torn meniscus / DR Espinal  2017    THYROIDECTOMY N/A 01/16/2020    TOTAL THYROIDECTOMY--NERVE INTEGRITY MONITOR performed by Kay Damon DO at [facility]  07/02/2021    severe gastritis--jose angel    UPPER GASTROINTESTINAL ENDOSCOPY N/A 7/2/2021    EGD BIOPSY performed by Marv Mota MD at [facility] ENDOSCOPY       Current Outpatient Medications:     ibuprofen (ADVIL;MOTRIN) 800 MG tablet, Take 1 tablet by mouth 2 times daily as needed for Pain, Disp: 60 tablet, Rfl: 0    blood glucose test strips (ACCU-CHEK STEVEN PLUS) strip, TEST TWO TIMES DAILY, Disp: 200 strip, Rfl: 5    atenolol (TENORMIN) 50 MG tablet, Take 1 tablet by mouth daily, Disp: 90 tablet, Rfl: 1    levothyroxine (SYNTHROID) 100 MCG tablet, TAKE 1 TABLET EVERY DAY, Disp: 90 tablet, Rfl: 1    pantoprazole (PROTONIX) 40 MG tablet, Take 1 tablet by mouth 2 times daily (Patient taking differently: Take 40 mg by mouth 2 times daily Patient taking once daily), Disp: 180 tablet, Rfl: 1    Alcohol Swabs (B-D SINGLE USE SWABS REGULAR) PADS, USE AS DIRECTED AS NEEDED  TO TEST BLOOD SUGAR, Disp: 100 each, Rfl: 5    venlafaxine (EFFEXOR XR) 150 MG extended release capsule, TAKE 1 CAPSULE EVERY DAY, Disp: 90 capsule, Rfl: 3    Accu-Chek Softclix Lancets MISC, TEST TWO TIMES DAILY, Disp: 200 each, Rfl: 2    quinapril (ACCUPRIL) 5 MG tablet, TAKE 1 TABLET EVERY NIGHT, Disp: 90 tablet, Rfl: 2    simvastatin (ZOCOR) 80 MG tablet, TAKE 1 TABLET EVERY NIGHT, Disp: 90 tablet, Rfl: 2    Insulin Pen Needle (DROPLET PEN NEEDLES) 31G X 6 MM MISC, Inject 1 each into the skin 3 times daily, Disp: 100 each, Rfl: 5    acetaminophen (TYLENOL) 500 MG tablet, Take 1 tablet by mouth 4 times daily as needed for Pain, Disp: 360 tablet, Rfl: 1    aspirin 81 MG EC tablet, Take 1 tablet by mouth daily LD 12-4-19 (Patient taking differently: Take 81 mg by mouth daily ), Disp: 90 tablet, Rfl: 1    Blood Glucose Monitoring Suppl (ACCU-CHEK STEVEN PLUS) w/Device KIT, CHECK BLOOD SUGAR TWICE DAILY, Disp: 1 kit, Rfl: 0  No Known Allergies  Social History     Socioeconomic History    Marital status:      Spouse name: Not on file    Number of children: Not on file    Years of education: Not on file    Highest education level: Not on file   Occupational History    Occupation: retired    Tobacco Use    Smoking status: Former Smoker     Packs/day: 1.00 Years: 43.00     Pack years: 43.00     Start date: 1968     Quit date: 3/18/2011     Years since quitting: 10.5    Smokeless tobacco: Never Used   Vaping Use    Vaping Use: Never used   Substance and Sexual Activity    Alcohol use: No    Drug use: No    Sexual activity: Yes   Other Topics Concern    Not on file   Social History Narrative    Not on file     Social Determinants of Health     Financial Resource Strain:     Difficulty of Paying Living Expenses:    Food Insecurity:     Worried About Running Out of Food in the Last Year:     Ran Out of Food in the Last Year:    Transportation Needs:     Lack of Transportation (Medical):  Lack of Transportation (Non-Medical):    Physical Activity:     Days of Exercise per Week:     Minutes of Exercise per Session:    Stress:     Feeling of Stress :    Social Connections:     Frequency of Communication with Friends and Family:     Frequency of Social Gatherings with Friends and Family:     Attends Spiritism Services:     Active Member of Clubs or Organizations:     Attends Club or Organization Meetings:     Marital Status:    Intimate Partner Violence:     Fear of Current or Ex-Partner:     Emotionally Abused:     Physically Abused:     Sexually Abused:      Family History   Problem Relation Age of Onset    Heart Disease Mother     Heart Disease Father     Diabetes Father     Heart Disease Sister     Heart Disease Maternal Grandmother     Heart Disease Paternal Grandmother     Diabetes Paternal Grandmother            Physical Exam:    Ht 4' 11\" (1.499 m)   Wt 173 lb (78.5 kg)   BMI 34.94 kg/m²     GENERAL: alert, appears stated age, cooperative, no acute distress    HEENT: Head is normocephalic, atraumatic. PERRLA. SKIN: Clean, dry, intact. There is not any cellulitis or cutaneous lesions noted in the upper extremities    PULMONARY: breathing is regular and unlabored, no acute distress    CV:  The bilateral upper and lower extremities are warm and well-perfused with brisk capillary refill. 2+ pulses UE and LE bilateral.     PSYCHIATRY: Pleasant mood, appropriate behavior, follows commands    NEURO: Sensation is intact distally with light touch with no alteration. Motor exam of the upper extremities show elbow flexion and extension, wrist flexion and extension, and finger abduction grossly intact 5/5. Upper extremity reflexes are bilaterally symmetrical and within normal limits. LYMPH: No lymphedema present distally in upper or lower extremity. MUSCULOSKELETAL:  Shoulder Exam:  Examination of the right shoulder shows: There is not a deformity. There is not erythema. There is not soft tissue swelling. Deltoid region is  tender to palpation. AC Joint is  tender to palpation. Clavicle is not tender to palpation. Bicipital Groove is  tender to palpation. Pectoralis  is not tender to palpation. Scapula/ trapezius is  tender to palpation. Left:  ROM Full, Strength: Supraspinatus 5/5, Infraspinatus 5/5, Subscapularis 5/5  Right:  ROM 90/90/60, Strength: Supraspinatus 4/5, Infraspinatus 5/5, Subscapularis 5/5  Left Shoulder:  Crepitus:  no   Tenderness:  none   Effusion:   none   Impingement: negative   Empty Can:  negative   Speed's:  negative      Apprehension:  negative   Cross Arm Sign:  negative   Gooding's:  negative       Neer's:  negative      Belly Press Test:  negative      Drop Arm Test:  negative     Right Shoulder:  Crepitus:  no   Tenderness:  mild   Effusion:   non   Impingement: positive   Empty Can:  positive   Speed's: positive   Apprehension:  not tested   Cross Arm Sign:  positive   Gooding's:  positive      Neer's:  positive      Belly Press Test:  negative   Drop Arm Test:  positive     no change since last visit. Imaging:  XR SHOULDER RIGHT (MIN 2 VIEWS)    Result Date: 8/20/2021  EXAMINATION: THREE XRAY VIEWS OF THE RIGHT SHOULDER 8/20/2021 2:25 pm COMPARISON: None.  HISTORY: ORDERING SYSTEM PROVIDED HISTORY: pain TECHNOLOGIST PROVIDED HISTORY: Reason for exam:->pain FINDINGS: There is no acute fracture or dislocation in the right shoulder. Mild degenerative changes are noted in the gleno humeral joint and AC joint. The no acute fractures. Mild degenerative changes. XR HUMERUS RIGHT (MIN 2 VIEWS)    Result Date: 8/20/2021  EXAMINATION: TWO XRAY VIEWS OF THE RIGHT HUMERUS 8/20/2021 2:25 pm COMPARISON: None. HISTORY: ORDERING SYSTEM PROVIDED HISTORY: pain TECHNOLOGIST PROVIDED HISTORY: Reason for exam:->pain FINDINGS: No fracture, dislocation, or bony abnormality. As visualized, the soft tissues are negative     Negative right humerus. No fracture or acute disease. MRI SHOULDER RIGHT WO CONTRAST    Result Date: 9/15/2021  EXAMINATION: MRI OF THE RIGHT SHOULDER WITHOUT CONTRAST,   9/15/2021 3:08 pm TECHNIQUE: Multiplanar multisequence MRI of the right shoulder was performed without the administration of intravenous contrast. COMPARISON: Radiographs 08/20/2021 HISTORY: ORDERING SYSTEM PROVIDED HISTORY:  Tear of right rotator cuff, unspecified tear extent, unspecified whether traumatic. TECHNOLOGIST PROVIDED HISTORY: What reading provider will be dictating this exam?  CRC FINDINGS: ROTATOR CUFF: No rotator cuff tear identified. Mild supraspinatus and infraspinatus tendinosis. Mild teres minor muscle atrophy. The remaining rotator cuff muscle bulk appears to be within normal limits. BICEPS TENDON: The long head biceps tendon is not definitively seen within the bicipital groove or intra-articularly. LABRUM: No large paralabral cyst is seen. There is degeneration of the labrum. GLENOHUMERAL JOINT: No significant degenerative changes are seen. Trace joint fluid. AC JOINT AND ACROMIOCLAVICULAR ARCH: Mild AC joint degenerative changes with trace subacromial/subdeltoid bursal fluid. BONE MARROW: No acute fracture or significant bone marrow edema. OUTLET SPACES: Unremarkable.      The long head biceps tendon is not definitively seen. This raises suspicion for a full-thickness retracted tear. Mild supraspinatus and infraspinatus tendinosis without a rotator cuff tear identified. Mild AC joint degenerative changes. Noel Muñoz was seen today for shoulder pain. Diagnoses and all orders for this visit:    Tear of right rotator cuff, unspecified tear extent, unspecified whether traumatic    Rupture of right proximal biceps tendon, initial encounter    Exercise counseling        Patient seen and examined. X-rays reviewed. Patient has exam and history consistent with rotator cuff pathology. MRI recommended for further evaluation and management of possible rotator cuff tear. MRI reviewed with patient in detail. Natural history and course discussed with patient in long discussion  Treatment options discussed with patient in detail including risks and benefits. Patient should do well with conservative management as patient would like to avoid surgery at this time. Senia pain cream    In a 15 minute assessment and discussion, patient was counseled on continued weight loss, diet, and physical activity relating to this condition. She was educated with options in detail including nutrition, joining a health club/ weight loss program, and use of cardio equipment such as the Arc Trainer and the importance of use as well as range of motion and HEP exercises for weight loss. Michelle Velasquez DO            25 minutes was spent with patient. 50% or greater was spent counseling the patient.

## 2021-09-20 DIAGNOSIS — M75.101 TEAR OF RIGHT ROTATOR CUFF, UNSPECIFIED TEAR EXTENT, UNSPECIFIED WHETHER TRAUMATIC: Primary | ICD-10-CM

## 2021-09-20 DIAGNOSIS — S46.211A RUPTURE OF RIGHT PROXIMAL BICEPS TENDON, INITIAL ENCOUNTER: ICD-10-CM

## 2021-09-29 ENCOUNTER — PROCEDURE VISIT (OUTPATIENT)
Dept: SURGERY | Age: 71
End: 2021-09-29
Payer: MEDICARE

## 2021-09-29 VITALS
OXYGEN SATURATION: 96 % | HEART RATE: 60 BPM | TEMPERATURE: 97.2 F | DIASTOLIC BLOOD PRESSURE: 70 MMHG | SYSTOLIC BLOOD PRESSURE: 122 MMHG | RESPIRATION RATE: 24 BRPM

## 2021-09-29 DIAGNOSIS — L57.0 AK (ACTINIC KERATOSIS): Primary | ICD-10-CM

## 2021-09-29 PROCEDURE — 17000 DESTRUCT PREMALG LESION: CPT | Performed by: PLASTIC SURGERY

## 2021-09-30 ENCOUNTER — OFFICE VISIT (OUTPATIENT)
Dept: FAMILY MEDICINE CLINIC | Age: 71
End: 2021-09-30
Payer: MEDICARE

## 2021-09-30 ENCOUNTER — CARE COORDINATION (OUTPATIENT)
Dept: CARE COORDINATION | Age: 71
End: 2021-09-30

## 2021-09-30 VITALS
WEIGHT: 173 LBS | TEMPERATURE: 97.6 F | HEIGHT: 59 IN | SYSTOLIC BLOOD PRESSURE: 130 MMHG | OXYGEN SATURATION: 97 % | BODY MASS INDEX: 34.88 KG/M2 | RESPIRATION RATE: 16 BRPM | DIASTOLIC BLOOD PRESSURE: 74 MMHG | HEART RATE: 72 BPM

## 2021-09-30 DIAGNOSIS — E11.9 TYPE 2 DIABETES MELLITUS WITHOUT COMPLICATION, WITH LONG-TERM CURRENT USE OF INSULIN (HCC): Primary | ICD-10-CM

## 2021-09-30 DIAGNOSIS — B37.9 YEAST INFECTION: ICD-10-CM

## 2021-09-30 DIAGNOSIS — Z59.9 FINANCIAL DIFFICULTIES: ICD-10-CM

## 2021-09-30 DIAGNOSIS — Z79.4 TYPE 2 DIABETES MELLITUS WITHOUT COMPLICATION, WITH LONG-TERM CURRENT USE OF INSULIN (HCC): Primary | ICD-10-CM

## 2021-09-30 PROCEDURE — 3017F COLORECTAL CA SCREEN DOC REV: CPT | Performed by: INTERNAL MEDICINE

## 2021-09-30 PROCEDURE — 1036F TOBACCO NON-USER: CPT | Performed by: INTERNAL MEDICINE

## 2021-09-30 PROCEDURE — G8417 CALC BMI ABV UP PARAM F/U: HCPCS | Performed by: INTERNAL MEDICINE

## 2021-09-30 PROCEDURE — 4040F PNEUMOC VAC/ADMIN/RCVD: CPT | Performed by: INTERNAL MEDICINE

## 2021-09-30 PROCEDURE — 1123F ACP DISCUSS/DSCN MKR DOCD: CPT | Performed by: INTERNAL MEDICINE

## 2021-09-30 PROCEDURE — G8399 PT W/DXA RESULTS DOCUMENT: HCPCS | Performed by: INTERNAL MEDICINE

## 2021-09-30 PROCEDURE — 3044F HG A1C LEVEL LT 7.0%: CPT | Performed by: INTERNAL MEDICINE

## 2021-09-30 PROCEDURE — 99213 OFFICE O/P EST LOW 20 MIN: CPT | Performed by: INTERNAL MEDICINE

## 2021-09-30 PROCEDURE — 2022F DILAT RTA XM EVC RTNOPTHY: CPT | Performed by: INTERNAL MEDICINE

## 2021-09-30 PROCEDURE — G8427 DOCREV CUR MEDS BY ELIG CLIN: HCPCS | Performed by: INTERNAL MEDICINE

## 2021-09-30 PROCEDURE — 1090F PRES/ABSN URINE INCON ASSESS: CPT | Performed by: INTERNAL MEDICINE

## 2021-09-30 RX ORDER — FLUCONAZOLE 100 MG/1
100 TABLET ORAL DAILY
Qty: 7 TABLET | Refills: 0 | Status: SHIPPED | OUTPATIENT
Start: 2021-09-30 | End: 2021-10-07

## 2021-09-30 SDOH — ECONOMIC STABILITY: FOOD INSECURITY: WITHIN THE PAST 12 MONTHS, YOU WORRIED THAT YOUR FOOD WOULD RUN OUT BEFORE YOU GOT MONEY TO BUY MORE.: NEVER TRUE

## 2021-09-30 SDOH — ECONOMIC STABILITY: FOOD INSECURITY: WITHIN THE PAST 12 MONTHS, THE FOOD YOU BOUGHT JUST DIDN'T LAST AND YOU DIDN'T HAVE MONEY TO GET MORE.: NEVER TRUE

## 2021-09-30 SDOH — ECONOMIC STABILITY - INCOME SECURITY: PROBLEM RELATED TO HOUSING AND ECONOMIC CIRCUMSTANCES, UNSPECIFIED: Z59.9

## 2021-09-30 ASSESSMENT — SOCIAL DETERMINANTS OF HEALTH (SDOH)
HOW HARD IS IT FOR YOU TO PAY FOR THE VERY BASICS LIKE FOOD, HOUSING, MEDICAL CARE, AND HEATING?: NOT HARD AT ALL
HOW HARD IS IT FOR YOU TO PAY FOR THE VERY BASICS LIKE FOOD, HOUSING, MEDICAL CARE, AND HEATING?: VERY HARD

## 2021-09-30 NOTE — CARE COORDINATION
Initial Contact Social Work Note - Ambulatory  9/30/2021      Date of referral: 9/30/2021  Referral received from: PCP Dr. Manisha Figueroa  Reason for referral: financial difficulties    Previous SW referral: No  If yes, brief summary of outcome: N/A    Two Identifiers Verified: No    Insurance Provider: [employer]    Support System:Yes     Status: No    Community Providers: No    ADL Assistance Needed: No    Housing/Living Concerns or Home Modification Needs: N/A    Transportation Concern: Yes    Medication Cost Concern: Yes    Medication Adherence Concern: No    Financial Concern(s): medications    Income (only if applicable): N/A    Ability to Read/Write: N/A    Advance Care Plan:  N/A    Other: No    Identified Needs:    PAP referral for prescription assistance   transportation    Social Work Plan:   [facility] made referral to [address] PAP   [facility] reviewed transportation resources   Next Steps: [facility] to f/u next week    Goals Addressed    None      [facility] made call to pt, to initiate SW referral, introduced self/role and reviewed referral. Pt reports trouble paying for medications. Pt reports she has Social Security, Medicare and [employer] and she is currently in the donut hole. Pt reports she needs to pay $700 d/t donut hole in order to get her prescription covered. Pt reports her Diabetes medication called Victoza is expensive and pt has other medications. Pt reports her  is still working and has insurance but it does not pay for medications. Pt reports her  had a 6bypass and is on medications. Pt reports she was looking into [employer] program from the makers of her Victoza medication. CC explained  PAP and pt is agreeable to referral. Pt reports she has transportation sometimes as her and her  live with her mother in law and they use her car when able. Pt reports she is aware of [employer] transportation services but her sister used it in the past and they did not show up for transport.  [facility] also explained transportation options of [address] appts only) and [address] rides to medical appointments (until funding runs out). [facility] to text pt the phone number for [address] as well as pt did not have anything to write this down. Pt reports no issues with paying for rent/utilities as she and her  reside with her mother in law, as they help her out. No other questions/concerns reported at this time. [facility] to f/u. [facility] made call to [facility] [address] PAP, spoke with Linda, placed referral.    [facility] text messaged pt [employer] [facility] and phone number [phone].     Irene HERNANDEZ, [place]   Care Coordinator  [phone]

## 2021-09-30 NOTE — PROGRESS NOTES
Diabetes mellitus ([facility]     Diverticulitis     Hyperlipidemia     Hypertension     Obese     Osteoarthritis     Thyroid nodule        Past Surgical Hx:  Past Surgical History:   Procedure Laterality Date    CARPAL TUNNEL RELEASE      (L)    CARPAL TUNNEL RELEASE Right 12/13/2019    RIGHT CARPAL TUNNEL RELEASE ENDOSCOPIC performed by Alejandro Rose MD at [address] [facility] CATH LAB PROCEDURE      stent placed    [place] Right 12/13/2019    RIGHT THUMB TRIGGER RELEASE performed by Alejandro Rose MD at [facility]  05/2017    LT knee torn meniscus / DR Espinal  2017    THYROIDECTOMY N/A 01/16/2020    TOTAL THYROIDECTOMY--NERVE INTEGRITY MONITOR performed by Shaggy Corona DO at [facility]  07/02/2021    severe gastritis--jose angel    UPPER GASTROINTESTINAL ENDOSCOPY N/A 7/2/2021    EGD BIOPSY performed by Daniel Nicole MD at [place] [address] Hx:  Family History   Problem Relation Age of Onset    Heart Disease Mother     Heart Disease Father     Diabetes Father     Heart Disease Sister     Heart Disease Maternal Grandmother     Heart Disease Paternal Grandmother     Diabetes Paternal Grandmother        Social Hx:  Social History     Tobacco Use    Smoking status: Former Smoker     Packs/day: 1.00     Years: 43.00     Pack years: 43.00     Start date: 1968     Quit date: 3/18/2011     Years since quitting: 10.5    Smokeless tobacco: Never Used   Substance Use Topics    Alcohol use: No       Home Medications:  Current Outpatient Medications   Medication Sig Dispense Refill    fluconazole (DIFLUCAN) 100 MG tablet Take 1 tablet by mouth daily for 7 days 7 tablet 0    Alcohol Swabs (B-D SINGLE USE SWABS REGULAR) PADS Apply 1 each topically 3 times daily USE AS DIRECTED AS NEEDED  TO TEST BLOOD SUGAR 300 each 5    blood glucose test strips (ACCU-CHEK STEVEN PLUS) strip TEST TWO TIMES DAILY 200 conjunctivae/corneas clear, EOMI  Mouth: Mucous membranes moist with no pharyngeal exudate or erythema, slightly enlarged tongue with wavy borders and white discoloration along the edges  Neck: no JVD, no adenopathy, no carotid bruit, supple, symmetrical, trachea midline  Back: symmetric, ROM normal, No CVA tenderness. Lungs: clear to auscultation bilaterally without wheezes, rales, or rhonchi  Heart: regular rate and rhythm, S1, S2 normal, no murmur, click, rub or gallop  Abdomen: soft, non-tender; bowel sounds normal; no masses,  no organomegaly  Extremities: atraumatic, no cyanosis, no edema, 2+ pulses palpated in all 4 extremities  Skin: color, texture, turgor within normal limits. No rashes or lesions or normal  Neurologic:speech appropriate, moves all 4 extremities, normal muscle strength and tone, CN 2-12 grossly intact    Labs:  Lab Results   Component Value Date    WBC 6.4 04/19/2021    HGB 13.3 04/19/2021    HCT 42.7 04/19/2021     04/19/2021     04/19/2021    K 5.2 (H) 04/19/2021     04/19/2021    CREATININE 0.8 04/19/2021    BUN 21 04/19/2021    CO2 23 04/19/2021    GLUCOSE 130 (H) 04/19/2021    ALT 39 (H) 04/19/2021    AST 34 (H) 04/19/2021     Lab Results   Component Value Date    TSH 1.280 01/20/2021     Lab Results   Component Value Date    TRIG 200 (H) 01/20/2021    TRIG 98 11/29/2018    TRIG 119 05/29/2018     Lab Results   Component Value Date    HDL 40 01/20/2021    HDL 45 11/29/2018    HDL 36 05/29/2018     Lab Results   Component Value Date    LDLCALC 90 01/20/2021    LDLCALC 75 11/29/2018    LDLCALC 75 05/29/2018     Lab Results   Component Value Date    LABA1C 5.9 09/09/2021     No results found for: INR, PROTIME   *All recent labs were reviewed.  Please see electronic chart for a more comprehensive set of labs    Radiology:  MRI SHOULDER RIGHT WO CONTRAST    Result Date: 9/16/2021  EXAMINATION: MRI OF THE RIGHT SHOULDER WITHOUT CONTRAST,   9/15/2021 3:08 pm TECHNIQUE: 2 diabetes mellitus without complication, with long-term current use of insulin (HCC)    Yeast infection  -     fluconazole (DIFLUCAN) 100 MG tablet; Take 1 tablet by mouth daily for 7 days    Financial difficulties  -     [facility] Referral to Social Work    -Forms were signed for [place] financial assistance. Patient is refusing to go on any oral medications at this time and prefers to only be treated with Victoza. -Patient has some degree of thrush in her mouth which has been a recurrent issue for her. She has unable to tolerate any swish and spit options and has not undergone any further evaluation with GI or a dentist due to her extreme gag reflex. Educational materials and/or home exercises printed for patient's review and were included in patient instructions on his/her After Visit Summary and given to patient at the end of visit. Counseled regarding above diagnosis, including possible risks and complications, especially if left uncontrolled. Counseled regarding the possible side effects, risks, benefits and alternatives to treatment; patient and/or guardian verbalizes understanding, agrees, feels comfortable with and wishes to proceed with above treatment plan. Advised patient to call Dick Marlena new medication issues, and read all Rx info from pharmacy to assure aware of all possible risks and side effects of any medication before taking. Patient verbalizes understanding and agrees with above counseling, assessment and plan. All questions answered.     Pina Vail DO   10/1/2021  3:34 PM

## 2021-10-01 RX ORDER — LIDOCAINE HYDROCHLORIDE AND EPINEPHRINE 10; 10 MG/ML; UG/ML
20 INJECTION, SOLUTION INFILTRATION; PERINEURAL ONCE
Status: SHIPPED | OUTPATIENT
Start: 2021-10-01

## 2021-10-01 NOTE — PROGRESS NOTES
Subjective: Follow up today for Er YAG laser ablation to left mid face Actinic Keratosis. Denies fever, nausea, vomiting, leg pain or swelling. The patient voices understanding of the procedure they are having today and would like to proceed. Objective:    /70 (Site: Left Upper Arm, Position: Sitting, Cuff Size: Medium Adult)   Pulse 60   Temp 97.2 °F (36.2 °C) (Skin)   Resp 24   SpO2 96%       Left mid face actinic keratosis  Lesion- 4mm x 4mm      Assessment:    Patient Active Problem List   Diagnosis    Hypertension    Hyperlipidemia    Carotid artery stenosis    Type 2 diabetes mellitus without complication (HCC)    Anxiety and depression    Polyarthralgia    Arthritis of both knees    Swelling of left hand    Left hand pain    Thyroid nodule    Major depressive disorder, single episode, moderate (HCC)    Heartburn    Indigestion       Plan:       Diagnosis  -) Left mid face Actinic Keratosis      After consent was obtained, the area was cleansed with betadine. Local anesthesia consisting of 1% lidocaine with 1:100,000 epinepherine was injected surrounding the area. The local was allowed to work. The procedure was carried out By Dr Lyons Kill    Risks of laser therapy were explained including bleeding, scarring, hyopigmentation, hyperpigmentation that can be worsened by sun exposure. There is a risk of herpes or bacterial infection. The patient is educated to utilize sun protection for 3-4 months after the procedure, understands that there will be some pain involved during the procedure. Diet: regular diet  Activity: activity as tolerated  Shower/Bathing: OK to shower at this time . Advised the patient that they can allow soap and water to rinse of the incision site while showering. Once they are done in the shower they are to pat dry the incision site with a clean paper towel. No baths, hot tubs or soaking of the wound site at this time. Pt voices understanding.    Dressings Meme Mercury /Wound Care: keep wound clean and dry apply  bacitracin to the wound site BID and cover with a gauze dressing PRN  Medications: OTC pain control PRN  Educated to contact physician with concerns or signs of infection (redness, increasing pain, discharge, odor, fever).       Follow Up 7 days    Tiffany Urrutia MD

## 2021-10-08 ENCOUNTER — CARE COORDINATION (OUTPATIENT)
Dept: CARE COORDINATION | Age: 71
End: 2021-10-08

## 2021-10-08 NOTE — CARE COORDINATION
[facility] made f/u call to pt. Pt reports she has not spoken with  PAP d/t missing their call. Pt reports she has PAP phone number to return call. Pt reports she had just woken up. Pt has transportation resources that this [facility] provided her in last call. Pt did not have any questions/needs. [facility] explained this is [facility] final outreach and encouraged pt to call [facility] with any SW needs. [facility] to sign off. [facility] routed note to PCP.     Jake HERNANDEZ, [place]   Care Coordinator  [phone]

## 2021-10-11 ENCOUNTER — EVALUATION (OUTPATIENT)
Dept: PHYSICAL THERAPY | Age: 71
End: 2021-10-11
Payer: MEDICARE

## 2021-10-11 ENCOUNTER — VIRTUAL VISIT (OUTPATIENT)
Dept: SURGERY | Age: 71
End: 2021-10-11

## 2021-10-11 DIAGNOSIS — S46.211A RUPTURE OF RIGHT PROXIMAL BICEPS TENDON, INITIAL ENCOUNTER: ICD-10-CM

## 2021-10-11 DIAGNOSIS — M75.101 TEAR OF RIGHT ROTATOR CUFF, UNSPECIFIED TEAR EXTENT, UNSPECIFIED WHETHER TRAUMATIC: Primary | ICD-10-CM

## 2021-10-11 DIAGNOSIS — L57.0 AK (ACTINIC KERATOSIS): Primary | ICD-10-CM

## 2021-10-11 PROCEDURE — 99999 PR OFFICE/OUTPT VISIT,PROCEDURE ONLY: CPT | Performed by: PHYSICIAN ASSISTANT

## 2021-10-11 PROCEDURE — 97162 PT EVAL MOD COMPLEX 30 MIN: CPT | Performed by: PHYSICAL THERAPIST

## 2021-10-11 NOTE — PROGRESS NOTES
[facility] OUTPATIENT REHABILITATION  PHYSICAL THERAPY INITIAL EVALUATION         Date:  10/11/2021   Patient: Rayne German  : 1950  MRN: <[id number]>  Referring Provider: Kee Asencio DO  1932 [address],  [facility]     Medical Diagnosis:      Diagnosis Orders   1. Tear of right rotator cuff, unspecified tear extent, unspecified whether traumatic     2. Rupture of right proximal biceps tendon, initial encounter         Physician Order: Eval and Treat      SUBJECTIVE:     Onset date: 1 month    Onset: Sudden     History / Mechanism of Injury: Pt tried to avoid tripping over car seat with a child in it and twisted her arm awkwardly. Interventions for current problem: none    Chief complaint: pain, decreased motion, decreased mobility, weakness    Behavior: condition is staying the same    Pain: intermittent  Current: 0/10     Best: 0/10     Worst:7/10 (occurs with elbow and shoulder flexion). Pain returns to baseline in a few minutes    Symptom Type / Quality: aching, sharp  Location[de-identified] noted above anterior, biceps        Aggravated by: reaching overhead, lifting/carrying/material handling    Relieved by: ice    Imaging results: MRI SHOULDER RIGHT WO CONTRAST    Result Date: 2021  EXAMINATION: MRI OF THE RIGHT SHOULDER WITHOUT CONTRAST,   9/15/2021 3:08 pm TECHNIQUE: Multiplanar multisequence MRI of the right shoulder was performed without the administration of intravenous contrast. COMPARISON: Radiographs 2021 HISTORY: ORDERING SYSTEM PROVIDED HISTORY:  Tear of right rotator cuff, unspecified tear extent, unspecified whether traumatic. TECHNOLOGIST PROVIDED HISTORY: What reading provider will be dictating this exam?  CRC FINDINGS: ROTATOR CUFF: No rotator cuff tear identified. Mild supraspinatus and infraspinatus tendinosis. Mild teres minor muscle atrophy. The remaining rotator cuff muscle bulk appears to be within normal limits.  BICEPS Swabs (B-D SINGLE USE SWABS REGULAR) PADS Apply 1 each topically 3 times daily USE AS DIRECTED AS NEEDED  TO TEST BLOOD SUGAR 300 each 5    ibuprofen (ADVIL;MOTRIN) 800 MG tablet Take 1 tablet by mouth 2 times daily as needed for Pain 60 tablet 0    blood glucose test strips (ACCU-CHEK STEVEN PLUS) strip TEST TWO TIMES DAILY 200 strip 5    atenolol (TENORMIN) 50 MG tablet Take 1 tablet by mouth daily 90 tablet 1    levothyroxine (SYNTHROID) 100 MCG tablet TAKE 1 TABLET EVERY DAY 90 tablet 1    pantoprazole (PROTONIX) 40 MG tablet Take 1 tablet by mouth 2 times daily (Patient taking differently: Take 40 mg by mouth 2 times daily Patient taking once daily) 180 tablet 1    venlafaxine (EFFEXOR XR) 150 MG extended release capsule TAKE 1 CAPSULE EVERY DAY 90 capsule 3    Accu-Chek Softclix Lancets MISC TEST TWO TIMES DAILY 200 each 2    quinapril (ACCUPRIL) 5 MG tablet TAKE 1 TABLET EVERY NIGHT 90 tablet 2    simvastatin (ZOCOR) 80 MG tablet TAKE 1 TABLET EVERY NIGHT 90 tablet 2    Insulin Pen Needle (DROPLET PEN NEEDLES) 31G X 6 MM MISC Inject 1 each into the skin 3 times daily 100 each 5    acetaminophen (TYLENOL) 500 MG tablet Take 1 tablet by mouth 4 times daily as needed for Pain 360 tablet 1    aspirin 81 MG EC tablet Take 1 tablet by mouth daily LD 12-4-19 (Patient taking differently: Take 81 mg by mouth daily ) 90 tablet 1    Blood Glucose Monitoring Suppl (ACCU-CHEK STEVEN PLUS) w/Device KIT CHECK BLOOD SUGAR TWICE DAILY (Patient not taking: Reported on 9/30/2021) 1 kit 0     Current Facility-Administered Medications   Medication Dose Route Frequency Provider Last Rate Last Admin    lidocaine-EPINEPHrine 1 %-[id number] injection 20 mL  20 mL IntraDERmal Once Christine Mendes MD           Occupation: retired. Physical demands include: n/a. Status: n/a.     Exercise regimen: none    Hobbies: yard work, working around the house    Patient Goals: pain relief, return to prior activity, get back to normal    Precautions/Contraindications: none    OBJECTIVE:     Estimated body mass index is 34.94 kg/m² as calculated from the following:    Height as of 9/30/21: 4' 11\" (1.499 m). Weight as of 9/30/21: 173 lb (78.5 kg). Observations: well nourished female    Inspection: irregular scapulohumeral rhythm right shoulder      Joint/Motion:    Neck:  Neck AROM is WNL and non-provocative     Right Shoulder:  AROM: 135° Forward elevation,  40° ER,  IR to L3  PROM: 140° Forward elevation,  45° ER,  L1° IR    Left Shoulder:  AROM: WFL° Forward elevation,  WFL° ER,  IR to [facility]  PROM: WFL° Forward elevation,  WFL° ER , WFL° IR    Strength:  Right Shoulder: Flexion 3-/5,  Abduction 3-/5, ER 3-/5, IR 3-/5      Left Shoulder: Flexion 3+/5,  Abduction 3+/5, ER 3+/5, IR 3+/5     Palpation: mid and proximal biceps    Special Tests/Functional Screens:    [] Cecilia-Erik []+ / [] -  [] Sibley's []+ / [] -   []  German's []+ / [] -    []GH drawer []+ / [] -    [] Bicep Load []+ / [] -   [] Crank []+ / [] -  [] Maryuri Fernandoick []+ / [] -   [] Elbow Varus []+ / [] -  [] Neer's []+ / [] -      [] Speed's []+ / [] -   []Sulcus Sign []+ / [] -   [] Apprehension []+ / [] -   [] Bicep Load II []+ / [] -   [] Oscar Duffy []+ / [] -   [] Elbow Valgus []+ / [] -     [] Other: []+ / [] -         ASSESSMENT     Pt to benefit from skilled PT services in order to improve ROM, strength, functional mobility, endurance, and decrease pain in R shoulder/biceps. Pt globally stiff and weak with all shoulder movements with stretches to improve end range mobility and strengthening exercises to target weakness.      Outcome Measure:   QuickDASH (Disorders of the Arm, Shoulder, and Hand) 27% disability    Problems:   Pain 7/10 intermittent  ROM decreased  Strength decreased   Limitations with ADLs , use of right upper extremity, bending, reaching, lifting, carrying      [x] There are no barriers affecting plan of care or recovery    [] Barriers to this patient's plan of care or recovery include:    Domestic Concerns:  [x] No  [] Yes:    Short Term goals (2-3 weeks)   Pain 4/10   ROM WFL   Strength 3+/5 R shoulder/biceps    Able to perform / complete the following functions / tasks: hobbies, yard work, reach / lift / carry light weighted items in performance of home or work demands, return to exercise regimen  with less pain.  QuickDASH (Disorders of the Arm, Shoulder, and Hand) 20% disability    Long Term goals (4-6 weeks)   Pain 0-3/10   ROM WNL   Strength 4/5 R shoulder/biceps    Able to perform / complete the following functions / tasks: ADLs, hobbies, yard work, reach / lift / carry medium weighted items in performance of home or work demands, return to exercise regimen  with no/minimal pain.  QuickDASH 0-10% disability   Independent with Home Exercise Programs    Rehab Potential: [x] Good  [] Fair  [] Poor    PLAN       Treatment Plan:   instruction in home exercise program   therapeutic exercise   therapeutic activity   neuromuscular re-education   manual therapy   cold / hot pack  electrical stimulation     The following CPT codes are likely to be used in the care of this patient:   54792 PT Evaluation: Moderate Complexity   14266 PT Re-Evaluation   96351 Therapeutic Exercise   99682 Neuromuscular Re-Education   87229 Therapeutic Activities   52750 Manual Therapy    Electrical Stimulation    Suggested Professional Referral: [x] No  [] Yes:     Patient Education:  [x] Plans / Goals, Risks / Benefits discussed  [x] Home exercise program  Method of Education: [x] Verbal  [x] Demo  [x] Written  Comprehension of Education:  [x] Verbalizes understanding. [x] Demonstrates understanding. [] Needs Review. [] Demonstrates / verbalizes understanding of HEP / Iron Lunger previously given.     Frequency:  1-3 days per week for 4-6 weeks    Patient understands diagnosis/prognosis and consents to treatment, plan and goals: [x] Yes    [] No     Thank you for the opportunity to work with your patient. If you have questions or comments, please contact me at [phone]; fax: [phone]. Electronically signed by: Gilbert Steinberg PT    Medicare Patients Only     Please sign Physician's Certification and return to: [address] [place] [address]  Dept: [phone]  Dept Fax: [phone] Certification / Comments     Frequency/Duration 1-3 days per week for 4-6 weeks. Certification period from 10/11/2021  to 01/10/2021. I have reviewed the Plan of Care established for skilled therapy services and certify that the services are required and that they will be provided while the patient is under my care.     Physician's Comments/Revisions:               Physician's Printed Name:                                           [de-identified] Signature:                                                               Date:

## 2021-10-11 NOTE — PROGRESS NOTES
Physical Therapy Daily Treatment Note    Date: 10/11/2021  Patient Name: Dileep Johnson  : 1950   MRN: <[id number]>  DOInjury: 1 MONTH  DOSx: None   Referring Provider: Sarah Berg DO  [address] [facility] Diagnosis:      Diagnosis Orders   1. Tear of right rotator cuff, unspecified tear extent, unspecified whether traumatic     2. Rupture of right proximal biceps tendon, initial encounter         Outcome Measure: Quick Dash: 27% Impairment    S: See eval  O: Pt given written HEP  Time 5071-0773     Visit 1 Repeat outcome measure at mid point and end.     Pain 3/10     ROM See eval     Modalities            Manual                  Stretch      Table slides      Wall Walk Flex      Wall Walk ABD      Wall Pec/ER Stretch      Wall ER Stretch      Towel IR Stretch      Cross Body Adduction      Supine Stretch with Arms Behind Head            Exercise      UBE          Pulleys - Flex      Pulleys - IR      Supine Wand Flex HEP     Supine Wand Bench Press HEP     Supine Wand ER/IR HEP     Standing Wand IR HEP     Standing Wand Scaption HEP     Standing Wand Flex      Standing Wand Shoulder Press      Standing Wand ER/IR      Shrugs AROM       Pendulum Ex            Supine Flex      S-lying ABD      S-lying ER            Prone Flexion      Prone Ext      Prone Row with ER      Prone Horizontal ABD            Standing Flex      Standing ER      Standing IR      Standing Scaption       Standing ABD      Standing Shoulder Press       Functional activities To aid in ROM and strength needed for reaching , lifting ,pushing and pulling at home/work    ROWS: H NEXT      ROWS: M NEXT \"    ROWS: L NEXT \"    ER NEXT \"    IR NEXT \"    Biceps Curls NEXT     Triceps Extension NEXT     Punches      Shoulder Press      Shoulder Flex      Shoulder ABD             Endurance     Shoulder Flexion with Ball on Wall      Shoulder Flexion with Nuts and Bolts            Weighted Machines      Lat

## 2021-10-12 NOTE — PROGRESS NOTES
Sania Temple is a 70 y.o. female evaluated via telephone on 10/11/2021. Consent:  She and/or health care decision maker is aware that that she may receive a bill for this telephone service, depending on her insurance coverage, and has provided verbal consent to proceed: Yes    The patient was in the [place] of [place] during the entirety of the phone conversation. Documentation:  I communicated with the patient and/or health care decision maker about the pathology results from their most recent biopsy. Details of this discussion including any medical advice provided: All today from erbium YAG laser destruction of left face actinic keratosis. Patient voices no complaints at this time she states that the wound is nearly completely healed and she has noticed the scar maturing well. She voices no complaints. I advised patient on scar care    I affirm this is a Patient Initiated Episode with a Patient who has not had a related appointment within my department in the past 7 days or scheduled within the next 24 hours. Patient identification was verified at the start of the visit: Yes    Total Time: minutes: <5 minutes (not billable)    The visit was conducted pursuant to the emergency declaration under the [facility], [address] [place] [address] authority and the [employer] General Act. Patient identification was verified, and a caregiver was present when appropriate. The patient was located in a state where the provider was credentialed to provide care.     Note: not billable if this call serves to triage the patient into an appointment for the relevant concern      MARGARETH Peralta

## 2021-10-14 ENCOUNTER — OFFICE VISIT (OUTPATIENT)
Dept: ORTHOPEDIC SURGERY | Age: 71
End: 2021-10-14
Payer: MEDICARE

## 2021-10-14 ENCOUNTER — TREATMENT (OUTPATIENT)
Dept: PHYSICAL THERAPY | Age: 71
End: 2021-10-14
Payer: MEDICARE

## 2021-10-14 VITALS — WEIGHT: 173 LBS | TEMPERATURE: 98 F | BODY MASS INDEX: 34.88 KG/M2 | HEIGHT: 59 IN

## 2021-10-14 DIAGNOSIS — S46.211A RUPTURE OF RIGHT PROXIMAL BICEPS TENDON, INITIAL ENCOUNTER: ICD-10-CM

## 2021-10-14 DIAGNOSIS — Z71.82 EXERCISE COUNSELING: ICD-10-CM

## 2021-10-14 DIAGNOSIS — M75.101 TEAR OF RIGHT ROTATOR CUFF, UNSPECIFIED TEAR EXTENT, UNSPECIFIED WHETHER TRAUMATIC: Primary | ICD-10-CM

## 2021-10-14 PROCEDURE — G8399 PT W/DXA RESULTS DOCUMENT: HCPCS | Performed by: ORTHOPAEDIC SURGERY

## 2021-10-14 PROCEDURE — 97110 THERAPEUTIC EXERCISES: CPT

## 2021-10-14 PROCEDURE — G8417 CALC BMI ABV UP PARAM F/U: HCPCS | Performed by: ORTHOPAEDIC SURGERY

## 2021-10-14 PROCEDURE — 3017F COLORECTAL CA SCREEN DOC REV: CPT | Performed by: ORTHOPAEDIC SURGERY

## 2021-10-14 PROCEDURE — 1036F TOBACCO NON-USER: CPT | Performed by: ORTHOPAEDIC SURGERY

## 2021-10-14 PROCEDURE — 1123F ACP DISCUSS/DSCN MKR DOCD: CPT | Performed by: ORTHOPAEDIC SURGERY

## 2021-10-14 PROCEDURE — 97530 THERAPEUTIC ACTIVITIES: CPT

## 2021-10-14 PROCEDURE — G8484 FLU IMMUNIZE NO ADMIN: HCPCS | Performed by: ORTHOPAEDIC SURGERY

## 2021-10-14 PROCEDURE — 4040F PNEUMOC VAC/ADMIN/RCVD: CPT | Performed by: ORTHOPAEDIC SURGERY

## 2021-10-14 PROCEDURE — 1090F PRES/ABSN URINE INCON ASSESS: CPT | Performed by: ORTHOPAEDIC SURGERY

## 2021-10-14 PROCEDURE — G8427 DOCREV CUR MEDS BY ELIG CLIN: HCPCS | Performed by: ORTHOPAEDIC SURGERY

## 2021-10-14 PROCEDURE — 99214 OFFICE O/P EST MOD 30 MIN: CPT | Performed by: ORTHOPAEDIC SURGERY

## 2021-10-14 NOTE — PROGRESS NOTES
Chief Complaint   Patient presents with    Shoulder Pain         HPI:    Patient is 70 y.o. female complaining of right shoulder pain and weakness for 6 weeks. She denies a specific traumatic injury to the right shoulder but almost tripped catching herself. Previous treatments include rest, ice, and anti-inflammatory medication and HEP without much relief. She denies any other orthopedic complaints. Right shoulder pain follow up. Has been doing PT. Has slightly improved pain in shoulder. ROS:    Skin: (-) rash,(-) psoriasis,(-) eczema, (-)skin cancer. Neurologic: (-)numbness, (-)tingling, (-)headaches, (-) LOC. Cardiovascular: (-) Chest pain, (-) swelling in legs/feet, (-) SOB, (-) cramping in legs/feet with walking.     All other review of systems negative except stated above or in HPI      Past Medical History:   Diagnosis Date    Acute MI ([facility] [address] 07/11/08    CAD (coronary artery disease)     Dr. Naif Rodriguez Carotid artery occlusion     Diabetes mellitus ([facility] [address]     Diverticulitis     Hyperlipidemia     Hypertension     Obese     Osteoarthritis     Thyroid nodule      Past Surgical History:   Procedure Laterality Date    CARPAL TUNNEL RELEASE      (L)    CARPAL TUNNEL RELEASE Right 12/13/2019    RIGHT CARPAL TUNNEL RELEASE ENDOSCOPIC performed by Sonido Martinez MD at [address] CATH LAB PROCEDURE      stent placed    [place] Right 12/13/2019    RIGHT THUMB TRIGGER RELEASE performed by Sonido Martinez MD at [facility]  05/2017    LT knee torn meniscus / DR Espinal  2017    THYROIDECTOMY N/A 01/16/2020    TOTAL THYROIDECTOMY--NERVE INTEGRITY MONITOR performed by Jerald Wilson DO at [address]  07/02/2021    severe gastritis--jose angel    UPPER GASTROINTESTINAL ENDOSCOPY N/A 7/2/2021    EGD BIOPSY performed by Peri Garcia MD at [address] [facility]       Current Outpatient Medications:     Alcohol Swabs (B-D SINGLE USE SWABS REGULAR) PADS, Apply 1 each topically 3 times daily USE AS DIRECTED AS NEEDED  TO TEST BLOOD SUGAR, Disp: 300 each, Rfl: 5    ibuprofen (ADVIL;MOTRIN) 800 MG tablet, Take 1 tablet by mouth 2 times daily as needed for Pain, Disp: 60 tablet, Rfl: 0    blood glucose test strips (ACCU-CHEK STEVEN PLUS) strip, TEST TWO TIMES DAILY, Disp: 200 strip, Rfl: 5    atenolol (TENORMIN) 50 MG tablet, Take 1 tablet by mouth daily, Disp: 90 tablet, Rfl: 1    levothyroxine (SYNTHROID) 100 MCG tablet, TAKE 1 TABLET EVERY DAY, Disp: 90 tablet, Rfl: 1    pantoprazole (PROTONIX) 40 MG tablet, Take 1 tablet by mouth 2 times daily (Patient taking differently: Take 40 mg by mouth 2 times daily Patient taking once daily), Disp: 180 tablet, Rfl: 1    venlafaxine (EFFEXOR XR) 150 MG extended release capsule, TAKE 1 CAPSULE EVERY DAY, Disp: 90 capsule, Rfl: 3    Accu-Chek Softclix Lancets MISC, TEST TWO TIMES DAILY, Disp: 200 each, Rfl: 2    quinapril (ACCUPRIL) 5 MG tablet, TAKE 1 TABLET EVERY NIGHT, Disp: 90 tablet, Rfl: 2    simvastatin (ZOCOR) 80 MG tablet, TAKE 1 TABLET EVERY NIGHT, Disp: 90 tablet, Rfl: 2    Insulin Pen Needle (DROPLET PEN NEEDLES) 31G X 6 MM MISC, Inject 1 each into the skin 3 times daily, Disp: 100 each, Rfl: 5    acetaminophen (TYLENOL) 500 MG tablet, Take 1 tablet by mouth 4 times daily as needed for Pain, Disp: 360 tablet, Rfl: 1    aspirin 81 MG EC tablet, Take 1 tablet by mouth daily LD 12-4-19 (Patient taking differently: Take 81 mg by mouth daily ), Disp: 90 tablet, Rfl: 1    Blood Glucose Monitoring Suppl (ACCU-CHEK STEVEN PLUS) w/Device KIT, CHECK BLOOD SUGAR TWICE DAILY (Patient not taking: Reported on 9/30/2021), Disp: 1 kit, Rfl: 0  No Known Allergies  Social History     Socioeconomic History    Marital status:      Spouse name: Not on file    Number of children: Not on file    Years of education: Not on file    Highest education level: Not on file Occupational History    Occupation: retired    Tobacco Use    Smoking status: Former Smoker     Packs/day: 1.00     Years: 43.00     Pack years: 43.00     Start date: 1968     Quit date: 3/18/2011     Years since quitting: 10.5    Smokeless tobacco: Never Used   Vaping Use    Vaping Use: Never used   Substance and Sexual Activity    Alcohol use: No    Drug use: No    Sexual activity: Yes   Other Topics Concern    Not on file   Social History Narrative    Not on file     Social Determinants of Health     Financial Resource Strain: High Risk    Difficulty of Paying Living Expenses: Very hard   Food Insecurity: No Food Insecurity    Worried About Running Out of Food in the Last Year: Never true    [address] in the Last Year: Never true   Transportation Needs:     Lack of Transportation (Medical):  Lack of Transportation (Non-Medical):    Physical Activity:     Days of Exercise per Week:     Minutes of Exercise per Session:    Stress:     Feeling of Stress :    Social Connections:     Frequency of Communication with Friends and Family:     Frequency of Social Gatherings with Friends and Family:     Attends Bahai Services:     Active Member of Clubs or Organizations:     Attends Club or Organization Meetings:     Marital Status:    Intimate Partner Violence:     Fear of Current or Ex-Partner:     Emotionally Abused:     Physically Abused:     Sexually Abused:      Family History   Problem Relation Age of Onset    Heart Disease Mother     Heart Disease Father     Diabetes Father     Heart Disease Sister     Heart Disease Maternal Grandmother     Heart Disease Paternal Grandmother     Diabetes Paternal Grandmother            Physical Exam:    Temp 98 °F (36.7 °C)   Ht 4' 11\" (1.499 m)   Wt 173 lb (78.5 kg)   BMI 34.94 kg/m²     GENERAL: alert, appears stated age, cooperative, no acute distress    HEENT: Head is normocephalic, atraumatic. PERRLA.      SKIN: Clean, dry, intact. There is not any cellulitis or cutaneous lesions noted in the upper extremities    PULMONARY: breathing is regular and unlabored, no acute distress    CV: The bilateral upper and lower extremities are warm and well-perfused with brisk capillary refill. 2+ pulses UE and LE bilateral.     PSYCHIATRY: Pleasant mood, appropriate behavior, follows commands    NEURO: Sensation is intact distally with light touch with no alteration. Motor exam of the upper extremities show elbow flexion and extension, wrist flexion and extension, and finger abduction grossly intact 5/5. Upper extremity reflexes are bilaterally symmetrical and within normal limits. LYMPH: No lymphedema present distally in upper or lower extremity. MUSCULOSKELETAL:  Shoulder Exam:  Examination of the right shoulder shows: There is not a deformity. There is not erythema. There is not soft tissue swelling. Deltoid region is  tender to palpation. AC Joint is  tender to palpation. Clavicle is not tender to palpation. Bicipital Groove is  tender to palpation. Pectoralis  is not tender to palpation. Scapula/ trapezius is  tender to palpation.   Left:  ROM Full, Strength: Supraspinatus 5/5, Infraspinatus 5/5, Subscapularis 5/5  Right:  /120/60, Strength: Supraspinatus 4/5, Infraspinatus 5/5, Subscapularis 5/5  Left Shoulder:  Crepitus:  no   Tenderness:  none   Effusion:   none   Impingement: negative   Empty Can:  negative   Speed's:  negative      Apprehension:  negative   Cross Arm Sign:  negative   Socorro's:  negative       Neer's:  negative      Belly Press Test:  negative      Drop Arm Test:  negative     Right Shoulder:  Crepitus:  no   Tenderness:  mild   Effusion:   non   Impingement: positive   Empty Can:  positive   Speed's: positive   Apprehension:  not tested   Cross Arm Sign:  positive   Socorro's:  positive      Neer's:  positive      Belly Press Test:  negative   Drop Arm Test:  positive     Mild improvement since last visit    Imaging:  XR SHOULDER RIGHT (MIN 2 VIEWS)    Result Date: 8/20/2021  EXAMINATION: THREE XRAY VIEWS OF THE RIGHT SHOULDER 8/20/2021 2:25 pm COMPARISON: None. HISTORY: ORDERING SYSTEM PROVIDED HISTORY: pain TECHNOLOGIST PROVIDED HISTORY: Reason for exam:->pain FINDINGS: There is no acute fracture or dislocation in the right shoulder. Mild degenerative changes are noted in the gleno humeral joint and AC joint. The no acute fractures. Mild degenerative changes. XR HUMERUS RIGHT (MIN 2 VIEWS)    Result Date: 8/20/2021  EXAMINATION: TWO XRAY VIEWS OF THE RIGHT HUMERUS 8/20/2021 2:25 pm COMPARISON: None. HISTORY: ORDERING SYSTEM PROVIDED HISTORY: pain TECHNOLOGIST PROVIDED HISTORY: Reason for exam:->pain FINDINGS: No fracture, dislocation, or bony abnormality. As visualized, the soft tissues are negative     Negative right humerus. No fracture or acute disease. MRI SHOULDER RIGHT WO CONTRAST    Result Date: 9/15/2021  EXAMINATION: MRI OF THE RIGHT SHOULDER WITHOUT CONTRAST,   9/15/2021 3:08 pm TECHNIQUE: Multiplanar multisequence MRI of the right shoulder was performed without the administration of intravenous contrast. COMPARISON: Radiographs 08/20/2021 HISTORY: ORDERING SYSTEM PROVIDED HISTORY:  Tear of right rotator cuff, unspecified tear extent, unspecified whether traumatic. TECHNOLOGIST PROVIDED HISTORY: What reading provider will be dictating this exam?  CRC FINDINGS: ROTATOR CUFF: No rotator cuff tear identified. Mild supraspinatus and infraspinatus tendinosis. Mild teres minor muscle atrophy. The remaining rotator cuff muscle bulk appears to be within normal limits. BICEPS TENDON: The long head biceps tendon is not definitively seen within the bicipital groove or intra-articularly. LABRUM: No large paralabral cyst is seen. There is degeneration of the labrum. GLENOHUMERAL JOINT: No significant degenerative changes are seen. Trace joint fluid.  [facility] JOINT AND ACROMIOCLAVICULAR ARCH: Mild AC joint degenerative changes with trace subacromial/subdeltoid bursal fluid. BONE MARROW: No acute fracture or significant bone marrow edema. OUTLET SPACES: Unremarkable. The long head biceps tendon is not definitively seen. This raises suspicion for a full-thickness retracted tear. Mild supraspinatus and infraspinatus tendinosis without a rotator cuff tear identified. Mild AC joint degenerative changes. Hilda Hung was seen today for shoulder pain. Diagnoses and all orders for this visit:    Tear of right rotator cuff, unspecified tear extent, unspecified whether traumatic    Rupture of right proximal biceps tendon, initial encounter    Exercise counseling        Patient seen and examined. X-rays reviewed. Patient has exam and history consistent with rotator cuff pathology. MRI recommended for further evaluation and management of possible rotator cuff tear. MRI reviewed with patient in detail. Natural history and course discussed with patient in long discussion  Treatment options discussed with patient in detail including risks and benefits. Patient should do well with conservative management as patient would like to avoid surgery at this time. Senia pain cream    In a 15 minute assessment and discussion, patient was counseled on continued weight loss, diet, and physical activity relating to this condition. She was educated with options in detail including nutrition, joining a health club/ weight loss program, and use of cardio equipment such as the Arc Trainer and the importance of use as well as range of motion and HEP exercises for weight loss. Calista Ramirez, DO            25 minutes was spent with patient. 50% or greater was spent counseling the patient.

## 2021-10-14 NOTE — PROGRESS NOTES
Physical Therapy Daily Treatment Note    Date: 10/14/2021  Patient Name: Danita Courtney  : 1950   MRN: <[id number]>  DOInjury: 1 MONTH  DOSx: None     Referring Provider:     Evelio Dalal DO  [id number] [facility]:    Diagnosis Orders   1. Tear of right rotator cuff, unspecified tear extent, unspecified whether traumatic     2. Rupture of right proximal biceps tendon, initial encounter         Outcome Measure: Quick Dash: 27% Impairment    S: Patient had minimal pain after performing HEP but feeling good now. O: Pt given written HEP  Time 3447-9628     Visit    Repeat outcome measure at mid point and end.     Pain 1/10     ROM See eval     Modalities            Manual                  Stretch      Table slides      Wall Walk Flex      Wall Walk ABD      Wall Pec/ER Stretch      Wall ER Stretch      Towel IR Stretch      Cross Body Adduction      Supine Stretch with Arms Behind Head            Exercise      UBE     3 min   TE   Pulleys - Flex      Pulleys - IR      Supine Wand Flex 2 x 15  TE   Supine Wand Bench Press 2 x 15  TE   Supine Wand ER/IR 2 x 15  TE   Standing Wand IR 2 x 15  TE   Standing Wand Scaption 2 x 15  TE   Standing Wand Flex      Standing Wand Shoulder Press      Standing Wand ER/IR      Shrugs AROM       Pendulum Ex            Supine Flex      S-lying ABD      S-lying ER            Prone Flexion      Prone Ext      Prone Row with ER      Prone Horizontal ABD            Standing Flex      Standing ER      Standing IR      Standing Scaption       Standing ABD      Standing Shoulder Press       Functional activities To aid in ROM and strength needed for reaching , lifting ,pushing and pulling at home/work    ROWS: H Green 2 x 10 \" TA   ROWS: M Green 2 x 10 \" TA   ROWS: L Green 2 x 10 \" TA   ER Red 2 x 10 \" TA   IR Red 2 x 10 \" TA   Biceps Curls NEXT     Triceps Extension NEXT     Punches      Shoulder Press      Shoulder Flex      Shoulder ABD             Endurance     Shoulder Flexion with Ball on Wall      Shoulder Flexion with Nuts and Bolts            Weighted Machines      Lat Pull Down      Vertical Row      A: Patient had 3/10 pain after session but tolerated well. P: Continue with rehab plan.    Edwin Dubois PTA    Treatment Charges: Mins Units   Initial Evaluation     Re-Evaluation     Ther Exercise         TE 15 1   Manual Therapy     MT     Ther Activities        TA 25 2   Gait Training          GT     Neuro Re-education NR     Modalities     Non-Billable Service Time     Other     Total Time/Units 40 3

## 2021-10-18 DIAGNOSIS — I10 ESSENTIAL HYPERTENSION: ICD-10-CM

## 2021-10-19 ENCOUNTER — TREATMENT (OUTPATIENT)
Dept: PHYSICAL THERAPY | Age: 71
End: 2021-10-19
Payer: MEDICARE

## 2021-10-19 DIAGNOSIS — M75.101 TEAR OF RIGHT ROTATOR CUFF, UNSPECIFIED TEAR EXTENT, UNSPECIFIED WHETHER TRAUMATIC: Primary | ICD-10-CM

## 2021-10-19 DIAGNOSIS — S46.211A RUPTURE OF RIGHT PROXIMAL BICEPS TENDON, INITIAL ENCOUNTER: ICD-10-CM

## 2021-10-19 PROCEDURE — 97110 THERAPEUTIC EXERCISES: CPT | Performed by: PHYSICAL THERAPIST

## 2021-10-19 RX ORDER — QUINAPRIL 5 1/1
TABLET ORAL
Qty: 90 TABLET | Refills: 2 | Status: SHIPPED
Start: 2021-10-19 | End: 2021-12-08 | Stop reason: SDUPTHER

## 2021-10-19 NOTE — PROGRESS NOTES
Physical Therapy Daily Treatment Note    Date: 10/19/2021  Patient Name: Khang Johnston  : 1950   MRN: <[id number]>  DOInjury: 1 MONTH  DOSx: None     Referring Provider:     Zeferino Borges DO  [phone] [facility]:    Diagnosis Orders   1. Tear of right rotator cuff, unspecified tear extent, unspecified whether traumatic     2. Rupture of right proximal biceps tendon, initial encounter         Outcome Measure: Quick Dash: 27% Impairment    S: Patient stated that she feels much better than her initial assessment and that her pain level has gone down. O: Pt given written HEP  Time 9520-8138     Visit 3/18   Repeat outcome measure at mid point and end. Pain 210     ROM See eval     Modalities            Manual                  Stretch      Table slides      Wall Walk Flex      Wall Walk ABD      Wall Pec/ER Stretch      Wall ER Stretch      Towel IR Stretch      Cross Body Adduction      Supine Stretch with Arms Behind Head            Exercise      UBE        TE   Pulleys - Flex 3 min NEXT with holds and ABD    Pulleys - IR 3 min     Supine Wand Flex  TE   Supine Wand Bench Press  TE   Supine Wand ER/IR  TE   Standing Wand IR  TE   Standing Wand Scaption  TE   Standing Wand Flex      Standing Wand Shoulder Press      Standing Wand ER/IR      Shrugs AROM       Pendulum Ex            Supine Flex      S-lying ABD      S-lying ER            Prone Flexion      Prone Ext      Prone Row with ER      Prone Horizontal ABD            Standing Flex 3# 15 reps x 1 set Increase? ?    Standing ER 3# 15 reps x 1 set Increase? ?    Standing IR 3# 15 reps x 1 set Increase? ?    Standing Scaption  3# 15 reps x 1 set Increase? ?    Standing ABD 3# 15 reps x 1 set Increase? ?    Standing Shoulder Press 3# 15 reps x 1 set Increase? ?    Standing Biceps Curl 3# 15 reps x 1 set Increase??      Functional activities To aid in ROM and strength needed for reaching , lifting ,pushing and pulling at home/work    ROWS: H NEXT try BLACK TA   ROWS: M NEXT try BLACK TA   ROWS: L NEXT try BLACK TA   ER Blue 15 reps x 1 set NEXT try BLACK TA   IR Blue 15 reps x 1 set NEXT try BLACK TA   Biceps Curls Blue 15 reps x 1 set NEXT try BLACK    Triceps Extension Blue 15 reps x 1 set NEXT try BLACK    Punches      Shoulder Press      Shoulder Flex      Shoulder ABD             Endurance     Shoulder Flexion with Ball on Wall      Shoulder Flexion with Nuts and Bolts            Weighted Machines      Lat Pull Down      Vertical Row      A: Patient improving ROM and strength throughout therapy session. P: Continue with rehab plan.    Thomasina Leyden, PT    Treatment Charges: Mins Units   Initial Evaluation     Re-Evaluation     Ther Exercise         TE 40 3   Manual Therapy     MT     Ther Activities        TA     Gait Training          GT     Neuro Re-education NR     Modalities     Non-Billable Service Time     Other     Total Time/Units 40 3

## 2021-10-19 NOTE — TELEPHONE ENCOUNTER
Last Appointment:  7/7/2021  Future Appointments   Date Time Provider Radha Vargas   10/19/2021  3:40 PM Mariposa Schuster, PT [facility] PT [facility]   10/22/2021  4:20 PM Mariposa Schuster PT [facility] PT [facility]   11/18/2021  1:10 PM Searcy Chalk, DO Neomi Bosworth [facility]   1/6/2022  9:00 AM SCHEDULE, CONCETTA VILLANUEVA [place] [facility]   1/13/2022 10:00 AM Josué Stinson DO [facility]   4/25/2023 10:30 AM [facility] VAS US RM 1 SEYZ CARDIO . José Manuel [facility]   4/25/2023 11:00 AM Joaquín Copeland MD [facility]

## 2021-10-21 DIAGNOSIS — E11.9 TYPE 2 DIABETES MELLITUS WITHOUT COMPLICATION, WITHOUT LONG-TERM CURRENT USE OF INSULIN (HCC): ICD-10-CM

## 2021-10-21 RX ORDER — SIMVASTATIN 80 MG
TABLET ORAL
Qty: 90 TABLET | Refills: 2 | Status: SHIPPED
Start: 2021-10-21 | End: 2022-05-24 | Stop reason: SDUPTHER

## 2021-10-21 NOTE — TELEPHONE ENCOUNTER
Last Appointment:  7/7/2021  Future Appointments   Date Time Provider Radha Vargas   10/22/2021  4:20 PM Mariposa Schuster, PT [facility] PT [facility]   10/26/2021  3:40 PM Mariposa Schuster, PT [facility] PT [facility]   10/29/2021  3:40 PM Mariposa Schuster, PT [facility] PT [facility]   11/18/2021  1:10 PM Searcy Chalk, DO Neomi Bosworth [facility]   1/6/2022  9:00 AM SCHEDULE, CONCETTA VILLANUEVA [place] [facility]   1/13/2022 10:00 AM DO RICH Forbes [facility]   4/25/2023 10:30 AM [facility] VAS  RM 1 SEYZ CARDIO [facility]   4/25/2023 11:00 AM Joaquín Copeland MD VAS/MED [facility]

## 2021-10-22 ENCOUNTER — TREATMENT (OUTPATIENT)
Dept: PHYSICAL THERAPY | Age: 71
End: 2021-10-22
Payer: MEDICARE

## 2021-10-22 DIAGNOSIS — S46.211A RUPTURE OF RIGHT PROXIMAL BICEPS TENDON, INITIAL ENCOUNTER: ICD-10-CM

## 2021-10-22 DIAGNOSIS — M75.101 TEAR OF RIGHT ROTATOR CUFF, UNSPECIFIED TEAR EXTENT, UNSPECIFIED WHETHER TRAUMATIC: Primary | ICD-10-CM

## 2021-10-22 PROCEDURE — 97110 THERAPEUTIC EXERCISES: CPT | Performed by: PHYSICAL THERAPIST

## 2021-10-22 NOTE — PROGRESS NOTES
set with Amedeo Prior Band TA   ROWS: L  TA   ER 15 reps x 2 set with Amedeo Prior Band 1 band TA   IR 15 reps x 2 set with Amedeo Prior Band 1 band TA   Biceps Curls 15 reps x 2 set with Amedeo Prior Band     Triceps Extension 15 reps x 2 set with Amedeo Prior Band     Punches      Shoulder Press      Shoulder Flex      Shoulder ABD             Endurance     Shoulder Flexion with Ball on Wall      Shoulder Flexion with Nuts and Bolts            Weighted Machines      Lat Pull Down      Vertical Row      A: Patient increased band tension for exercises and tolerated well with mild discomfort. P: Continue with rehab plan.    Yissel Andrew PT    Treatment Charges: Mins Units   Initial Evaluation     Re-Evaluation     Ther Exercise         TE 40 3   Manual Therapy     MT     Ther Activities        TA     Gait Training          GT     Neuro Re-education NR     Modalities     Non-Billable Service Time     Other     Total Time/Units 40 3

## 2021-10-25 DIAGNOSIS — E04.1 THYROID NODULE: ICD-10-CM

## 2021-10-25 DIAGNOSIS — K21.9 GASTROESOPHAGEAL REFLUX DISEASE WITHOUT ESOPHAGITIS: ICD-10-CM

## 2021-10-26 ENCOUNTER — TELEPHONE (OUTPATIENT)
Dept: PHYSICAL THERAPY | Age: 71
End: 2021-10-26

## 2021-10-26 RX ORDER — LEVOTHYROXINE SODIUM 0.1 MG/1
TABLET ORAL
Qty: 90 TABLET | Refills: 1 | Status: SHIPPED
Start: 2021-10-26 | End: 2022-03-24

## 2021-10-26 RX ORDER — PANTOPRAZOLE SODIUM 40 MG/1
40 TABLET, DELAYED RELEASE ORAL DAILY
Qty: 90 TABLET | Refills: 1 | Status: SHIPPED
Start: 2021-10-26 | End: 2022-03-24

## 2021-10-26 NOTE — TELEPHONE ENCOUNTER
Last Appointment:  7/7/2021  Future Appointments   Date Time Provider Radha Johnsoni   10/26/2021  3:40 PM Kala Hill, PT [facility] PT [facility]   10/29/2021  1:00 PM SCHEDULE, CONCETTA [facility]   10/29/2021  3:40 PM Kala Hill PT [facility]   11/18/2021  1:10 PM James Chatman DO [facility]   1/6/2022  9:00 AM SCHEDULE, MYRON [facility]   1/13/2022 10:00 AM Moris Tyler [facility]   4/25/2023 10:30 AM [facility] VAS US RM 1 SEYZ CARDIO [facility]   4/25/2023 11:00 AM Annie Villegas MD [facility]/MED [facility]

## 2021-10-29 ENCOUNTER — NURSE ONLY (OUTPATIENT)
Dept: FAMILY MEDICINE CLINIC | Age: 71
End: 2021-10-29
Payer: MEDICARE

## 2021-10-29 DIAGNOSIS — Z23 FLU VACCINE NEED: Primary | ICD-10-CM

## 2021-10-29 PROCEDURE — 99999 PR OFFICE/OUTPT VISIT,PROCEDURE ONLY: CPT | Performed by: INTERNAL MEDICINE

## 2021-10-29 PROCEDURE — G0008 ADMIN INFLUENZA VIRUS VAC: HCPCS | Performed by: INTERNAL MEDICINE

## 2021-10-29 PROCEDURE — 90694 VACC AIIV4 NO PRSRV 0.5ML IM: CPT | Performed by: INTERNAL MEDICINE

## 2021-10-29 NOTE — PROGRESS NOTES
Vaccine Information Sheet, \"Influenza - Inactivated\"  given to Pawel Zapata, or parent/legal guardian of  Pawel Zapata and verbalized understanding. Patient responses:    Have you ever had a reaction to a flu vaccine? No  Do you have any current illness? No  Have you ever had [place] Syndrome? No  Do you have a serious allergy to any of the follow: Neomycin, Polymyxin, Thimerosal, eggs or egg products? No    Flu vaccine given per order. Please see immunization tab. Risks and benefits explained. Current VIS given.

## 2021-11-03 DIAGNOSIS — E11.9 TYPE 2 DIABETES MELLITUS WITHOUT COMPLICATION, WITHOUT LONG-TERM CURRENT USE OF INSULIN (HCC): ICD-10-CM

## 2021-11-03 RX ORDER — PEN NEEDLE, DIABETIC 31 G X1/4"
1 NEEDLE, DISPOSABLE MISCELLANEOUS 3 TIMES DAILY
Qty: 100 EACH | Refills: 5 | Status: SHIPPED
Start: 2021-11-03 | End: 2022-04-07

## 2021-11-10 ENCOUNTER — OFFICE VISIT (OUTPATIENT)
Dept: FAMILY MEDICINE CLINIC | Age: 71
End: 2021-11-10
Payer: MEDICARE

## 2021-11-10 VITALS
WEIGHT: 173 LBS | BODY MASS INDEX: 34.88 KG/M2 | SYSTOLIC BLOOD PRESSURE: 110 MMHG | HEIGHT: 59 IN | DIASTOLIC BLOOD PRESSURE: 78 MMHG | OXYGEN SATURATION: 96 % | HEART RATE: 87 BPM | TEMPERATURE: 97.5 F

## 2021-11-10 DIAGNOSIS — J40 BRONCHITIS: Primary | ICD-10-CM

## 2021-11-10 PROCEDURE — G8484 FLU IMMUNIZE NO ADMIN: HCPCS | Performed by: FAMILY MEDICINE

## 2021-11-10 PROCEDURE — 1036F TOBACCO NON-USER: CPT | Performed by: FAMILY MEDICINE

## 2021-11-10 PROCEDURE — 1123F ACP DISCUSS/DSCN MKR DOCD: CPT | Performed by: FAMILY MEDICINE

## 2021-11-10 PROCEDURE — 1090F PRES/ABSN URINE INCON ASSESS: CPT | Performed by: FAMILY MEDICINE

## 2021-11-10 PROCEDURE — G8399 PT W/DXA RESULTS DOCUMENT: HCPCS | Performed by: FAMILY MEDICINE

## 2021-11-10 PROCEDURE — G8427 DOCREV CUR MEDS BY ELIG CLIN: HCPCS | Performed by: FAMILY MEDICINE

## 2021-11-10 PROCEDURE — 3017F COLORECTAL CA SCREEN DOC REV: CPT | Performed by: FAMILY MEDICINE

## 2021-11-10 PROCEDURE — G8417 CALC BMI ABV UP PARAM F/U: HCPCS | Performed by: FAMILY MEDICINE

## 2021-11-10 PROCEDURE — 4040F PNEUMOC VAC/ADMIN/RCVD: CPT | Performed by: FAMILY MEDICINE

## 2021-11-10 PROCEDURE — 99214 OFFICE O/P EST MOD 30 MIN: CPT | Performed by: FAMILY MEDICINE

## 2021-11-10 RX ORDER — DOXYCYCLINE HYCLATE 100 MG
100 TABLET ORAL 2 TIMES DAILY
Qty: 20 TABLET | Refills: 0 | Status: SHIPPED | OUTPATIENT
Start: 2021-11-10 | End: 2021-11-20

## 2021-11-10 RX ORDER — METHYLPREDNISOLONE 4 MG/1
TABLET ORAL
Qty: 1 KIT | Refills: 0 | Status: SHIPPED | OUTPATIENT
Start: 2021-11-10 | End: 2021-11-16

## 2021-11-10 ASSESSMENT — ENCOUNTER SYMPTOMS
COUGH: 1
BLOOD IN STOOL: 0
SHORTNESS OF BREATH: 0
WHEEZING: 1
CHEST TIGHTNESS: 0
ABDOMINAL PAIN: 0

## 2021-11-10 NOTE — PROGRESS NOTES
11/10/21  Lorelei Tao : 1950 Sex: female  Age: 70 y.o. Chief Complaint   Patient presents with    Cough     non productive since last Thursday / Upper back pain / Covid vac and flu vaccinated     Head Congestion       HPI this pleasant 19-year-old woman is here complaining of a cough that is been worsening for several days and now accompanied with some wheezing. She is a former smoker that has been 8 years without smoking her breathing has improved in general but she still gets bronchitis every so often. Review of Systems   Constitutional: Negative for diaphoresis and unexpected weight change. HENT: Positive for congestion. Eyes: Negative for visual disturbance. Respiratory: Positive for cough and wheezing. Negative for chest tightness and shortness of breath. Cardiovascular: Negative for chest pain and palpitations. Gastrointestinal: Negative for abdominal pain and blood in stool. Genitourinary: Negative for flank pain. Musculoskeletal: Negative for neck pain and neck stiffness. Neurological: Negative for seizures, syncope and speech difficulty. Physical Exam  Constitutional:       Appearance: Normal appearance. HENT:      Head: Normocephalic. Right Ear: Tympanic membrane normal.      Left Ear: Tympanic membrane normal.      Nose: Congestion present. Mouth/Throat:      Mouth: Mucous membranes are moist.      Pharynx: Oropharynx is clear. Cardiovascular:      Rate and Rhythm: Normal rate and regular rhythm. Pulses: Normal pulses. Heart sounds: Normal heart sounds. Pulmonary:      Breath sounds: Wheezing present. Abdominal:      Palpations: Abdomen is soft. Skin:     General: Skin is warm and dry. Capillary Refill: Capillary refill takes less than 2 seconds. Neurological:      Mental Status: She is alert and oriented to person, place, and time.    Psychiatric:         Mood and Affect: Mood normal.         Behavior: Behavior

## 2021-11-15 NOTE — PROGRESS NOTES
Chief Complaint   Patient presents with    Shoulder Pain         HPI:    Patient is 70 y.o. female complaining of right shoulder pain and weakness for 6 weeks. She denies a specific traumatic injury to the right shoulder but almost tripped catching herself. Previous treatments include rest, ice, and anti-inflammatory medication and HEP without much relief. She denies any other orthopedic complaints. Right shoulder pain follow up. Has been doing PT. Has slightly improved pain in shoulder. Right shoulder pain continues. Patient fell on 11/14/21 and thinks she may have broke some ribs and caused pain. Shoulder was feeling better before this and felt like she was getting more ROM. Did not get treatment after fall. ROS:    Skin: (-) rash,(-) psoriasis,(-) eczema, (-)skin cancer. Neurologic: (-)numbness, (-)tingling, (-)headaches, (-) LOC. Cardiovascular: (-) Chest pain, (-) swelling in legs/feet, (-) SOB, (-) cramping in legs/feet with walking.     All other review of systems negative except stated above or in HPI      Past Medical History:   Diagnosis Date    Acute MI ([facility] [address] 07/11/08    CAD (coronary artery disease)     Dr. Rose Safe Carotid artery occlusion     Diabetes mellitus ([facility] [address]     Diverticulitis     Hyperlipidemia     Hypertension     Obese     Osteoarthritis     Thyroid nodule      Past Surgical History:   Procedure Laterality Date    CARPAL TUNNEL RELEASE      (L)    CARPAL TUNNEL RELEASE Right 12/13/2019    RIGHT CARPAL TUNNEL RELEASE ENDOSCOPIC performed by Jojo Stout MD at [facility] CATH LAB PROCEDURE      stent placed    [place] Right 12/13/2019    RIGHT THUMB TRIGGER RELEASE performed by Jojo Stout MD at [facility]  05/2017    LT knee torn meniscus / DR Espinal  2017    THYROIDECTOMY N/A 01/16/2020    TOTAL THYROIDECTOMY--NERVE INTEGRITY MONITOR performed by Augusto Naylor DO at [address] [place] [address] GASTROINTESTINAL ENDOSCOPY  07/02/2021    severe gastritis--jose angel    UPPER GASTROINTESTINAL ENDOSCOPY N/A 7/2/2021    EGD BIOPSY performed by Jessica Burciaga MD at [facility] ENDOSCOPY       Current Outpatient Medications:     atenolol (TENORMIN) 50 MG tablet, Take 1 tablet by mouth daily, Disp: 90 tablet, Rfl: 1    quinapril (ACCUPRIL) 5 MG tablet, TAKE 1 TABLET EVERY NIGHT, Disp: 90 tablet, Rfl: 2    Insulin Pen Needle (DROPLET PEN NEEDLES) 31G X 6 MM MISC, Inject 1 each into the skin 3 times daily, Disp: 100 each, Rfl: 5    pantoprazole (PROTONIX) 40 MG tablet, Take 1 tablet by mouth daily, Disp: 90 tablet, Rfl: 1    levothyroxine (SYNTHROID) 100 MCG tablet, TAKE 1 TABLET EVERY DAY, Disp: 90 tablet, Rfl: 1    simvastatin (ZOCOR) 80 MG tablet, TAKE 1 TABLET EVERY NIGHT, Disp: 90 tablet, Rfl: 2    Alcohol Swabs (B-D SINGLE USE SWABS REGULAR) PADS, Apply 1 each topically 3 times daily USE AS DIRECTED AS NEEDED  TO TEST BLOOD SUGAR, Disp: 300 each, Rfl: 5    ibuprofen (ADVIL;MOTRIN) 800 MG tablet, Take 1 tablet by mouth 2 times daily as needed for Pain, Disp: 60 tablet, Rfl: 0    blood glucose test strips (ACCU-CHEK STEVEN PLUS) strip, TEST TWO TIMES DAILY, Disp: 200 strip, Rfl: 5    venlafaxine (EFFEXOR XR) 150 MG extended release capsule, TAKE 1 CAPSULE EVERY DAY, Disp: 90 capsule, Rfl: 3    Accu-Chek Softclix Lancets MISC, TEST TWO TIMES DAILY, Disp: 200 each, Rfl: 2    acetaminophen (TYLENOL) 500 MG tablet, Take 1 tablet by mouth 4 times daily as needed for Pain, Disp: 360 tablet, Rfl: 1    aspirin 81 MG EC tablet, Take 1 tablet by mouth daily LD 12-4-19 (Patient taking differently: Take 81 mg by mouth daily ), Disp: 90 tablet, Rfl: 1    Blood Glucose Monitoring Suppl (ACCU-CHEK STEVEN PLUS) w/Device KIT, CHECK BLOOD SUGAR TWICE DAILY, Disp: 1 kit, Rfl: 0  No Known Allergies  Social History     Socioeconomic History    Marital status:      Spouse name: Not on file    Number of children: Not on file  Years of education: Not on file    Highest education level: Not on file   Occupational History    Occupation: retired    Tobacco Use    Smoking status: Former Smoker     Packs/day: 1.00     Years: 43.00     Pack years: 43.00     Start date: 1968     Quit date: 3/18/2011     Years since quitting: 10.7    Smokeless tobacco: Never Used   Vaping Use    Vaping Use: Never used   Substance and Sexual Activity    Alcohol use: No    Drug use: No    Sexual activity: Yes   Other Topics Concern    Not on file   Social History Narrative    Not on file     Social Determinants of Health     Financial Resource Strain: High Risk    Difficulty of Paying Living Expenses: Very hard   Food Insecurity: No Food Insecurity    Worried About Running Out of Food in the Last Year: Never true    Margarito of Food in the Last Year: Never true   Transportation Needs:     Lack of Transportation (Medical): Not on file    Lack of Transportation (Non-Medical):  Not on file   Physical Activity:     Days of Exercise per Week: Not on file    Minutes of Exercise per Session: Not on file   Stress:     Feeling of Stress : Not on file   Social Connections:     Frequency of Communication with Friends and Family: Not on file    Frequency of Social Gatherings with Friends and Family: Not on file    Attends Jainism Services: Not on file    Active Member of [address] [place] [address] [email or web address] or Organizations: Not on file    Attends Club or Organization Meetings: Not on file    Marital Status: Not on file   Intimate Partner Violence:     Fear of Current or Ex-Partner: Not on file    Emotionally Abused: Not on file    Physically Abused: Not on file    Sexually Abused: Not on file   Housing Stability:     Unable to Pay for Housing in the Last Year: Not on file    Number of [place] in the Last Year: Not on file    Unstable Housing in the Last Year: Not on file     Family History   Problem Relation Age of Onset    Heart Disease Mother     Heart Disease Father     Diabetes Father     Heart Disease Sister     Heart Disease Maternal Grandmother     Heart Disease Paternal Grandmother     Diabetes Paternal Grandmother            Physical Exam:    Temp 80 °F (36.7 °C)   Ht 4' 11\" (1.499 m)   Wt 173 lb (78.5 kg)   BMI 34.94 kg/m²     GENERAL: alert, appears stated age, cooperative, no acute distress    HEENT: Head is normocephalic, atraumatic. PERRLA. SKIN: Clean, dry, intact. There is not any cellulitis or cutaneous lesions noted in the upper extremities    PULMONARY: breathing is regular and unlabored, no acute distress    CV: The bilateral upper and lower extremities are warm and well-perfused with brisk capillary refill. 2+ pulses UE and LE bilateral.     PSYCHIATRY: Pleasant mood, appropriate behavior, follows commands    NEURO: Sensation is intact distally with light touch with no alteration. Motor exam of the upper extremities show elbow flexion and extension, wrist flexion and extension, and finger abduction grossly intact 5/5. Upper extremity reflexes are bilaterally symmetrical and within normal limits. LYMPH: No lymphedema present distally in upper or lower extremity. MUSCULOSKELETAL:  Shoulder Exam:  Examination of the right shoulder shows: There is not a deformity. There is not erythema. There is not soft tissue swelling. Deltoid region is  tender to palpation. AC Joint is  tender to palpation. Clavicle is not tender to palpation. Bicipital Groove is  tender to palpation. Pectoralis  is not tender to palpation. Scapula/ trapezius is  tender to palpation.   Left:  ROM Full, Strength: Supraspinatus 5/5, Infraspinatus 5/5, Subscapularis 5/5  Right:  /120/60, Strength: Supraspinatus 4/5, Infraspinatus 5/5, Subscapularis 5/5  Left Shoulder:  Crepitus:  no   Tenderness:  none   Effusion:   none   Impingement: negative   Empty Can:  negative   Speed's:  negative      Apprehension:  negative   Cross Arm Sign:  negative Ocean's:  negative       Neer's:  negative      Belly Press Test:  negative      Drop Arm Test:  negative     Right Shoulder:  Crepitus:  no   Tenderness:  mild   Effusion:   non   Impingement: positive   Empty Can:  positive   Speed's: positive   Apprehension:  not tested   Cross Arm Sign:  positive   Ocean's:  positive      Neer's:  positive      Belly Press Test:  negative   Drop Arm Test:  positive     no change since last visit. Imaging:  XR SHOULDER RIGHT (MIN 2 VIEWS)    Result Date: 8/20/2021  EXAMINATION: THREE XRAY VIEWS OF THE RIGHT SHOULDER 8/20/2021 2:25 pm COMPARISON: None. HISTORY: ORDERING SYSTEM PROVIDED HISTORY: pain TECHNOLOGIST PROVIDED HISTORY: Reason for exam:->pain FINDINGS: There is no acute fracture or dislocation in the right shoulder. Mild degenerative changes are noted in the gleno humeral joint and AC joint. The no acute fractures. Mild degenerative changes. XR HUMERUS RIGHT (MIN 2 VIEWS)    Result Date: 8/20/2021  EXAMINATION: TWO XRAY VIEWS OF THE RIGHT HUMERUS 8/20/2021 2:25 pm COMPARISON: None. HISTORY: ORDERING SYSTEM PROVIDED HISTORY: pain TECHNOLOGIST PROVIDED HISTORY: Reason for exam:->pain FINDINGS: No fracture, dislocation, or bony abnormality. As visualized, the soft tissues are negative     Negative right humerus. No fracture or acute disease. MRI SHOULDER RIGHT WO CONTRAST    Result Date: 9/15/2021  EXAMINATION: MRI OF THE RIGHT SHOULDER WITHOUT CONTRAST,   9/15/2021 3:08 pm TECHNIQUE: Multiplanar multisequence MRI of the right shoulder was performed without the administration of intravenous contrast. COMPARISON: Radiographs 08/20/2021 HISTORY: ORDERING SYSTEM PROVIDED HISTORY:  Tear of right rotator cuff, unspecified tear extent, unspecified whether traumatic. TECHNOLOGIST PROVIDED HISTORY: What reading provider will be dictating this exam?  CRC FINDINGS: ROTATOR CUFF: No rotator cuff tear identified. Mild supraspinatus and infraspinatus tendinosis. Mild teres minor muscle atrophy. The remaining rotator cuff muscle bulk appears to be within normal limits. BICEPS TENDON: The long head biceps tendon is not definitively seen within the bicipital groove or intra-articularly. LABRUM: No large paralabral cyst is seen. There is degeneration of the labrum. GLENOHUMERAL JOINT: No significant degenerative changes are seen. Trace joint fluid. AC JOINT AND ACROMIOCLAVICULAR ARCH: Mild AC joint degenerative changes with trace subacromial/subdeltoid bursal fluid. BONE MARROW: No acute fracture or significant bone marrow edema. OUTLET SPACES: Unremarkable. The long head biceps tendon is not definitively seen. This raises suspicion for a full-thickness retracted tear. Mild supraspinatus and infraspinatus tendinosis without a rotator cuff tear identified. Mild AC joint degenerative changes. XR RIBS LEFT INCLUDE CHEST (MIN 3 VIEWS)    Result Date: 11/18/2021  EXAMINATION: XRAY VIEWS OF THE LEFT RIBS WITH FRONTAL XRAY VIEW OF THE CHEST 11/18/2021 1:19 pm COMPARISON: None. HISTORY: ORDERING SYSTEM PROVIDED HISTORY: Rib pain on left side FINDINGS: No evidence of acute fracture of the ribs. No focal rib abnormality. No evidence of acute focal process in the lungs. No evidence of consolidation or pulmonary edema. No pleural effusion or pneumothorax. Cardiomediastinal silhouette demonstrates no acute abnormality. No acute abnormality of the ribs. No acute process in the lungs. Estela Anderson was seen today for shoulder pain. Diagnoses and all orders for this visit:    Tear of right rotator cuff, unspecified tear extent, unspecified whether traumatic    Rupture of right proximal biceps tendon, initial encounter    Exercise counseling    Rib pain on left side  -     XR RIBS LEFT INCLUDE CHEST (MIN 3 VIEWS); Future        Patient seen and examined. X-rays reviewed. Patient has exam and history consistent with rotator cuff pathology.   MRI recommended for further evaluation and management of possible rotator cuff tear. MRI reviewed with patient in detail. Natural history and course discussed with patient in long discussion  Treatment options discussed with patient in detail including risks and benefits. Patient should do well with conservative management as patient would like to avoid surgery at this time. Senia pain cream    In a 15 minute assessment and discussion, patient was counseled on continued weight loss, diet, and physical activity relating to this condition. She was educated with options in detail including nutrition, joining a health club/ weight loss program, and use of cardio equipment such as the Arc Trainer and the importance of use as well as range of motion and HEP exercises for weight loss.      Isaias Santo, DO

## 2021-11-18 ENCOUNTER — OFFICE VISIT (OUTPATIENT)
Dept: ORTHOPEDIC SURGERY | Age: 71
End: 2021-11-18
Payer: MEDICARE

## 2021-11-18 VITALS — WEIGHT: 173 LBS | BODY MASS INDEX: 34.88 KG/M2 | TEMPERATURE: 98 F | HEIGHT: 59 IN

## 2021-11-18 DIAGNOSIS — R07.81 RIB PAIN ON LEFT SIDE: ICD-10-CM

## 2021-11-18 DIAGNOSIS — Z71.82 EXERCISE COUNSELING: ICD-10-CM

## 2021-11-18 DIAGNOSIS — M75.101 TEAR OF RIGHT ROTATOR CUFF, UNSPECIFIED TEAR EXTENT, UNSPECIFIED WHETHER TRAUMATIC: Primary | ICD-10-CM

## 2021-11-18 DIAGNOSIS — S46.211A RUPTURE OF RIGHT PROXIMAL BICEPS TENDON, INITIAL ENCOUNTER: ICD-10-CM

## 2021-11-18 PROCEDURE — G8484 FLU IMMUNIZE NO ADMIN: HCPCS | Performed by: ORTHOPAEDIC SURGERY

## 2021-11-18 PROCEDURE — 1036F TOBACCO NON-USER: CPT | Performed by: ORTHOPAEDIC SURGERY

## 2021-11-18 PROCEDURE — 1123F ACP DISCUSS/DSCN MKR DOCD: CPT | Performed by: ORTHOPAEDIC SURGERY

## 2021-11-18 PROCEDURE — 99213 OFFICE O/P EST LOW 20 MIN: CPT | Performed by: ORTHOPAEDIC SURGERY

## 2021-11-18 PROCEDURE — 4040F PNEUMOC VAC/ADMIN/RCVD: CPT | Performed by: ORTHOPAEDIC SURGERY

## 2021-11-18 PROCEDURE — G8399 PT W/DXA RESULTS DOCUMENT: HCPCS | Performed by: ORTHOPAEDIC SURGERY

## 2021-11-18 PROCEDURE — 3017F COLORECTAL CA SCREEN DOC REV: CPT | Performed by: ORTHOPAEDIC SURGERY

## 2021-11-18 PROCEDURE — G8427 DOCREV CUR MEDS BY ELIG CLIN: HCPCS | Performed by: ORTHOPAEDIC SURGERY

## 2021-11-18 PROCEDURE — 1090F PRES/ABSN URINE INCON ASSESS: CPT | Performed by: ORTHOPAEDIC SURGERY

## 2021-11-18 PROCEDURE — G8417 CALC BMI ABV UP PARAM F/U: HCPCS | Performed by: ORTHOPAEDIC SURGERY

## 2021-11-27 ENCOUNTER — APPOINTMENT (OUTPATIENT)
Dept: CT IMAGING | Age: 71
End: 2021-11-27
Payer: MEDICARE

## 2021-11-27 ENCOUNTER — HOSPITAL ENCOUNTER (EMERGENCY)
Age: 71
Discharge: HOME | End: 2021-11-27
Attending: EMERGENCY MEDICINE
Payer: MEDICARE

## 2021-11-27 VITALS
RESPIRATION RATE: 16 BRPM | TEMPERATURE: 98.1 F | WEIGHT: 170 LBS | OXYGEN SATURATION: 96 % | DIASTOLIC BLOOD PRESSURE: 98 MMHG | HEIGHT: 59 IN | SYSTOLIC BLOOD PRESSURE: 172 MMHG | HEART RATE: 67 BPM | BODY MASS INDEX: 34.27 KG/M2

## 2021-11-27 DIAGNOSIS — K57.32 DIVERTICULITIS OF COLON: ICD-10-CM

## 2021-11-27 DIAGNOSIS — R10.9 FLANK PAIN: Primary | ICD-10-CM

## 2021-11-27 DIAGNOSIS — T14.8XXA BRUISING: ICD-10-CM

## 2021-11-27 LAB
ANION GAP SERPL CALCULATED.3IONS-SCNC: 9 MMOL/L (ref 7–16)
BUN BLDV-MCNC: 17 MG/DL (ref 6–23)
CALCIUM SERPL-MCNC: 9.3 MG/DL (ref 8.6–10.2)
CHLORIDE BLD-SCNC: 105 MMOL/L (ref 98–107)
CO2: 27 MMOL/L (ref 22–29)
CREAT SERPL-MCNC: 0.8 MG/DL (ref 0.5–1)
GFR AFRICAN AMERICAN: >60
GFR NON-AFRICAN AMERICAN: >60 ML/MIN/1.73
GLUCOSE BLD-MCNC: 80 MG/DL (ref 74–99)
POTASSIUM REFLEX MAGNESIUM: 4.9 MMOL/L (ref 3.5–5)
SODIUM BLD-SCNC: 141 MMOL/L (ref 132–146)

## 2021-11-27 PROCEDURE — 74177 CT ABD & PELVIS W/CONTRAST: CPT

## 2021-11-27 PROCEDURE — 99283 EMERGENCY DEPT VISIT LOW MDM: CPT

## 2021-11-27 PROCEDURE — 36415 COLL VENOUS BLD VENIPUNCTURE: CPT

## 2021-11-27 PROCEDURE — 71260 CT THORAX DX C+: CPT

## 2021-11-27 PROCEDURE — 6360000004 HC RX CONTRAST MEDICATION: Performed by: RADIOLOGY

## 2021-11-27 PROCEDURE — 80048 BASIC METABOLIC PNL TOTAL CA: CPT

## 2021-11-27 RX ORDER — CEFDINIR 300 MG/1
300 CAPSULE ORAL 2 TIMES DAILY
Qty: 20 CAPSULE | Refills: 0 | Status: SHIPPED | OUTPATIENT
Start: 2021-11-27 | End: 2021-12-07

## 2021-11-27 RX ORDER — METRONIDAZOLE 500 MG/1
500 TABLET ORAL 3 TIMES DAILY
Qty: 30 TABLET | Refills: 0 | Status: SHIPPED | OUTPATIENT
Start: 2021-11-27 | End: 2021-12-07

## 2021-11-27 RX ADMIN — IOPAMIDOL 75 ML: 755 INJECTION, SOLUTION INTRAVENOUS at 15:50

## 2021-11-27 ASSESSMENT — ENCOUNTER SYMPTOMS
SHORTNESS OF BREATH: 0
EYE REDNESS: 0
ABDOMINAL PAIN: 0
VOMITING: 0
NAUSEA: 0

## 2021-11-27 ASSESSMENT — PAIN SCALES - GENERAL: PAINLEVEL_OUTOF10: 7

## 2021-11-27 ASSESSMENT — PAIN DESCRIPTION - LOCATION: LOCATION: RIB CAGE

## 2021-11-27 ASSESSMENT — PAIN DESCRIPTION - ORIENTATION: ORIENTATION: LEFT;POSTERIOR

## 2021-11-27 ASSESSMENT — PAIN DESCRIPTION - PAIN TYPE: TYPE: ACUTE PAIN

## 2021-11-27 ASSESSMENT — PAIN DESCRIPTION - DESCRIPTORS: DESCRIPTORS: ACHING

## 2021-11-27 NOTE — ED PROVIDER NOTES
Chief complaint: Fall and bruising      HPI:  11/27/21, Time: 3:00 PM EST    HPI           Ino Aldridge is a 70 y.o. female presenting to the ED for fall and bruising. The history is obtained from the patient as well as the patient's medical record. Patient is presenting emergency department the chief complaint of a fall bruising. Patient reports that she experienced a fall approximately 2 weeks ago. She did fall landing on her back. She did did hit her head but states she has not any blood thinners. She did not lose consciousness. She has no neurologic deficits. She has no neck pain. The patient did have posterior left rib pain. The patient was informed by a physician that she \"cracked a rib. \"I did review the patient's imaging which was negative for any acute fractures. The patient states that she woke up today with bruising located in her left posterior flank and is having pain which is described as stabbing sensation. Worse with movement. Pain currently rated 7 on 10. Nothing is better. Nothing makes it worse. No treatment prior to arrival.  Constant since onset. ROS:   Review of Systems   Constitutional: Negative for chills and fatigue. HENT: Negative for congestion. Eyes: Negative for redness. Respiratory: Negative for shortness of breath. Cardiovascular: Positive for chest pain (Posterior left rib pain). Gastrointestinal: Negative for abdominal pain, nausea and vomiting. Genitourinary: Negative for dysuria. Musculoskeletal: Negative for arthralgias. Skin: Negative for rash. Neurological: Negative for light-headedness. Psychiatric/Behavioral: Negative for confusion.    All other systems reviewed and are negative.      --------------------------------------------- PAST HISTORY ---------------------------------------------  Past Medical History:  has a past medical history of Acute MI ([facility] [address] CAD (coronary artery disease), Carotid artery occlusion, Diabetes mellitus [facility]), Diverticulitis, Hyperlipidemia, Hypertension, Obese, Osteoarthritis, and Thyroid nodule. Past Surgical History:  has a past surgical history that includes Diagnostic Cardiac Cath Lab Procedure; Carpal tunnel release; Hysterectomy; knee surgery (05/2017); Carpal tunnel release (Right, 12/13/2019); Finger trigger release (Right, 12/13/2019); Thyroidectomy (N/A, 01/16/2020); Upper gastrointestinal endoscopy (07/02/2021); and Upper gastrointestinal endoscopy (N/A, 7/2/2021). Social History:  reports that she quit smoking about 10 years ago. She started smoking about 53 years ago. She has a 43.00 pack-year smoking history. She has never used smokeless tobacco. She reports that she does not drink alcohol and does not use drugs. Family History: family history includes Diabetes in her father and paternal grandmother; Heart Disease in her father, maternal grandmother, mother, paternal grandmother, and sister. The patients home medications have been reviewed. Allergies: Patient has no known allergies. ---------------------------------------------------PHYSICAL EXAM--------------------------------------    Constitutional/General: Alert and oriented x3, well appearing, non toxic in NAD  Head: Normocephalic and atraumatic  Mouth: Oropharynx clear, handling secretions, no trismus  Neck: Supple, full ROM,  Pulmonary: Lungs clear to auscultation bilaterally, no wheezes, rales, or rhonchi. Not in respiratory distress  Cardiovascular:  Regular rate. Regular rhythm. No murmurs  Chest: no chest wall tenderness  Abdomen: Soft. Non tender. Non distended. No rebound, guarding, or rigidity. No pulsatile masses appreciated. Musculoskeletal: Moves all extremities x 4. Warm and well perfused, no clubbing, cyanosis, or edema. Capillary refill <3 seconds, small area of bruising over the left posterior flank  Skin: warm and dry. No rashes.    Neurologic: GCS 15, no gross focal neurologic deficits  Psych: Normal Affect    -------------------------------------------------- RESULTS -------------------------------------------------  I have personally reviewed all laboratory and imaging results for this patient. Results are listed below. LABS:  Results for orders placed or performed during the hospital encounter of 85/47/21   Basic Metabolic Panel w/ Reflex to MG   Result Value Ref Range    Sodium 141 132 - 146 mmol/L    Potassium reflex Magnesium 4.9 3.5 - 5.0 mmol/L    Chloride 105 98 - 107 mmol/L    CO2 27 22 - 29 mmol/L    Anion Gap 9 7 - 16 mmol/L    Glucose 80 74 - 99 mg/dL    BUN 17 6 - 23 mg/dL    CREATININE 0.8 0.5 - 1.0 mg/dL    GFR Non-African American >60 >=60 mL/min/1.73    GFR African American >60     Calcium 9.3 8.6 - 10.2 mg/dL       RADIOLOGY:  Interpreted by Radiologist.  CT CHEST W CONTRAST   Final Result   No acute traumatic injury in the chest.      There is COPD with coronary artery calcification. Linear filling defects in the descending thoracic and abdominal aorta likely   ulcerative plaque. Consider surveillance. There is no acute traumatic injury in the abdomen pelvis. Diverticulosis of the left hemicolon with mild thickening of the descending   and sigmoid colon which could be due to spasm or uncomplicated diverticulitis. 3.2 x 3.3 cm abdominal aortic aneurysm. 3 year surveillance is recommended. CT ABDOMEN PELVIS W IV CONTRAST Additional Contrast? None   Final Result   No acute traumatic injury in the chest.      There is COPD with coronary artery calcification. Linear filling defects in the descending thoracic and abdominal aorta likely   ulcerative plaque. Consider surveillance. There is no acute traumatic injury in the abdomen pelvis. Diverticulosis of the left hemicolon with mild thickening of the descending   and sigmoid colon which could be due to spasm or uncomplicated diverticulitis. 3.2 x 3.3 cm abdominal aortic aneurysm.   3 year recognition software.  Every effort was made to ensure accuracy; however, inadvertent computerized transcription errors may be present         Fritz Shepherd DO  11/28/21 [id number]

## 2021-12-07 DIAGNOSIS — I10 ESSENTIAL HYPERTENSION: ICD-10-CM

## 2021-12-07 NOTE — TELEPHONE ENCOUNTER
Last Appointment:  9/30/2021  Future Appointments   Date Time Provider Radha Spenceristi   12/21/2021 10:30 AM Rosa Gusman MD [facility]   1/6/2022  9:00 AM SCHEDULE, [place] MAO [facility]   1/13/2022 10:00 AM Kings Catalan DO [facility]   4/25/2023 10:30 AM [facility] VAS US RM [facility]   4/25/2023 11:00 AM Rosa Gusman MD [facility]

## 2021-12-08 DIAGNOSIS — I10 ESSENTIAL HYPERTENSION: ICD-10-CM

## 2021-12-08 RX ORDER — ATENOLOL 50 MG/1
50 TABLET ORAL DAILY
Qty: 90 TABLET | Refills: 1 | Status: SHIPPED
Start: 2021-12-08 | End: 2022-05-04

## 2021-12-08 RX ORDER — QUINAPRIL 5 1/1
TABLET ORAL
Qty: 90 TABLET | Refills: 2 | Status: SHIPPED
Start: 2021-12-08 | End: 2022-05-24 | Stop reason: SDUPTHER

## 2021-12-08 NOTE — TELEPHONE ENCOUNTER
Last Appointment:  9/30/2021  Future Appointments   Date Time Provider Radha Johnsoni   12/21/2021 10:30 AM Cortes Mckeon MD [facility]   1/6/2022  9:00 AM SCHEDULE, CONCETTA [place] MAO [facility]   1/13/2022 10:00 AM Pina Vail DO [facility]   4/25/2023 10:30 AM [facility] VAS US RM 1 SEYZ CARDIO [facility]   4/25/2023 11:00 AM Cortes Mckeon MD [facility]

## 2021-12-21 ENCOUNTER — OFFICE VISIT (OUTPATIENT)
Dept: VASCULAR SURGERY | Age: 71
End: 2021-12-21
Payer: MEDICARE

## 2021-12-21 VITALS
HEIGHT: 59 IN | WEIGHT: 170 LBS | OXYGEN SATURATION: 96 % | DIASTOLIC BLOOD PRESSURE: 70 MMHG | BODY MASS INDEX: 34.27 KG/M2 | RESPIRATION RATE: 18 BRPM | HEART RATE: 63 BPM | SYSTOLIC BLOOD PRESSURE: 122 MMHG

## 2021-12-21 DIAGNOSIS — I65.21 STENOSIS OF RIGHT CAROTID ARTERY: ICD-10-CM

## 2021-12-21 DIAGNOSIS — I71.40 AAA (ABDOMINAL AORTIC ANEURYSM) WITHOUT RUPTURE: Primary | ICD-10-CM

## 2021-12-21 PROCEDURE — 4040F PNEUMOC VAC/ADMIN/RCVD: CPT | Performed by: PHYSICIAN ASSISTANT

## 2021-12-21 PROCEDURE — 1036F TOBACCO NON-USER: CPT | Performed by: PHYSICIAN ASSISTANT

## 2021-12-21 PROCEDURE — G8417 CALC BMI ABV UP PARAM F/U: HCPCS | Performed by: PHYSICIAN ASSISTANT

## 2021-12-21 PROCEDURE — G8399 PT W/DXA RESULTS DOCUMENT: HCPCS | Performed by: PHYSICIAN ASSISTANT

## 2021-12-21 PROCEDURE — 3017F COLORECTAL CA SCREEN DOC REV: CPT | Performed by: PHYSICIAN ASSISTANT

## 2021-12-21 PROCEDURE — G8427 DOCREV CUR MEDS BY ELIG CLIN: HCPCS | Performed by: PHYSICIAN ASSISTANT

## 2021-12-21 PROCEDURE — 1123F ACP DISCUSS/DSCN MKR DOCD: CPT | Performed by: PHYSICIAN ASSISTANT

## 2021-12-21 PROCEDURE — 99214 OFFICE O/P EST MOD 30 MIN: CPT | Performed by: PHYSICIAN ASSISTANT

## 2021-12-21 PROCEDURE — 1090F PRES/ABSN URINE INCON ASSESS: CPT | Performed by: PHYSICIAN ASSISTANT

## 2021-12-21 PROCEDURE — G8484 FLU IMMUNIZE NO ADMIN: HCPCS | Performed by: PHYSICIAN ASSISTANT

## 2021-12-29 DIAGNOSIS — L30.9 DERMATITIS: ICD-10-CM

## 2021-12-29 NOTE — TELEPHONE ENCOUNTER
DR. Lawyer Ann,    It looks like this rx was discontinued in June / Is this something that is long term use?

## 2022-01-03 RX ORDER — CLOTRIMAZOLE AND BETAMETHASONE DIPROPIONATE 10; .64 MG/G; MG/G
CREAM TOPICAL
Qty: 45 G | Refills: 0 | Status: SHIPPED
Start: 2022-01-03 | End: 2022-01-14 | Stop reason: SDUPTHER

## 2022-01-06 DIAGNOSIS — E78.5 HYPERLIPIDEMIA, UNSPECIFIED HYPERLIPIDEMIA TYPE: ICD-10-CM

## 2022-01-06 DIAGNOSIS — I10 ESSENTIAL HYPERTENSION: ICD-10-CM

## 2022-01-06 DIAGNOSIS — E11.9 TYPE 2 DIABETES MELLITUS WITHOUT COMPLICATION, WITH LONG-TERM CURRENT USE OF INSULIN (HCC): ICD-10-CM

## 2022-01-06 DIAGNOSIS — Z79.4 TYPE 2 DIABETES MELLITUS WITHOUT COMPLICATION, WITH LONG-TERM CURRENT USE OF INSULIN (HCC): ICD-10-CM

## 2022-01-06 DIAGNOSIS — E04.1 THYROID NODULE: ICD-10-CM

## 2022-01-06 LAB
ALBUMIN SERPL-MCNC: 4.3 G/DL (ref 3.5–5.2)
ALP BLD-CCNC: 126 U/L (ref 35–104)
ALT SERPL-CCNC: 27 U/L (ref 0–32)
ANION GAP SERPL CALCULATED.3IONS-SCNC: 13 MMOL/L (ref 7–16)
AST SERPL-CCNC: 23 U/L (ref 0–31)
BASOPHILS ABSOLUTE: 0.05 E9/L (ref 0–0.2)
BASOPHILS RELATIVE PERCENT: 0.9 % (ref 0–2)
BILIRUB SERPL-MCNC: 0.3 MG/DL (ref 0–1.2)
BUN BLDV-MCNC: 20 MG/DL (ref 6–23)
CALCIUM SERPL-MCNC: 9.4 MG/DL (ref 8.6–10.2)
CHLORIDE BLD-SCNC: 102 MMOL/L (ref 98–107)
CHOLESTEROL, TOTAL: 166 MG/DL (ref 0–199)
CO2: 24 MMOL/L (ref 22–29)
CREAT SERPL-MCNC: 0.7 MG/DL (ref 0.5–1)
EOSINOPHILS ABSOLUTE: 0.24 E9/L (ref 0.05–0.5)
EOSINOPHILS RELATIVE PERCENT: 4.1 % (ref 0–6)
GFR AFRICAN AMERICAN: >60
GFR NON-AFRICAN AMERICAN: >60 ML/MIN/1.73
GLUCOSE BLD-MCNC: 147 MG/DL (ref 74–99)
HBA1C MFR BLD: 6.3 % (ref 4–5.6)
HCT VFR BLD CALC: 41 % (ref 34–48)
HDLC SERPL-MCNC: 41 MG/DL
HEMOGLOBIN: 13.2 G/DL (ref 11.5–15.5)
IMMATURE GRANULOCYTES #: 0.02 E9/L
IMMATURE GRANULOCYTES %: 0.3 % (ref 0–5)
LDL CHOLESTEROL CALCULATED: 90 MG/DL (ref 0–99)
LYMPHOCYTES ABSOLUTE: 2.06 E9/L (ref 1.5–4)
LYMPHOCYTES RELATIVE PERCENT: 35 % (ref 20–42)
MCH RBC QN AUTO: 29.5 PG (ref 26–35)
MCHC RBC AUTO-ENTMCNC: 32.2 % (ref 32–34.5)
MCV RBC AUTO: 91.5 FL (ref 80–99.9)
MONOCYTES ABSOLUTE: 0.48 E9/L (ref 0.1–0.95)
MONOCYTES RELATIVE PERCENT: 8.2 % (ref 2–12)
NEUTROPHILS ABSOLUTE: 3.03 E9/L (ref 1.8–7.3)
NEUTROPHILS RELATIVE PERCENT: 51.5 % (ref 43–80)
PDW BLD-RTO: 13.2 FL (ref 11.5–15)
PLATELET # BLD: 254 E9/L (ref 130–450)
PMV BLD AUTO: 10 FL (ref 7–12)
POTASSIUM SERPL-SCNC: 5.1 MMOL/L (ref 3.5–5)
RBC # BLD: 4.48 E12/L (ref 3.5–5.5)
SODIUM BLD-SCNC: 139 MMOL/L (ref 132–146)
T4 FREE: 1.65 NG/DL (ref 0.93–1.7)
TOTAL PROTEIN: 7.3 G/DL (ref 6.4–8.3)
TRIGL SERPL-MCNC: 175 MG/DL (ref 0–149)
TSH SERPL DL<=0.05 MIU/L-ACNC: 0.66 UIU/ML (ref 0.27–4.2)
VLDLC SERPL CALC-MCNC: 35 MG/DL
WBC # BLD: 5.9 E9/L (ref 4.5–11.5)

## 2022-01-14 ENCOUNTER — OFFICE VISIT (OUTPATIENT)
Dept: FAMILY MEDICINE CLINIC | Age: 72
End: 2022-01-14
Payer: MEDICARE

## 2022-01-14 VITALS
RESPIRATION RATE: 20 BRPM | SYSTOLIC BLOOD PRESSURE: 126 MMHG | TEMPERATURE: 97.2 F | DIASTOLIC BLOOD PRESSURE: 75 MMHG | HEART RATE: 86 BPM | HEIGHT: 59 IN | WEIGHT: 170.6 LBS | BODY MASS INDEX: 34.39 KG/M2

## 2022-01-14 DIAGNOSIS — Z79.4 TYPE 2 DIABETES MELLITUS WITHOUT COMPLICATION, WITH LONG-TERM CURRENT USE OF INSULIN (HCC): Primary | ICD-10-CM

## 2022-01-14 DIAGNOSIS — E11.9 TYPE 2 DIABETES MELLITUS WITHOUT COMPLICATION, WITH LONG-TERM CURRENT USE OF INSULIN (HCC): Primary | ICD-10-CM

## 2022-01-14 DIAGNOSIS — E78.5 HYPERLIPIDEMIA, UNSPECIFIED HYPERLIPIDEMIA TYPE: ICD-10-CM

## 2022-01-14 DIAGNOSIS — L30.9 DERMATITIS: ICD-10-CM

## 2022-01-14 DIAGNOSIS — F32.1 MAJOR DEPRESSIVE DISORDER, SINGLE EPISODE, MODERATE (HCC): ICD-10-CM

## 2022-01-14 DIAGNOSIS — Z12.11 COLON CANCER SCREENING: ICD-10-CM

## 2022-01-14 DIAGNOSIS — I71.40 AAA (ABDOMINAL AORTIC ANEURYSM) WITHOUT RUPTURE: ICD-10-CM

## 2022-01-14 PROCEDURE — 2022F DILAT RTA XM EVC RTNOPTHY: CPT | Performed by: FAMILY MEDICINE

## 2022-01-14 PROCEDURE — 99214 OFFICE O/P EST MOD 30 MIN: CPT | Performed by: FAMILY MEDICINE

## 2022-01-14 PROCEDURE — G8427 DOCREV CUR MEDS BY ELIG CLIN: HCPCS | Performed by: FAMILY MEDICINE

## 2022-01-14 PROCEDURE — 1090F PRES/ABSN URINE INCON ASSESS: CPT | Performed by: FAMILY MEDICINE

## 2022-01-14 PROCEDURE — G8484 FLU IMMUNIZE NO ADMIN: HCPCS | Performed by: FAMILY MEDICINE

## 2022-01-14 PROCEDURE — 1036F TOBACCO NON-USER: CPT | Performed by: FAMILY MEDICINE

## 2022-01-14 PROCEDURE — 1123F ACP DISCUSS/DSCN MKR DOCD: CPT | Performed by: FAMILY MEDICINE

## 2022-01-14 PROCEDURE — 3017F COLORECTAL CA SCREEN DOC REV: CPT | Performed by: FAMILY MEDICINE

## 2022-01-14 PROCEDURE — 3044F HG A1C LEVEL LT 7.0%: CPT | Performed by: FAMILY MEDICINE

## 2022-01-14 PROCEDURE — G8417 CALC BMI ABV UP PARAM F/U: HCPCS | Performed by: FAMILY MEDICINE

## 2022-01-14 PROCEDURE — G8399 PT W/DXA RESULTS DOCUMENT: HCPCS | Performed by: FAMILY MEDICINE

## 2022-01-14 PROCEDURE — 4040F PNEUMOC VAC/ADMIN/RCVD: CPT | Performed by: FAMILY MEDICINE

## 2022-01-14 RX ORDER — CLOTRIMAZOLE AND BETAMETHASONE DIPROPIONATE 10; .64 MG/G; MG/G
CREAM TOPICAL
Qty: 45 G | Refills: 0 | Status: SHIPPED | OUTPATIENT
Start: 2022-01-14

## 2022-01-14 NOTE — PROGRESS NOTES
Saleem Delarosa  : 1950    Chief Complaint:     Chief Complaint   Patient presents with    Establish Care       HPI  Saleem Delarosa 70 y.o. presents for   Chief Complaint   Patient presents with   [facility].     Patient presents as a new patient to establish care. Previous PCP within [facility]. She does have a hx of diabetes, HLD, AAA, depression. She has been taking her medication as prescribed. She denies any current issues with the above. she does need a refill of the lotrisone cream to use as needed. All questions were answered to patients satisfaction. Past Medical History:   Diagnosis Date    Acute MI ([facility] [address] 08    CAD (coronary artery disease)     Dr. Reji Navas Carotid artery occlusion     Diabetes mellitus ([facility] [address]     Diverticulitis     Hyperlipidemia     Hypertension     Obese     Osteoarthritis     Thyroid nodule        No Known Allergies    Health Maintenance Due   Topic Date Due    Shingles Vaccine (2 of 3) 2013    Colon Cancer Screen FIT/FOBT  2018    DTaP/Tdap/Td vaccine (2 - Td or Tdap) 2020         REVIEW OF SYSTEMS  Review of Systems   Constitutional: Negative for fatigue and fever. HENT: Negative for ear pain, sinus pressure, sneezing and sore throat. Eyes: Negative for pain. Respiratory: Negative for cough and shortness of breath. Cardiovascular: Negative for chest pain and leg swelling. Gastrointestinal: Negative for abdominal pain, constipation and diarrhea. Genitourinary: Negative for dysuria and urgency. Musculoskeletal: Negative for back pain and myalgias. Skin: Positive for rash. Allergic/Immunologic: Negative for food allergies. Neurological: Negative for light-headedness and headaches. Hematological: Does not bruise/bleed easily. Psychiatric/Behavioral: Negative for behavioral problems and sleep disturbance.        PHYSICAL EXAM  /75   Pulse 86   Temp 97.2 °F (36.2 °C)   Resp 20   Ht 4' 11\" (1.499 m)   Wt 170 lb 9.6 oz (77.4 kg)   BMI 34.46 kg/m²   Physical Exam  Vitals and nursing note reviewed. Constitutional:       Appearance: She is well-developed. HENT:      Head: Normocephalic and atraumatic. Right Ear: External ear normal.      Left Ear: External ear normal.      Nose: Nose normal.   Eyes:      Conjunctiva/sclera: Conjunctivae normal.   Neck:      Thyroid: No thyromegaly. Cardiovascular:      Rate and Rhythm: Normal rate and regular rhythm. Heart sounds: Normal heart sounds. No murmur heard. Pulmonary:      Effort: Pulmonary effort is normal.      Breath sounds: Normal breath sounds. No wheezing. Abdominal:      Palpations: Abdomen is soft. Tenderness: There is no abdominal tenderness. Musculoskeletal:         General: No tenderness. Normal range of motion. Cervical back: Normal range of motion and neck supple. Lymphadenopathy:      Cervical: No cervical adenopathy. Skin:     General: Skin is warm and dry. Findings: No rash. Neurological:      Mental Status: She is alert and oriented to person, place, and time. Deep Tendon Reflexes: Reflexes are normal and symmetric. Psychiatric:         Behavior: Behavior normal.              Laboratory:   All laboratory and radiology results have been personally reviewed by myself    Lab Results   Component Value Date     01/06/2022    K 5.1 01/06/2022    K 4.9 11/27/2021     01/06/2022    CO2 24 01/06/2022    BUN 20 01/06/2022    CREATININE 0.7 01/06/2022    PROT 7.3 01/06/2022    LABALBU 4.3 01/06/2022    CALCIUM 9.4 01/06/2022    GFRAA >60 01/06/2022    LABGLOM >60 01/06/2022    GLUCOSE 147 01/06/2022    AST 23 01/06/2022    ALT 27 01/06/2022    ALKPHOS 126 01/06/2022    BILITOT 0.3 01/06/2022    TSH 0.655 01/06/2022    VITD25 19 04/19/2021    CHOL 166 01/06/2022    TRIG 175 01/06/2022    HDL 41 01/06/2022    LDLCALC 90 01/06/2022    LABA1C 6.3 01/06/2022        Lab Results   Component Value Date    CHOL 166 01/06/2022     Lab Results   Component Value Date    TRIG 175 (H) 01/06/2022     Lab Results   Component Value Date    HDL 41 01/06/2022     Lab Results   Component Value Date    LDLCALC 90 01/06/2022       Lab Results   Component Value Date    LABA1C 6.3 (H) 01/06/2022     Lab Results   Component Value Date    LABMICR <12.0 04/19/2021    LDLCALC 90 01/06/2022    CREATININE 0.7 01/06/2022       ASSESSMENT/PLAN:    1. Type 2 diabetes mellitus without complication, with long-term current use of insulin ([facility] [address]  2. Dermatitis  -     clotrimazole-betamethasone (LOTRISONE) 1-0.05 % cream; APPLY  CREAM TOPICALLY TWICE DAILY, Disp-45 g, R-0, Normal  3. Hyperlipidemia, unspecified hyperlipidemia type  4. Major depressive disorder, single episode, moderate ([address]  5. AAA (abdominal aortic aneurysm) without rupture ([facility] [address]  6. Colon cancer screening  -     Ambulatory referral to General Surgery     Reviewed labs in detail today - all within normal limits. We also reviewed medications will continue all medications unless otherwise noted. Referral placed for colon cancer screening. Problem list reviewed andsimplified/updated  HM reviewed today and counseled as appropriate    Call or go to ED immediately if symptoms worsen or persist.  Future Appointments   Date Time Provider Radha Vargas   7/14/2022  9:30 AM Blanca 5DO [facility]   4/25/2023 11:00 AM Popeye Dorsey MD [facility]     Or sooner if necessary. Educational materials and/or homeexercises printed for patient's review and were included in patient instructions on his/her After Visit Summary and given to patient at the end of visit. Counseled regarding above diagnosis, including possible risks and complications,  especially if left uncontrolled.      Counseled regarding the possible side effects, risks, benefits and alternatives to treatment; patient and/or guardian verbalizes understanding, agrees,feels comfortable with and wishes to proceed with above treatment plan. Advised patient to call Verneice Panchito new medication issues, and read all Rx info from pharmacy to assure aware of all possible risks and side effects of medication before taking. Reviewed age and gender appropriate health screening exams and vaccinations. Advised patient regarding importance of keeping up with recommended health maintenance and toschedule as soon as possible if overdue, as this is important in assessing for undiagnosed pathology, especially cancer, as well as protecting against potentially harmful/life threatening disease. and/or guardian verbalizes understanding and agrees with above counseling, assessment and plan. All questions answered. Blanca Adame, DO  1/14/22    NOTE: This report was transcribed using voice recognition software.  Every effort was made to ensure accuracy; however, inadvertent computerized transcription errors may be present

## 2022-01-17 ASSESSMENT — ENCOUNTER SYMPTOMS
CONSTIPATION: 0
COUGH: 0
SINUS PRESSURE: 0
DIARRHEA: 0
ABDOMINAL PAIN: 0
BACK PAIN: 0
SHORTNESS OF BREATH: 0
SORE THROAT: 0
EYE PAIN: 0

## 2022-01-26 ENCOUNTER — TELEPHONE (OUTPATIENT)
Dept: SURGERY | Age: 72
End: 2022-01-26

## 2022-01-26 ENCOUNTER — OFFICE VISIT (OUTPATIENT)
Dept: SURGERY | Age: 72
End: 2022-01-26
Payer: MEDICARE

## 2022-01-26 VITALS
HEIGHT: 59 IN | OXYGEN SATURATION: 95 % | HEART RATE: 76 BPM | BODY MASS INDEX: 34.27 KG/M2 | RESPIRATION RATE: 16 BRPM | SYSTOLIC BLOOD PRESSURE: 159 MMHG | TEMPERATURE: 98.8 F | DIASTOLIC BLOOD PRESSURE: 94 MMHG | WEIGHT: 170 LBS

## 2022-01-26 DIAGNOSIS — Z91.89 AT RISK FOR COLON CANCER: ICD-10-CM

## 2022-01-26 DIAGNOSIS — Z86.19 HISTORY OF HELICOBACTER PYLORI INFECTION: ICD-10-CM

## 2022-01-26 DIAGNOSIS — R19.4 CHANGE IN BOWEL HABITS: Primary | ICD-10-CM

## 2022-01-26 DIAGNOSIS — K92.1 BLOOD IN STOOL: ICD-10-CM

## 2022-01-26 PROCEDURE — 1123F ACP DISCUSS/DSCN MKR DOCD: CPT | Performed by: SURGERY

## 2022-01-26 PROCEDURE — 3017F COLORECTAL CA SCREEN DOC REV: CPT | Performed by: SURGERY

## 2022-01-26 PROCEDURE — 1036F TOBACCO NON-USER: CPT | Performed by: SURGERY

## 2022-01-26 PROCEDURE — G8417 CALC BMI ABV UP PARAM F/U: HCPCS | Performed by: SURGERY

## 2022-01-26 PROCEDURE — 4040F PNEUMOC VAC/ADMIN/RCVD: CPT | Performed by: SURGERY

## 2022-01-26 PROCEDURE — G8399 PT W/DXA RESULTS DOCUMENT: HCPCS | Performed by: SURGERY

## 2022-01-26 PROCEDURE — G8484 FLU IMMUNIZE NO ADMIN: HCPCS | Performed by: SURGERY

## 2022-01-26 PROCEDURE — 1090F PRES/ABSN URINE INCON ASSESS: CPT | Performed by: SURGERY

## 2022-01-26 PROCEDURE — G8427 DOCREV CUR MEDS BY ELIG CLIN: HCPCS | Performed by: SURGERY

## 2022-01-26 PROCEDURE — 99214 OFFICE O/P EST MOD 30 MIN: CPT | Performed by: SURGERY

## 2022-01-26 ASSESSMENT — ENCOUNTER SYMPTOMS
DIARRHEA: 1
SHORTNESS OF BREATH: 0
ABDOMINAL PAIN: 1
CONSTIPATION: 1
VOMITING: 0
WHEEZING: 0
BACK PAIN: 1
NAUSEA: 0
ANAL BLEEDING: 0
CHEST TIGHTNESS: 0
COLOR CHANGE: 0
EYES NEGATIVE: 1
COUGH: 0
ABDOMINAL DISTENTION: 0
CHOKING: 0
BLOOD IN STOOL: 1

## 2022-01-26 NOTE — TELEPHONE ENCOUNTER
Prior Authorization Form:      DEMOGRAPHICS:                     Patient Name:  Beth Rashid  Patient :  1950            Insurance:  Payor: Ezra Zavaleta / Plan: Shasta Ortiz GOLD PLUS HMO / Product Type: *No Product type* /   Insurance ID Number:    Payor/Plan Subscr  Sex Relation Sub. Ins. ID Effective Group Num   1.  Shasta Milindjinle [employer]*  Female Self [id number]                                    [address] [id number]         DIAGNOSIS & PROCEDURE:                       Procedure/Operation: EGD & COLONOSCOPY           CPT Code: 08062, 62413    Diagnosis:  HHISTORY OF HELICOBACTER PYLORI INFECTION, CHANGE IN BOWEL HABIT    ICD10 Code: Z86.19, R19.4    Location:  [facility]    Surgeon:  DR. Orquidea Mcintyre    SCHEDULING INFORMATION:                          Date: 3/10/22    Time: 11AM              Anesthesia:  LMAC                                                       Status: OUTPATIENT       Special Comments:  N/A       Electronically signed by Alena Wells MA on 2022 at 3:09 PM

## 2022-01-26 NOTE — PATIENT INSTRUCTIONS
Call [phone] for any questions/concerns. Patient Information and Instructions for  Upper GI Endoscopy or Esophagogastroduodenoscopy [EGD])         Definition Upper GI Endoscopy or Esophagogastroduodenoscopy [EGD])  This is a test that uses a fiberoptic scope to examine the esophagus (throat), stomach, and upper part of the small intestines. Upper GI endoscopy may be recommended if you have:   Abdominal pain   Severe heartburn   Persistent nausea and vomiting   Difficulty swallowing   Blood in stool or vomit   Abnormal x-ray or other examinations of the gastrointestinal tract     Conditions that can be diagnosed with upper GI endoscopy include:   Ulcers   Tumors   Polyps   Abnormal narrowing   Inflammation     Possible Complications   Complications are rare, but no procedure is completely free of risk. If you are planning to have upper GI endoscopy, your doctor will review a list of possible complications, which may include:   Bleeding   Damage to the esophagus, stomach, or intestine   Infection   Respiratory depression (reduced breathing rate and/or depth)   Reaction to sedatives or anesthesia causing your blood pressure to drop    Some factors that may increase the risk of complications include:   Age: 61 or older   Pregnancy   Obesity   Smoking , alcoholism , or drug use   Malnutrition   Recent illness   Diabetes   Heart or lung problems   Bleeding disorders   Use of certain medicines     Be sure to discuss these risks with your doctor before the test.     What to Expect   Prior to test   Leading up to the test:   Arrange for a ride home after the test. Also, arrange for help at home. The night before, eat a light meal.  Do not eat or drink anything after midnight the night before the test.   Talk to your doctor about your medicines.  You may be asked to stop taking some medicines up to one week before the procedure, like:   Anti-inflammatory drugs (e.g., aspirin )   Blood thinners, like clopidogrel (Plavix) or warfarin (Coumadin)     Description of the Test   You will be asked to lie on your left side. You will have monitors tracking your breathing, heart rate, and blood oxygen levels. You will be given supplemental oxygen to breathe through your nose. A mouthpiece will be positioned to help keep your mouth open. Your throat may be sprayed with a numbing medicine. You will be given a sedative through an IV to help you relax during the test.  During the test, a small suction tube will be used to clear saliva and fluids from your mouth. The endoscope will be lubricated and placed in your mouth. You will be asked to try to swallow it. Then, it will be carefully and slowly advanced down your throat. It will be passed through your esophagus and into your stomach and intestine. While the endoscope is being advanced, your doctor will view the images on the screen. Air will be passed through the endoscope into your digestive tract. This will be done to smooth the normal folds in the tissues, allowing your doctor to view the tissue more easily. Tiny tools may be passed through the endoscope in order to take biopsies or do other tests. After Test   After the test, you will be observed for an hour. Then, you will be allowed to go home. When you return home after the test, do the following to help ensure a smooth recovery:   Rest when you get home. Ask your doctor if you can resume your normal diet. In most cases, you will be able to. Sedatives can slow your reaction time. Do not drive or use machinery for the rest of the day. Avoid alcohol for the rest of the day. Be sure to follow your doctor's instructions . How Long Will It Take? Usually about 10-15 minutes     Will It Hurt? Most people do not feel anything during the test and will not remember the test.  After the test, your throat may be sore and you may feel bloated.      Results   This test gives your doctor information about the health of your digestive system. The results can help to explain your symptoms. You and your doctor will talk about the results and your treatment plan. Call Your Doctor   After the test, call your doctor if any of the following occurs:   Signs of infection, including fever and chills   Severe abdominal pain   Hard, swollen abdomen   Difficulty swallowing or breathing   Any change or increase in your original symptoms   Bloody or black tarry colored stools   Nausea and/or vomiting   Cough, shortness of breath, or chest pain   Bleeding     In case of emergency, call 911. Patient Information and Instructions for Colonoscopy         Definition of Colonoscopy   A colonoscopy is the visual exam of the rectum and colon (large intestine). The exam is done with a tool called a colonoscope. The colonoscope is a flexible tube with a tiny camera on the end. This instrument allows the doctor to view the inside of your rectum and colon. Sigmoidoscopy is a shorter scope that views only the last one third of the colon. Reasons for Colonoscopy   It is used to examine, diagnose, and treat problems in your large intestine. The procedure is most often done for the following reasons: To determine the cause of abdominal pain, rectal bleeding, or a change in bowel habits   To detect and treat colon cancer or colon polyps   To obtain tissue samples for testing   To stop intestinal bleeding   Monitor response to treatment if you have inflammatory bowel disease     Possible Complications   Complications are rare, but no procedure is completely free of risk.  If you are planning to have a colonoscopy, your doctor will review a list of possible complications, which may include:   Bleeding   Reaction to the sedation causing drop in your blood pressure or problems breathing  Perforation or puncture of the bowel     Factors that may increase the risk of complications include:   Pre-existing heart or kidney condition   Treatment with certain medicines, including aspirin and other drugs with anticoagulant or blood-thinning properties   Prior abdominal surgery or radiation treatments   Active colitis , diverticulitis , or other acute bowel disease   Previous treatment with radiation therapy     Be sure to discuss these risks with your doctor before the procedure. What to Expect   Prior to Procedure   Your doctor will likely do the following:   Physical exam   Health history   Review of medicines   Test your stool for hidden blood (called \"occult blood\")     Your colon must be completely clean before the procedure. Any stool left in the intestine will block the view. This preparation may start several days before the procedure. Follow your doctor's instructions. Leading up to your procedure:   Talk to your doctor about your medicines. You may be asked to stop taking some medicines up to one week before the procedure, like:   Anti-inflammatory drugs (e.g., aspirin )   Blood thinners like clopidogrel (Plavix) or warfarin (Coumadin)   Iron supplements or vitamins containing iron   The day or days before your procedure, go on a clear liquid diet (clear broth, clear juice, clear jello) with no red coloring  Do not eat or drink anything after midnight. Wear comfortable clothing. If you have diabetes, ask your doctor if you need to adjust your diabetes medicine on the day prior to your procedure and the day of your procedure. Arrange for a ride home after the procedure. Anesthesia   You will receive intravenous sedation medicine for the procedure so you will not feel anything during the procedure. Description of the Procedure   You will lie on your left side with knees bent and drawn up toward your chest. The colonoscope will be slowly inserted through the rectum and into the bowel. The colonoscope will inject air into the colon. A small attached video camera will allow the doctor to view the colon's lining on a screen.  The doctor will continue guiding the tool through the bowel and assess the lining. A tissue sample or polyps may be removed during the procedure. How Long Will It Take? Usually it takes about 30 to 45 minutes     Will It Hurt? Most people do not feel anything during the procedure and will not remember the procedure. After the procedure, gas pains and cramping are common. These pains should go away with the passing of gas. Post-procedure Care   If any tissue was removed: It will be sent to a lab to be examined. It may take 1-2 weeks for results. The doctor will usually give an initial report after the scope is removed. Other tests may be recommended. A small amount of bleeding may occur during the first few days after the procedure. When you return home after the procedure, be sure to follow your doctor's instructions, which may include:   Resume medicines as instructed by your doctor. Resume normal diet, unless directed otherwise by your doctor. The sedative will make you drowsy. Avoid driving, operating machinery, or making important decisions for the rest of the day. Rest for the remainder of the day. After arriving home, contact your doctor if any of the following occurs:   Bleeding from your rectum, notify your doctor if you pass a teaspoonful of blood or more. Black, tarry stools   Severe abdominal pain   Hard, swollen abdomen   Signs of infection, including fever or chills   Inability to pass gas or stool   Coughing, shortness of breath, chest pain, severe nausea or vomiting     In case of an emergency, CALL 911 . GATORADE COLONOSCOPY PREPARATION     **No aspirin, aspirin by-products or plavix for 1 week prior to your colonscopy. No coumadin for 5 days or check with your physician who orders the coumadin. Please contact the physican that prescribed the asprin, plavix, and coumadin to see if this is acceptable. **    Purchase these over the counter laxatives:  1.  GATORADE (64 ounces)

## 2022-01-26 NOTE — PROGRESS NOTES
Subjective:      Patient ID: Eva Barnes is a 70 y.o. female. HPI  70 yr old female referred by Dr. Ramu Phipps for colonoscopy eval.  Pt denies prior colonoscopy. Pt underwent EGD 7/2021 and found to be H.pylori positive. States she took Abx but never had repeat scope. Reports she is not taking the sucralfate but is still using Protonix. Reports ongoing heartburn and indigestion. States she has occasional abd pain--mainly on left side. Occasionally travels to back. .  Reports alternating diarrhea and constipation which is a change in her bowel habits. States she has been incontinent of stool due to extreme diarrhea at times. Reports seeing bright red blood mixed in with stool. Denies unintentional weight loss or family hx of colon cancer.     Past Medical History:   Diagnosis Date    Acute MI ([facility] [address] 07/11/08    CAD (coronary artery disease)     Dr. Sheryl Martinez Carotid artery occlusion     Diabetes mellitus ([facility] [address]     Diverticulitis     Hyperlipidemia     Hypertension     Obese     Osteoarthritis     Thyroid nodule        Past Surgical History:   Procedure Laterality Date    CARPAL TUNNEL RELEASE      (L)    CARPAL TUNNEL RELEASE Right 12/13/2019    RIGHT CARPAL TUNNEL RELEASE ENDOSCOPIC performed by Jamel Deshpande MD at [facility] CATH LAB PROCEDURE      stent placed    [place] Right 12/13/2019    RIGHT THUMB TRIGGER RELEASE performed by Jamel Deshpande MD at [facility]  05/2017    LT knee torn meniscus / DR Espinal  2017    THYROIDECTOMY N/A 01/16/2020    TOTAL THYROIDECTOMY--NERVE INTEGRITY MONITOR performed by Gabby Marshall DO at [place] [address]  07/02/2021    severe gastritis--jose angel    UPPER GASTROINTESTINAL ENDOSCOPY N/A 7/2/2021    EGD BIOPSY performed by Susan Wallis MD at [facility] ENDOSCOPY       Current Outpatient Medications   Medication Sig Dispense Refill    clotrimazole-betamethasone (LOTRISONE) 1-0.05 % cream APPLY  CREAM TOPICALLY TWICE DAILY 45 g 0    atenolol (TENORMIN) 50 MG tablet Take 1 tablet by mouth daily 90 tablet 1    quinapril (ACCUPRIL) 5 MG tablet TAKE 1 TABLET EVERY NIGHT 90 tablet 2    Insulin Pen Needle (DROPLET PEN NEEDLES) 31G X 6 MM MISC Inject 1 each into the skin 3 times daily 100 each 5    pantoprazole (PROTONIX) 40 MG tablet Take 1 tablet by mouth daily 90 tablet 1    levothyroxine (SYNTHROID) 100 MCG tablet TAKE 1 TABLET EVERY DAY 90 tablet 1    simvastatin (ZOCOR) 80 MG tablet TAKE 1 TABLET EVERY NIGHT 90 tablet 2    Alcohol Swabs (B-D SINGLE USE SWABS REGULAR) PADS Apply 1 each topically 3 times daily USE AS DIRECTED AS NEEDED  TO TEST BLOOD SUGAR 300 each 5    ibuprofen (ADVIL;MOTRIN) 800 MG tablet Take 1 tablet by mouth 2 times daily as needed for Pain 60 tablet 0    blood glucose test strips (ACCU-CHEK STEVEN PLUS) strip TEST TWO TIMES DAILY 200 strip 5    venlafaxine (EFFEXOR XR) 150 MG extended release capsule TAKE 1 CAPSULE EVERY DAY 90 capsule 3    Accu-Chek Softclix Lancets MISC TEST TWO TIMES DAILY 200 each 2    acetaminophen (TYLENOL) 500 MG tablet Take 1 tablet by mouth 4 times daily as needed for Pain 360 tablet 1    aspirin 81 MG EC tablet Take 1 tablet by mouth daily LD 12-4-19 (Patient taking differently: Take 81 mg by mouth daily ) 90 tablet 1    Blood Glucose Monitoring Suppl (ACCU-CHEK STEVEN PLUS) w/Device KIT CHECK BLOOD SUGAR TWICE DAILY 1 kit 0     Current Facility-Administered Medications   Medication Dose Route Frequency Provider Last Rate Last Admin    lidocaine-EPINEPHrine 1 %-[id number] injection 20 mL  20 mL IntraDERmal Once Aurora Chandra MD           No Known Allergies    Family History   Problem Relation Age of Onset    Heart Disease Mother     Heart Disease Father     Diabetes Father     Heart Disease Sister     Heart Disease Maternal Grandmother     Heart Disease Paternal Grandmother    [place] Diabetes Paternal Grandmother        Social History     Socioeconomic History    Marital status:      Spouse name: Not on file    Number of children: Not on file    Years of education: Not on file    Highest education level: Not on file   Occupational History    Occupation: retired    Tobacco Use    Smoking status: Former Smoker     Packs/day: 1.00     Years: 43.00     Pack years: 43.00     Start date: 1968     Quit date: 3/18/2011     Years since quitting: 10.8    Smokeless tobacco: Never Used   Vaping Use    Vaping Use: Never used   Substance and Sexual Activity    Alcohol use: No    Drug use: No    Sexual activity: Yes   Other Topics Concern    Not on file   Social History Narrative    Not on file     Social Determinants of Health     Financial Resource Strain: High Risk    Difficulty of Paying Living Expenses: Very hard   Food Insecurity: No Food Insecurity    Worried About Running Out of Food in the Last Year: Never true    Margarito of Food in the Last Year: Never true   Transportation Needs:     Lack of Transportation (Medical): Not on file    Lack of Transportation (Non-Medical):  Not on file   Physical Activity:     Days of Exercise per Week: Not on file    Minutes of Exercise per Session: Not on file   Stress:     Feeling of Stress : Not on file   Social Connections:     Frequency of Communication with Friends and Family: Not on file    Frequency of Social Gatherings with Friends and Family: Not on file    Attends Evangelical Services: Not on file    Active Member of Clubs or Organizations: Not on file    Attends Club or Organization Meetings: Not on file    Marital Status: Not on file   Intimate Partner Violence:     Fear of Current or Ex-Partner: Not on file    Emotionally Abused: Not on file    Physically Abused: Not on file    Sexually Abused: Not on file   Housing Stability:     Unable to Pay for Housing in the Last Year: Not on file    Number of Places Lived in the Last Year: Not on file    Unstable Housing in the Last Year: Not on file     Review of Systems   Constitutional: Negative for activity change, appetite change, chills, fever and unexpected weight change. HENT: Negative. Eyes: Negative. Respiratory: Negative for cough, choking, chest tightness, shortness of breath and wheezing. Cardiovascular: Negative for chest pain, palpitations and leg swelling. Gastrointestinal: Positive for abdominal pain, blood in stool, constipation and diarrhea. Negative for abdominal distention, anal bleeding, nausea and vomiting. Endocrine: Positive for heat intolerance. Negative for cold intolerance, polydipsia and polyuria. Genitourinary: Negative for dysuria, frequency, hematuria, urgency, vaginal bleeding, vaginal discharge and vaginal pain. Musculoskeletal: Positive for back pain, gait problem and joint swelling. Negative for arthralgias, myalgias, neck pain and neck stiffness. Skin: Negative for color change, pallor, rash and wound. Allergic/Immunologic: Negative for environmental allergies and food allergies. Neurological: Negative for dizziness, seizures, syncope, weakness, light-headedness, numbness and headaches. Hematological: Negative for adenopathy. Does not bruise/bleed easily. Psychiatric/Behavioral: Negative for agitation, confusion, decreased concentration, hallucinations, self-injury and suicidal ideas. The patient is not nervous/anxious and is not hyperactive. Objective:   Physical Exam  Constitutional:       General: She is not in acute distress. Appearance: Normal appearance. She is obese. She is not ill-appearing, toxic-appearing or diaphoretic. HENT:      Head: Normocephalic and atraumatic. Nose: Nose normal.      Mouth/Throat:      Mouth: Mucous membranes are moist.      Pharynx: Oropharynx is clear. Eyes:      General: No scleral icterus. Right eye: No discharge. Left eye: No discharge. Extraocular Movements: Extraocular movements intact. Pupils: Pupils are equal, round, and reactive to light. Cardiovascular:      Rate and Rhythm: Normal rate and regular rhythm. Heart sounds: Normal heart sounds. No murmur heard. Pulmonary:      Effort: Pulmonary effort is normal. No respiratory distress. Breath sounds: Normal breath sounds. No stridor. No wheezing, rhonchi or rales. Abdominal:      General: Bowel sounds are normal. There is no distension. Palpations: Abdomen is soft. There is no mass. Tenderness: There is no abdominal tenderness. There is no guarding or rebound. Hernia: No hernia is present. Musculoskeletal:         General: Normal range of motion. Cervical back: Normal range of motion and neck supple. Skin:     General: Skin is warm and dry. Neurological:      General: No focal deficit present. Mental Status: She is alert and oriented to person, place, and time. Psychiatric:         Mood and Affect: Mood normal.         Behavior: Behavior normal.         Thought Content: Thought content normal.         Judgment: Judgment normal.       Dr. Kyra Lee notes from 1/14/2022 personally reviewed by me. CT scan from 11/27/21 personally reviewed--sigmoid diverticula  Labs from 1/6/22 personally reviewed--no anemia; no leukocystosis    Assessment:      Change in bowel habits  Blood in stool  At risk for colon cancer  heeartburn/indigestion  Hx of H.pylori         Plan:      Recommend EGD/colonoscopy. The patient was explained the risks/benefits/alternatives/expected outcomes of the procedure. The patient was explained the risks of the procedure, including, but not limited to, the risk of reaction to the anesthesia medicine and the risk of perforation requiring further surgery. The patient was informed that they may require biopsy or polypectomy. These procedures may increase the risk of complication. All questions were answered.   The patient verbalized understanding and agreed to proceed.         Qing Marcano MD

## 2022-01-26 NOTE — TELEPHONE ENCOUNTER
Scheduled pt for Colonoscopy 3/10/22 at 11AM. Pt needs to arrive at [address] at [facility]. Patient confirmed date and time, address and directions given in office. Prep instructions given in office, patient understood.     Electronically signed by Yoav Kyle MA on 1/26/22 at 3:08 PM EST

## 2022-01-26 NOTE — LETTER
[facility]) [facility]  [address] [facility] [address]  Phone: [phone]  Fax: [phone]           Noe Lesch, MD      January 26, 2022     Patient: Jazlyn Dubois   MR Number: [id number]   YOB: 1950   Date of Visit: 1/26/2022       Dear Dr. Munoz Arm: Thank you for referring Pili Mercer to me for evaluation/treatment. Below are the relevant portions of my assessment and plan of care. Change in bowel habits  Blood in stool  At risk for colon cancer  heeartburn/indigestion  Hx of H.pylori       Recommend EGD/colonoscopy. The patient was explained the risks/benefits/alternatives/expected outcomes of the procedure. The patient was explained the risks of the procedure, including, but not limited to, the risk of reaction to the anesthesia medicine and the risk of perforation requiring further surgery. The patient was informed that they may require biopsy or polypectomy. These procedures may increase the risk of complication. All questions were answered. The patient verbalized understanding and agreed to proceed. If you have questions, please do not hesitate to call me. I look forward to following Marina Macdonald along with you.     Sincerely,        Noe Lesch, MD    CC providers:  Jerod Reyes DO  [address] [place] [address]  [address] [place]

## 2022-01-31 DIAGNOSIS — E11.9 TYPE 2 DIABETES MELLITUS WITHOUT COMPLICATION, WITHOUT LONG-TERM CURRENT USE OF INSULIN (HCC): ICD-10-CM

## 2022-02-01 RX ORDER — LIRAGLUTIDE 6 MG/ML
1.8 INJECTION SUBCUTANEOUS DAILY
Qty: 3 PEN | Refills: 3 | Status: SHIPPED | OUTPATIENT
Start: 2022-02-01

## 2022-03-07 NOTE — PROGRESS NOTES
[address] PRE-ADMISSION TESTING ENDOSCOPY INSTRUCTIONS- [facility]-phone number:[phone]    ENDOSCOPY INSTRUCTIONS:   [x] Bowel prep instructions reviewed. [x] Nothing by mouth after midnight, including gum, candy, mints, or water. Please follow your surgeons instructions if you are required to complete a bowel prep. Colonoscopy- no solid food-only clear liquids the day prior). [x] You may brush your teeth, gargle, but do NOT swallow water. [x] Do not wear makeup, lotions, powders, deodorant. Nail polish as directed by the nurse. [x] Arrange transportation with a responsible adult  to and from the hospital. If you do not have a responsible adult  to transport you, you will need to make arrangements with a medical transportation company (i.e. [employer]. A Uber/taxi/bus is not appropriate unless you are accompanied by a responsible adult ). Arrange for someone to be with you for the remainder of the day and for 24 hours after your procedure due to having had anesthesia. Who will be your  for transportation?___Mark____   Who will be staying with you for 24 hrs after your procedure?__________________    PARKING INSTRUCTIONS:   [x] Arrival Time:__1000_______  · [x] Parking lot  \"I\" OR 1 is located on [facility] (the corner of [facility]). To enter, press the button and the gate will lift. A free token will be provided to exit the lot. One car per patient is allowed to park in this lot. All other cars are to park on [address] [place] [address] either in the parking garage or the handicap lot. [] To reach the [place] lobby from [address] [place] [address], upon entering the hospital, take elevator B to the 3rd floor. EDUCATION INSTRUCTIONS:  [x] Bring a complete list of your medications, please write the last time you took the medicine, give this list to the nurse.   [x] Take the following medications the morning of surgery with 1-2 ounces of water: Tylenol if needed, atenolol, Protonix  [x] Stop herbal supplements and vitamins 5 days before your surgery. [x] DO NOT take any diabetic medicine the morning of surgery. Follow instructions for insulin the day before surgery. [x] If you are diabetic and your blood sugar is low or you feel symptomatic, you may drink 1-2 ounces of apple juice or take a glucose tablet. The morning of your procedure, you may call the pre-op area if you have concerns about your blood sugar [phone]. [x] Use your inhalers the morning of surgery. Bring your emergency inhaler with you day of surgery. [x] Follow physician instructions regarding any blood thinners you may be taking. WHAT TO EXPECT:  [x] The day of your procedure you will be greeted and checked in by the [employer].  In addition, you will be registered in the [place] by a Patient Access Representative. Please bring your photo ID and insurance card. A nurse will greet you in accordance to the time you are needed in the pre-op area to prepare you for surgery. Please do not be discouraged if you are not greeted in the order you arrive as there are many variables that are involved in patient preparation. Your patience is greatly appreciated as you wait for your nurse. Please bring in items such as: books, magazines, newspapers, electronics, or any other items  to occupy your time in the waiting area. [x]  Delays may occur. Staff will make a sincere effort to keep you informed of delays. If any delays occur with your procedure, we apologize ahead of time for your inconvenience as we recognize the value of your time.

## 2022-03-10 ENCOUNTER — ANESTHESIA EVENT (OUTPATIENT)
Dept: ENDOSCOPY | Age: 72
End: 2022-03-10
Payer: MEDICARE

## 2022-03-10 ENCOUNTER — ANESTHESIA (OUTPATIENT)
Dept: ENDOSCOPY | Age: 72
End: 2022-03-10
Payer: MEDICARE

## 2022-03-10 ENCOUNTER — HOSPITAL ENCOUNTER (OUTPATIENT)
Age: 72
Setting detail: OUTPATIENT SURGERY
Discharge: HOME | End: 2022-03-10
Attending: SURGERY | Admitting: SURGERY
Payer: MEDICARE

## 2022-03-10 VITALS
DIASTOLIC BLOOD PRESSURE: 62 MMHG | SYSTOLIC BLOOD PRESSURE: 133 MMHG | OXYGEN SATURATION: 100 % | RESPIRATION RATE: 9 BRPM

## 2022-03-10 VITALS
OXYGEN SATURATION: 92 % | RESPIRATION RATE: 20 BRPM | HEART RATE: 72 BPM | DIASTOLIC BLOOD PRESSURE: 59 MMHG | WEIGHT: 170 LBS | BODY MASS INDEX: 34.27 KG/M2 | SYSTOLIC BLOOD PRESSURE: 107 MMHG | TEMPERATURE: 97.3 F | HEIGHT: 59 IN

## 2022-03-10 DIAGNOSIS — Z01.812 PRE-OPERATIVE LABORATORY EXAMINATION: Primary | ICD-10-CM

## 2022-03-10 LAB — METER GLUCOSE: 100 MG/DL (ref 74–99)

## 2022-03-10 PROCEDURE — 88305 TISSUE EXAM BY PATHOLOGIST: CPT

## 2022-03-10 PROCEDURE — 3609012400 HC EGD TRANSORAL BIOPSY SINGLE/MULTIPLE: Performed by: SURGERY

## 2022-03-10 PROCEDURE — 2580000003 HC RX 258: Performed by: SURGERY

## 2022-03-10 PROCEDURE — 7100000011 HC PHASE II RECOVERY - ADDTL 15 MIN: Performed by: SURGERY

## 2022-03-10 PROCEDURE — 2500000003 HC RX 250 WO HCPCS

## 2022-03-10 PROCEDURE — 6360000002 HC RX W HCPCS

## 2022-03-10 PROCEDURE — 43239 EGD BIOPSY SINGLE/MULTIPLE: CPT | Performed by: SURGERY

## 2022-03-10 PROCEDURE — 3609010300 HC COLONOSCOPY W/BIOPSY SINGLE/MULTIPLE: Performed by: SURGERY

## 2022-03-10 PROCEDURE — 2709999900 HC NON-CHARGEABLE SUPPLY: Performed by: SURGERY

## 2022-03-10 PROCEDURE — 3700000001 HC ADD 15 MINUTES (ANESTHESIA): Performed by: SURGERY

## 2022-03-10 PROCEDURE — 7100000010 HC PHASE II RECOVERY - FIRST 15 MIN: Performed by: SURGERY

## 2022-03-10 PROCEDURE — 3700000000 HC ANESTHESIA ATTENDED CARE: Performed by: SURGERY

## 2022-03-10 PROCEDURE — 82962 GLUCOSE BLOOD TEST: CPT

## 2022-03-10 PROCEDURE — 88342 IMHCHEM/IMCYTCHM 1ST ANTB: CPT

## 2022-03-10 PROCEDURE — 45380 COLONOSCOPY AND BIOPSY: CPT | Performed by: SURGERY

## 2022-03-10 RX ORDER — SODIUM CHLORIDE 0.9 % (FLUSH) 0.9 %
5-40 SYRINGE (ML) INJECTION PRN
Status: DISCONTINUED | OUTPATIENT
Start: 2022-03-10 | End: 2022-03-10 | Stop reason: HOSPADM

## 2022-03-10 RX ORDER — SODIUM CHLORIDE 9 MG/ML
INJECTION, SOLUTION INTRAVENOUS CONTINUOUS
Status: DISCONTINUED | OUTPATIENT
Start: 2022-03-10 | End: 2022-03-10 | Stop reason: HOSPADM

## 2022-03-10 RX ORDER — SODIUM CHLORIDE 0.9 % (FLUSH) 0.9 %
5-40 SYRINGE (ML) INJECTION EVERY 12 HOURS SCHEDULED
Status: DISCONTINUED | OUTPATIENT
Start: 2022-03-10 | End: 2022-03-10 | Stop reason: HOSPADM

## 2022-03-10 RX ORDER — SODIUM CHLORIDE 9 MG/ML
25 INJECTION, SOLUTION INTRAVENOUS PRN
Status: DISCONTINUED | OUTPATIENT
Start: 2022-03-10 | End: 2022-03-10 | Stop reason: HOSPADM

## 2022-03-10 RX ORDER — GLYCOPYRROLATE 1 MG/5 ML
SYRINGE (ML) INTRAVENOUS PRN
Status: DISCONTINUED | OUTPATIENT
Start: 2022-03-10 | End: 2022-03-10 | Stop reason: SDUPTHER

## 2022-03-10 RX ORDER — PROPOFOL 10 MG/ML
INJECTION, EMULSION INTRAVENOUS PRN
Status: DISCONTINUED | OUTPATIENT
Start: 2022-03-10 | End: 2022-03-10 | Stop reason: SDUPTHER

## 2022-03-10 RX ORDER — SUCRALFATE 1 G/1
1 TABLET ORAL 4 TIMES DAILY
Qty: 120 TABLET | Refills: 3 | Status: SHIPPED | OUTPATIENT
Start: 2022-03-10

## 2022-03-10 RX ADMIN — SODIUM CHLORIDE: 9 INJECTION, SOLUTION INTRAVENOUS at 11:01

## 2022-03-10 RX ADMIN — Medication 0.4 MG: at 11:16

## 2022-03-10 RX ADMIN — PROPOFOL 200 MG: 10 INJECTION, EMULSION INTRAVENOUS at 11:05

## 2022-03-10 ASSESSMENT — PAIN SCALES - GENERAL
PAINLEVEL_OUTOF10: 0
PAINLEVEL_OUTOF10: 0

## 2022-03-10 ASSESSMENT — PAIN - FUNCTIONAL ASSESSMENT: PAIN_FUNCTIONAL_ASSESSMENT: 0-10

## 2022-03-10 ASSESSMENT — LIFESTYLE VARIABLES: SMOKING_STATUS: 0

## 2022-03-10 NOTE — ANESTHESIA PRE PROCEDURE
Department of Anesthesiology  Preprocedure Note       Name:  Yoel Shah   Age:  67 y.o.  :  1950                                          MRN:  [id number]         Date:  3/10/2022      Surgeon: Alejo Jensen):  Heather Moseley MD    Procedure: Procedure(s):  EGD ESOPHAGOGASTRODUODENOSCOPY  COLONOSCOPY DIAGNOSTIC    Medications prior to admission:   Prior to Admission medications    Medication Sig Start Date End Date Taking?  Authorizing Provider   Liraglutide (VICTOZA) 18 MG/3ML SOPN SC injection Inject 1.8 mg into the skin daily 22   Priya Brush DO   clotrimazole-betamethasone (LOTRISONE) 1-0.05 % cream APPLY  CREAM TOPICALLY TWICE DAILY 22   Priya Brush DO   atenolol (TENORMIN) 50 MG tablet Take 1 tablet by mouth daily 21   [place]opolis Ronnie, DO   quinapril (ACCUPRIL) 5 MG tablet TAKE 1 TABLET EVERY NIGHT 21   [place] Ronnie, DO   Insulin Pen Needle (DROPLET PEN NEEDLES) 31G X 6 MM MISC Inject 1 each into the skin 3 times daily 11/3/21   Theopolis Douds, DO   pantoprazole (PROTONIX) 40 MG tablet Take 1 tablet by mouth daily  Patient taking differently: Take 40 mg by mouth in the morning and at bedtime  10/26/21 1/26/22  Theopolis Douds, DO   levothyroxine (SYNTHROID) 100 MCG tablet TAKE 1 TABLET EVERY DAY 10/26/21   Theopolis Douds, DO   simvastatin (ZOCOR) 80 MG tablet TAKE 1 TABLET EVERY NIGHT 10/21/21   Theopolis Douds, DO   Alcohol Swabs (B-D SINGLE USE SWABS REGULAR) PADS Apply 1 each topically 3 times daily USE AS DIRECTED AS NEEDED  TO TEST BLOOD SUGAR 21   Theopolis Douds, DO   ibuprofen (ADVIL;MOTRIN) 800 MG tablet Take 1 tablet by mouth 2 times daily as needed for Pain 21  Tawana Bassett DO   blood glucose test strips (ACCU-CHEK STEVEN PLUS) strip TEST TWO TIMES DAILY 21   Gera Blanco APRN - CNP   venlafaxine (EFFEXOR XR) 150 MG extended release capsule TAKE 1 CAPSULE EVERY DAY  Patient taking differently: at bedtime TAKE 1 CAPSULE EVERY DAY 4/10/21   ROXY Bedoya CNP   Accu-Chek Softclix Lancets MISC TEST TWO TIMES DAILY 2/23/21   ROXY Bedoya CNP   acetaminophen (TYLENOL) 500 MG tablet Take 1 tablet by mouth 4 times daily as needed for Pain 6/12/20   ROXY Bedoya CNP   aspirin 81 MG EC tablet Take 1 tablet by mouth daily LD 12-4-19  Patient taking differently: Take 81 mg by mouth daily  12/16/19   ROXY Bedoya CNP   Blood Glucose Monitoring Suppl (ACCU-CHEK STEVEN PLUS) w/Device KIT CHECK BLOOD SUGAR TWICE DAILY 9/28/18   ROXY Gómez CNP       Current medications:    Current Facility-Administered Medications   Medication Dose Route Frequency Provider Last Rate Last Admin    lidocaine-EPINEPHrine 1 %-1:516430 injection 20 mL  20 mL IntraDERmal Once Tom Espana MD         Current Outpatient Medications   Medication Sig Dispense Refill    Liraglutide (VICTOZA) 18 MG/3ML SOPN SC injection Inject 1.8 mg into the skin daily 3 pen 3    clotrimazole-betamethasone (LOTRISONE) 1-0.05 % cream APPLY  CREAM TOPICALLY TWICE DAILY 45 g 0    atenolol (TENORMIN) 50 MG tablet Take 1 tablet by mouth daily 90 tablet 1    quinapril (ACCUPRIL) 5 MG tablet TAKE 1 TABLET EVERY NIGHT 90 tablet 2    Insulin Pen Needle (DROPLET PEN NEEDLES) 31G X 6 MM MISC Inject 1 each into the skin 3 times daily 100 each 5    pantoprazole (PROTONIX) 40 MG tablet Take 1 tablet by mouth daily (Patient taking differently: Take 40 mg by mouth in the morning and at bedtime ) 90 tablet 1    levothyroxine (SYNTHROID) 100 MCG tablet TAKE 1 TABLET EVERY DAY 90 tablet 1    simvastatin (ZOCOR) 80 MG tablet TAKE 1 TABLET EVERY NIGHT 90 tablet 2    Alcohol Swabs (B-D SINGLE USE SWABS REGULAR) PADS Apply 1 each topically 3 times daily USE AS DIRECTED AS NEEDED  TO TEST BLOOD SUGAR 300 each 5    ibuprofen (ADVIL;MOTRIN) 800 MG tablet Take 1 tablet by mouth 2 times daily as needed for Pain 60 tablet 0    blood glucose test strips (ACCU-CHEK STEVEN PLUS) strip TEST TWO TIMES DAILY 200 strip 5    venlafaxine (EFFEXOR XR) 150 MG extended release capsule TAKE 1 CAPSULE EVERY DAY (Patient taking differently: at bedtime TAKE 1 CAPSULE EVERY DAY) 90 capsule 3    Accu-Chek Softclix Lancets MISC TEST TWO TIMES DAILY 200 each 2    acetaminophen (TYLENOL) 500 MG tablet Take 1 tablet by mouth 4 times daily as needed for Pain 360 tablet 1    aspirin 81 MG EC tablet Take 1 tablet by mouth daily LD 12-4-19 (Patient taking differently: Take 81 mg by mouth daily ) 90 tablet 1    Blood Glucose Monitoring Suppl (ACCU-CHEK STEVEN PLUS) w/Device KIT CHECK BLOOD SUGAR TWICE DAILY 1 kit 0       Allergies:  No Known Allergies    Problem List:    Patient Active Problem List   Diagnosis Code    Hypertension I10    Hyperlipidemia E78.5    Carotid artery stenosis I65.29    Type 2 diabetes mellitus without complication (HCC) Q17.9    Anxiety and depression F41.9, F32. A    Polyarthralgia M25.50    Arthritis of both knees M17.0    Swelling of left hand M79.89    Left hand pain M79.642    Thyroid nodule E04.1    Major depressive disorder, single episode, moderate (HCC) F32.1    Heartburn R12    Indigestion K30    Tear of right rotator cuff M75.101    Rupture of right proximal biceps tendon S46.211A    Gastroduodenitis K29.90    Osteochondropathy M93.90    AAA (abdominal aortic aneurysm) without rupture (HCC) I71.4       Past Medical History:        Diagnosis Date    Acute MI ([facility] 07/11/08    CAD (coronary artery disease)     Dr. Meredith Carotid artery occlusion     Diabetes mellitus ([facility]     Diverticulitis     Hyperlipidemia     Hypertension     Obese     Osteoarthritis     Thyroid nodule        Past Surgical History:        Procedure Laterality Date    CARPAL TUNNEL RELEASE      (L)    CARPAL TUNNEL RELEASE Right 12/13/2019    RIGHT CARPAL TUNNEL RELEASE ENDOSCOPIC performed by Saira Valencia MD at [facility] CATH LAB PROCEDURE      stent placed     FINGER TRIGGER RELEASE Right 12/13/2019    RIGHT THUMB TRIGGER RELEASE performed by Khloe England MD at [facility]  05/2017    LT knee torn meniscus / DR Espinal  2017    THYROIDECTOMY N/A 01/16/2020    TOTAL THYROIDECTOMY--NERVE INTEGRITY MONITOR performed by Celso Sheridan DO at [facility]  07/02/2021    severe gastritis--jose angel    UPPER GASTROINTESTINAL ENDOSCOPY N/A 7/2/2021    EGD BIOPSY performed by Noe Lesch, MD at [facility] History:    Social History     Tobacco Use    Smoking status: Former Smoker     Packs/day: 1.00     Years: 43.00     Pack years: 43.00     Start date: 1968     Quit date: 3/18/2011     Years since quitting: 10.9    Smokeless tobacco: Never Used   Substance Use Topics    Alcohol use: No                                Counseling given: Not Answered      Vital Signs (Current):   Vitals:    03/07/22 1220   Weight: 170 lb (77.1 kg)   Height: 4' 11\" (1.499 m)                                              BP Readings from Last 3 Encounters:   01/26/22 (!) 159/94   01/14/22 126/75   12/21/21 122/70       NPO Status:                                                                                 BMI:   Wt Readings from Last 3 Encounters:   03/07/22 170 lb (77.1 kg)   01/26/22 170 lb (77.1 kg)   01/14/22 170 lb 9.6 oz (77.4 kg)     Body mass index is 34.34 kg/m².     CBC:   Lab Results   Component Value Date    WBC 5.9 01/06/2022    RBC 4.48 01/06/2022    HGB 13.2 01/06/2022    HCT 41.0 01/06/2022    MCV 91.5 01/06/2022    RDW 13.2 01/06/2022     01/06/2022       CMP:   Lab Results   Component Value Date     01/06/2022    K 5.1 01/06/2022    K 4.9 11/27/2021     01/06/2022    CO2 24 01/06/2022    BUN 20 01/06/2022    CREATININE 0.7 01/06/2022    GFRAA >60 01/06/2022    LABGLOM >60 01/06/2022    GLUCOSE 147 01/06/2022    PROT 7.3 01/06/2022    CALCIUM 9.4 01/06/2022    BILITOT 0.3 01/06/2022    ALKPHOS 126 01/06/2022    AST 23 01/06/2022    ALT 27 01/06/2022       POC Tests: No results for input(s): POCGLU, POCNA, POCK, POCCL, POCBUN, POCHEMO, POCHCT in the last 72 hours. Coags: No results found for: PROTIME, INR, APTT    HCG (If Applicable): No results found for: PREGTESTUR, PREGSERUM, HCG, HCGQUANT     ABGs: No results found for: PHART, PO2ART, YJN9SLB, UJW3LWL, BEART, K3KJHSFB     Type & Screen (If Applicable):  No results found for: LABABO, LABRH    Drug/Infectious Status (If Applicable):  No results found for: HIV, HEPCAB    COVID-19 Screening (If Applicable): No results found for: COVID19        Anesthesia Evaluation  Patient summary reviewed and Nursing notes reviewed no history of anesthetic complications:   Airway: Mallampati: III  TM distance: >3 FB   Neck ROM: full  Mouth opening: > = 3 FB Dental:          Pulmonary:Negative Pulmonary ROS   (+) sleep apnea: on noncompliant,  decreased breath sounds,      (-) not a current smoker                           Cardiovascular:  Exercise tolerance: good (>4 METS),   (+) hypertension: mild, past MI: no interval change, CAD (Denies recent anginal symptoms or NTG usage): obstructive, CABG/stent (PTCA with stent in 2008 on monotherapy with ASA):, murmur (Grade I), hyperlipidemia    (-) dysrhythmias,  angina and  CHF    ECG reviewed  Rhythm: regular  Rate: normal           Beta Blocker:  Not on Beta Blocker      ROS comment: EKG:  Sinus  Rhythm   Low voltage in precordial leads. ABNORMAL     Neuro/Psych:   (+) psychiatric history:depression/anxiety    (-) seizures, TIA and CVA           GI/Hepatic/Renal:        (-) hepatitis      ROS comment: Diverticular disease. Endo/Other:    (+) Diabetes (A1C 6.3%)Type II DM, well controlled, , hypothyroidism: arthritis: OA., electrolyte abnormalities (Hyperkalemia (K+ 5.1 mmol/L)), .           Pt had no PAT visit Abdominal:   (+) obese,           Vascular:     - DVT and PE.       ROS comment: Carotid stenosis  AAA. Other Findings:           Anesthesia Plan      MAC     ASA 3       Induction: intravenous. Anesthetic plan and risks discussed with patient. Plan discussed with CRNA.                   Isadora Marin DO   3/10/2022

## 2022-03-10 NOTE — H&P
Patient ID: Yuval Palacios is a 67 y.o. female. HPI  67 yr old female referred by Dr. Stephani Martin for colonoscopy eval.  Pt denies prior colonoscopy. Pt underwent EGD 7/2021 and found to be H.pylori positive. States she took Abx but never had repeat scope. Reports she is not taking the sucralfate but is still using Protonix. Reports ongoing heartburn and indigestion. States she has occasional abd pain--mainly on left side. Occasionally travels to back. .  Reports alternating diarrhea and constipation which is a change in her bowel habits. States she has been incontinent of stool due to extreme diarrhea at times. Reports seeing bright red blood mixed in with stool. Denies unintentional weight loss or family hx of colon cancer.      Past Medical History        Past Medical History:   Diagnosis Date    Acute MI ([facility] 07/11/08    CAD (coronary artery disease)       Dr. Soni Painter Carotid artery occlusion      Diabetes mellitus ([facility]      Diverticulitis      Hyperlipidemia      Hypertension      Obese      Osteoarthritis      Thyroid nodule              Past Surgical History         Past Surgical History:   Procedure Laterality Date    CARPAL TUNNEL RELEASE         (L)    CARPAL TUNNEL RELEASE Right 12/13/2019     RIGHT CARPAL TUNNEL RELEASE ENDOSCOPIC performed by Kelli Blizzard, MD at [address] [facility] CATH LAB PROCEDURE         stent placed    [place] Right 12/13/2019     RIGHT THUMB TRIGGER RELEASE performed by Kelli Blizzard, MD at [address] [place] [address]   05/2017     LT knee torn meniscus / DR Espinal  2017    THYROIDECTOMY N/A 01/16/2020     TOTAL THYROIDECTOMY--NERVE INTEGRITY MONITOR performed by Sharifa Harris DO at [facility]   07/02/2021     severe gastritis--jose angel    UPPER GASTROINTESTINAL ENDOSCOPY N/A 7/2/2021     EGD BIOPSY performed by Juan Thomas MD at [facility] Facility-Administered Medications          Current Outpatient Medications   Medication Sig Dispense Refill    clotrimazole-betamethasone (LOTRISONE) 1-0.05 % cream APPLY  CREAM TOPICALLY TWICE DAILY 45 g 0    atenolol (TENORMIN) 50 MG tablet Take 1 tablet by mouth daily 90 tablet 1    quinapril (ACCUPRIL) 5 MG tablet TAKE 1 TABLET EVERY NIGHT 90 tablet 2    Insulin Pen Needle (DROPLET PEN NEEDLES) 31G X 6 MM MISC Inject 1 each into the skin 3 times daily 100 each 5    pantoprazole (PROTONIX) 40 MG tablet Take 1 tablet by mouth daily 90 tablet 1    levothyroxine (SYNTHROID) 100 MCG tablet TAKE 1 TABLET EVERY DAY 90 tablet 1    simvastatin (ZOCOR) 80 MG tablet TAKE 1 TABLET EVERY NIGHT 90 tablet 2    Alcohol Swabs (B-D SINGLE USE SWABS REGULAR) PADS Apply 1 each topically 3 times daily USE AS DIRECTED AS NEEDED  TO TEST BLOOD SUGAR 300 each 5    ibuprofen (ADVIL;MOTRIN) 800 MG tablet Take 1 tablet by mouth 2 times daily as needed for Pain 60 tablet 0    blood glucose test strips (ACCU-CHEK STEVEN PLUS) strip TEST TWO TIMES DAILY 200 strip 5    venlafaxine (EFFEXOR XR) 150 MG extended release capsule TAKE 1 CAPSULE EVERY DAY 90 capsule 3    Accu-Chek Softclix Lancets MISC TEST TWO TIMES DAILY 200 each 2    acetaminophen (TYLENOL) 500 MG tablet Take 1 tablet by mouth 4 times daily as needed for Pain 360 tablet 1    aspirin 81 MG EC tablet Take 1 tablet by mouth daily LD 12-4-19 (Patient taking differently: Take 81 mg by mouth daily ) 90 tablet 1    Blood Glucose Monitoring Suppl (ACCU-CHEK STEVEN PLUS) w/Device KIT CHECK BLOOD SUGAR TWICE DAILY 1 kit 0      Current Facility-Administered Medications   Medication Dose Route Frequency Provider Last Rate Last Admin    lidocaine-EPINEPHrine 1 %-1:342247 injection 20 mL  20 mL IntraDERmal Once Margaret Jensen MD                No Known Allergies     Family History         Family History   Problem Relation Age of Onset    Heart Disease Mother      Heart Not on file    Physically Abused: Not on file    Sexually Abused: Not on file   Housing Stability:     Unable to Pay for Housing in the Last Year: Not on file    Number of Places Lived in the Last Year: Not on file    Unstable Housing in the Last Year: Not on file         Review of Systems   Constitutional: Negative for activity change, appetite change, chills, fever and unexpected weight change. HENT: Negative. Eyes: Negative. Respiratory: Negative for cough, choking, chest tightness, shortness of breath and wheezing. Cardiovascular: Negative for chest pain, palpitations and leg swelling. Gastrointestinal: Positive for abdominal pain, blood in stool, constipation and diarrhea. Negative for abdominal distention, anal bleeding, nausea and vomiting. Endocrine: Positive for heat intolerance. Negative for cold intolerance, polydipsia and polyuria. Genitourinary: Negative for dysuria, frequency, hematuria, urgency, vaginal bleeding, vaginal discharge and vaginal pain. Musculoskeletal: Positive for back pain, gait problem and joint swelling. Negative for arthralgias, myalgias, neck pain and neck stiffness. Skin: Negative for color change, pallor, rash and wound. Allergic/Immunologic: Negative for environmental allergies and food allergies. Neurological: Negative for dizziness, seizures, syncope, weakness, light-headedness, numbness and headaches. Hematological: Negative for adenopathy. Does not bruise/bleed easily. Psychiatric/Behavioral: Negative for agitation, confusion, decreased concentration, hallucinations, self-injury and suicidal ideas. The patient is not nervous/anxious and is not hyperactive. Objective:   Physical Exam  Constitutional:       General: She is not in acute distress. Appearance: Normal appearance. She is obese. She is not ill-appearing, toxic-appearing or diaphoretic. HENT:      Head: Normocephalic and atraumatic.       Nose: Nose normal. Mouth/Throat:      Mouth: Mucous membranes are moist.      Pharynx: Oropharynx is clear. Eyes:      General: No scleral icterus. Right eye: No discharge. Left eye: No discharge. Extraocular Movements: Extraocular movements intact. Pupils: Pupils are equal, round, and reactive to light. Cardiovascular:      Rate and Rhythm: Normal rate and regular rhythm. Heart sounds: Normal heart sounds. No murmur heard. Pulmonary:      Effort: Pulmonary effort is normal. No respiratory distress. Breath sounds: Normal breath sounds. No stridor. No wheezing, rhonchi or rales. Abdominal:      General: Bowel sounds are normal. There is no distension. Palpations: Abdomen is soft. There is no mass. Tenderness: There is no abdominal tenderness. There is no guarding or rebound. Hernia: No hernia is present. Musculoskeletal:         General: Normal range of motion. Cervical back: Normal range of motion and neck supple. Skin:     General: Skin is warm and dry. Neurological:      General: No focal deficit present. Mental Status: She is alert and oriented to person, place, and time. Psychiatric:         Mood and Affect: Mood normal.         Behavior: Behavior normal.         Thought Content: Thought content normal.         Judgment: Judgment normal.         Dr. Steinberg Po notes from 1/14/2022 personally reviewed by me. CT scan from 11/27/21 personally reviewed--sigmoid diverticula  Labs from 1/6/22 personally reviewed--no anemia; no leukocystosis     Assessment:   Change in bowel habits  Blood in stool  At risk for colon cancer  heeartburn/indigestion  Hx of H.pylori                    Plan:   Recommend EGD/colonoscopy. The patient was explained the risks/benefits/alternatives/expected outcomes of the procedure.   The patient was explained the risks of the procedure, including, but not limited to, the risk of reaction to the anesthesia medicine and the risk of perforation requiring further surgery. The patient was informed that they may require biopsy or polypectomy. These procedures may increase the risk of complication. All questions were answered. The patient verbalized understanding and agreed to proceed.                    Jerrell Farfan MD      UPDATED 3/10/22  History and physical unchanged  For EGD/colonoscopy    Jerrell Farfan MD, FACS  3/10/2022  10:52 AM

## 2022-03-10 NOTE — OP NOTE
Operative Note      Patient: Yuval Palacios  YOB: 1950  MRN: [id number]    Date of Procedure: 3/10/2022    Pre-Op Diagnosis: CHANGE IN BOWEL HABITS, HISTORY OF HELICOBACTER PYLORIINFECTION    Post-Op Diagnosis: Same and gastritis; hiatal hernia; diverticula starting at 40 cm; 3 mm polyp at 18 cm       Procedure(s):  EGD BIOPSY  COLONOSCOPY WITH BIOPSY    Surgeon(s):  Juan Thomas MD    Assistant:   * No surgical staff found *    Anesthesia: Monitor Anesthesia Care    Estimated Blood Loss (mL): Minimal    Complications: None    Specimens:   ID Type Source Tests Collected by Time Destination   A : EGS STOMACH ANTRUM BIOPSY R/O H PYLORI Tissue Stomach SURGICAL PATHOLOGY Juan Thomas MD 3/10/2022 1114    B : COLON. POLYP AT 18 CM FROM RECTUM Tissue Colon SURGICAL PATHOLOGY Juan Thomas MD 3/10/2022 1125        Implants:  * No implants in log *      Drains:   [REMOVED] Closed/Suction Drain Anterior Neck Bulb 15 [place] (Removed)       Findings: gastritis; hiatal hernia; diverticula starting at 40 cm; 3 mm polyp at 18 cm    Detailed Description of Procedure:   ESOPHAGOGASTRODUODENOSCOPY PROCEDURE NOTE  PROCEDURE:  The patient was brought into the endoscopy suite and placed in the left lateral decubitus position. A bite block was placed in the patients mouth. After the initiation of LMAC anesthesia, a gastroscope was inserted into the patient's mouth and passed into the esophagus and into the stomach. Immediately upon entering the stomach the note of gastritis was made. The scope was advanced through the pylorus into the first and second portion of the duodenum making the note of normal duodenum. The scope was then withdrawn back into the stomach where it was retroflexed. There was a small hiatal hernia noted. The scope was then straightened and antral biopsies were taken.  The scope was then withdrawn the entire length of the esophagus, making the note of a normal esophagus without masses, ulcerations, or lesions noted. The scope was withdrawn entirely. The patient tolerated the procedure well and there were no complications. COLONOSCOPY PROCEDURE NOTE  PROCEDURE:  The patient was brought into the endoscopy suite and placed in the left lateral decubitus position. A digital rectal exam was performed after the initiation of LMAC anesthesia and failed to reveal any obstructing masses or lesions. A colonoscope was inserted into the patient's anus and passed through the rectum, sigmoid, descending, transverse, and ascending colon all the way to the level of the cecum. Visualization of the cecum was confirmed by visualization of the ileo-cecal valve and confluence of the tinea. The scope was then withdrawn the entire length of the colon. There were no masses, polyps, or lesions noted until reaching 40 cm where scattered diverticula were seen. At 18 cm, a 3 mm polyp was seen and biopsied. Upon reaching the anus, the scope was retroflexed. There were no significant hemorrhoids noted. The scope was straightened and withdrawn entirely. The patient tolerated the procedure well and there were no complications. Prep was adequate; repeat colonoscopy in 3 years pending pathology.       Paul Schaeffer MD, MD  3/10/2022        Electronically signed by Paul Schaeffer MD on 3/10/2022 at 11:29 AM

## 2022-03-10 NOTE — PROGRESS NOTES
Discharge instructions given to pt and , verbalized understanding  Discharged in stable condition via wheelchair  Painfree, ju po well, abdomen soft to palp

## 2022-03-10 NOTE — ANESTHESIA POSTPROCEDURE EVALUATION
Department of Anesthesiology  Postprocedure Note    Patient: Yoel Shah  MRN: [id number]  YOB: 1950  Date of evaluation: 3/10/2022  Time:  12:34 PM     Procedure Summary     Date: 03/10/22 Room / Location: [address] [place], [place] [address] / [facility]    Anesthesia Start: 1104 Anesthesia Stop: 7376    Procedures:       EGD BIOPSY (N/A )      COLONOSCOPY WITH BIOPSY (N/A ) Diagnosis: (CHANGE IN BOWEL HABITS, HISTORY OF HELICOBACTER PYLORIINFECTION)    Surgeons: Heather Moseley MD Responsible Provider: Carlos Freitas DO    Anesthesia Type: MAC ASA Status: 3          Anesthesia Type: MAC    Win Phase I: Win Score: 10    Win Phase II: Win Score: 10    Last vitals: Reviewed and per EMR flowsheets.        Anesthesia Post Evaluation    Patient location during evaluation: bedside  Patient participation: complete - patient participated  Level of consciousness: awake  Pain score: 0  Airway patency: patent  Nausea & Vomiting: no vomiting and no nausea  Complications: no  Cardiovascular status: hemodynamically stable  Respiratory status: acceptable  Hydration status: stable

## 2022-03-15 ENCOUNTER — TELEPHONE (OUTPATIENT)
Dept: SURGERY | Age: 72
End: 2022-03-15

## 2022-03-15 NOTE — TELEPHONE ENCOUNTER
MA received a call from pt questioning if there is another medication she could take beside sucralfate that was prescribed by Dr. Ja Cardenas. Pt states she is a diabetic, and with the way she eats she cannot stay on schedule with the sucralfate. Routing to Dr. Ja Cardenas for further advisement.     Electronically signed by Destini Mena MA on 3/15/2022 at 10:50 AM

## 2022-03-15 NOTE — TELEPHONE ENCOUNTER
MA returned pt call to inform of Dr. Faisal Mireles advisement \"She should try to take as often as possible to help her stomach. Brittanie Lr is already on PPI as well. \" Pt verbalized understanding, and thanked MA.     Electronically signed by Mango Hilton MA on 3/15/2022 at 3:35 PM

## 2022-03-23 DIAGNOSIS — E04.1 THYROID NODULE: ICD-10-CM

## 2022-03-23 DIAGNOSIS — K21.9 GASTROESOPHAGEAL REFLUX DISEASE WITHOUT ESOPHAGITIS: ICD-10-CM

## 2022-03-24 RX ORDER — PANTOPRAZOLE SODIUM 40 MG/1
TABLET, DELAYED RELEASE ORAL
Qty: 90 TABLET | Refills: 1 | Status: SHIPPED
Start: 2022-03-24 | End: 2022-05-24 | Stop reason: SDUPTHER

## 2022-03-24 RX ORDER — LEVOTHYROXINE SODIUM 0.1 MG/1
TABLET ORAL
Qty: 90 TABLET | Refills: 1 | Status: SHIPPED
Start: 2022-03-24 | End: 2022-05-24 | Stop reason: SDUPTHER

## 2022-04-06 DIAGNOSIS — E11.9 TYPE 2 DIABETES MELLITUS WITHOUT COMPLICATION, WITHOUT LONG-TERM CURRENT USE OF INSULIN (HCC): ICD-10-CM

## 2022-04-07 RX ORDER — PEN NEEDLE, DIABETIC 31 G X1/4"
NEEDLE, DISPOSABLE MISCELLANEOUS
Qty: 300 EACH | Refills: 3 | Status: SHIPPED | OUTPATIENT
Start: 2022-04-07

## 2022-04-19 ENCOUNTER — TELEPHONE (OUTPATIENT)
Dept: FAMILY MEDICINE CLINIC | Age: 72
End: 2022-04-19

## 2022-04-19 NOTE — TELEPHONE ENCOUNTER
----- Message from [facility] sent at 4/19/2022 12:57 PM EDT -----  Subject: Message to Provider    QUESTIONS  Information for Provider? Patient got a letter in the mail that [facility] is leaving. She said this will be the fourth time she will have to   change doctors. She would like someone in the office to call her to help   her figure out what doctor she should schedule with. Call patient back. ---------------------------------------------------------------------------  --------------  Trey BUCK  What is the best way for the office to contact you? OK to leave message on   voicemail  Preferred Call Back Phone Number? [phone]  ---------------------------------------------------------------------------  --------------  SCRIPT ANSWERS  Relationship to Patient?  Self

## 2022-05-04 ENCOUNTER — TELEPHONE (OUTPATIENT)
Dept: FAMILY MEDICINE CLINIC | Age: 72
End: 2022-05-04

## 2022-05-04 DIAGNOSIS — E11.9 TYPE 2 DIABETES MELLITUS WITHOUT COMPLICATION, WITHOUT LONG-TERM CURRENT USE OF INSULIN (HCC): ICD-10-CM

## 2022-05-04 DIAGNOSIS — I10 ESSENTIAL HYPERTENSION: ICD-10-CM

## 2022-05-04 RX ORDER — LIRAGLUTIDE 6 MG/ML
1.8 INJECTION SUBCUTANEOUS DAILY
Qty: 3 PEN | Refills: 3 | Status: CANCELLED | OUTPATIENT
Start: 2022-05-04

## 2022-05-04 RX ORDER — ATENOLOL 50 MG/1
TABLET ORAL
Qty: 90 TABLET | Refills: 1 | Status: SHIPPED
Start: 2022-05-04 | End: 2022-05-24 | Stop reason: SDUPTHER

## 2022-05-04 NOTE — TELEPHONE ENCOUNTER
Liraglutide (VICTOZA) 18 MG/3ML SOPN SC injection    Pt called in [employer] needed updated info in ordere to refill prescription. I called [employer] to update office info. medication will be delivered in 10to 14 business days

## 2022-05-05 RX ORDER — BLOOD SUGAR DIAGNOSTIC
STRIP MISCELLANEOUS
Qty: 200 STRIP | Refills: 5 | Status: SHIPPED | OUTPATIENT
Start: 2022-05-05

## 2022-05-05 NOTE — TELEPHONE ENCOUNTER
Last Appointment:  9/30/2021  Future Appointments   Date Time Provider Radha Johnsoni   5/24/2022  2:00 PM [facility]   4/25/2023 11:00 AM Ayse Ortega MD [facility]

## 2022-05-24 ENCOUNTER — OFFICE VISIT (OUTPATIENT)
Dept: FAMILY MEDICINE CLINIC | Age: 72
End: 2022-05-24
Payer: MEDICARE

## 2022-05-24 ENCOUNTER — TELEPHONE (OUTPATIENT)
Dept: FAMILY MEDICINE CLINIC | Age: 72
End: 2022-05-24

## 2022-05-24 VITALS
BODY MASS INDEX: 34.88 KG/M2 | RESPIRATION RATE: 20 BRPM | HEART RATE: 71 BPM | WEIGHT: 173 LBS | HEIGHT: 59 IN | TEMPERATURE: 98.1 F | SYSTOLIC BLOOD PRESSURE: 138 MMHG | DIASTOLIC BLOOD PRESSURE: 80 MMHG

## 2022-05-24 DIAGNOSIS — F41.9 ANXIETY AND DEPRESSION: ICD-10-CM

## 2022-05-24 DIAGNOSIS — I10 ESSENTIAL HYPERTENSION: ICD-10-CM

## 2022-05-24 DIAGNOSIS — K21.9 GASTROESOPHAGEAL REFLUX DISEASE WITHOUT ESOPHAGITIS: ICD-10-CM

## 2022-05-24 DIAGNOSIS — Z12.2 SCREENING FOR LUNG CANCER: ICD-10-CM

## 2022-05-24 DIAGNOSIS — E11.9 TYPE 2 DIABETES MELLITUS WITHOUT COMPLICATION, WITHOUT LONG-TERM CURRENT USE OF INSULIN (HCC): ICD-10-CM

## 2022-05-24 DIAGNOSIS — Z87.891 PERSONAL HISTORY OF TOBACCO USE: ICD-10-CM

## 2022-05-24 DIAGNOSIS — Z91.81 AT HIGH RISK FOR FALLS: Primary | ICD-10-CM

## 2022-05-24 DIAGNOSIS — F32.A ANXIETY AND DEPRESSION: ICD-10-CM

## 2022-05-24 DIAGNOSIS — E04.1 THYROID NODULE: ICD-10-CM

## 2022-05-24 LAB
CREATININE URINE POCT: NORMAL
HBA1C MFR BLD: 6.1 %
MICROALBUMIN/CREAT 24H UR: NORMAL MG/G{CREAT}
MICROALBUMIN/CREAT UR-RTO: NORMAL

## 2022-05-24 PROCEDURE — 82044 UR ALBUMIN SEMIQUANTITATIVE: CPT | Performed by: STUDENT IN AN ORGANIZED HEALTH CARE EDUCATION/TRAINING PROGRAM

## 2022-05-24 PROCEDURE — 83036 HEMOGLOBIN GLYCOSYLATED A1C: CPT | Performed by: STUDENT IN AN ORGANIZED HEALTH CARE EDUCATION/TRAINING PROGRAM

## 2022-05-24 PROCEDURE — 3017F COLORECTAL CA SCREEN DOC REV: CPT | Performed by: STUDENT IN AN ORGANIZED HEALTH CARE EDUCATION/TRAINING PROGRAM

## 2022-05-24 PROCEDURE — G8399 PT W/DXA RESULTS DOCUMENT: HCPCS | Performed by: STUDENT IN AN ORGANIZED HEALTH CARE EDUCATION/TRAINING PROGRAM

## 2022-05-24 PROCEDURE — 99214 OFFICE O/P EST MOD 30 MIN: CPT | Performed by: STUDENT IN AN ORGANIZED HEALTH CARE EDUCATION/TRAINING PROGRAM

## 2022-05-24 PROCEDURE — 1090F PRES/ABSN URINE INCON ASSESS: CPT | Performed by: STUDENT IN AN ORGANIZED HEALTH CARE EDUCATION/TRAINING PROGRAM

## 2022-05-24 PROCEDURE — 1123F ACP DISCUSS/DSCN MKR DOCD: CPT | Performed by: STUDENT IN AN ORGANIZED HEALTH CARE EDUCATION/TRAINING PROGRAM

## 2022-05-24 PROCEDURE — 3044F HG A1C LEVEL LT 7.0%: CPT | Performed by: STUDENT IN AN ORGANIZED HEALTH CARE EDUCATION/TRAINING PROGRAM

## 2022-05-24 PROCEDURE — 1036F TOBACCO NON-USER: CPT | Performed by: STUDENT IN AN ORGANIZED HEALTH CARE EDUCATION/TRAINING PROGRAM

## 2022-05-24 PROCEDURE — G8417 CALC BMI ABV UP PARAM F/U: HCPCS | Performed by: STUDENT IN AN ORGANIZED HEALTH CARE EDUCATION/TRAINING PROGRAM

## 2022-05-24 PROCEDURE — G0296 VISIT TO DETERM LDCT ELIG: HCPCS | Performed by: STUDENT IN AN ORGANIZED HEALTH CARE EDUCATION/TRAINING PROGRAM

## 2022-05-24 PROCEDURE — G8427 DOCREV CUR MEDS BY ELIG CLIN: HCPCS | Performed by: STUDENT IN AN ORGANIZED HEALTH CARE EDUCATION/TRAINING PROGRAM

## 2022-05-24 PROCEDURE — 2022F DILAT RTA XM EVC RTNOPTHY: CPT | Performed by: STUDENT IN AN ORGANIZED HEALTH CARE EDUCATION/TRAINING PROGRAM

## 2022-05-24 RX ORDER — ATENOLOL 50 MG/1
50 TABLET ORAL DAILY
Qty: 90 TABLET | Refills: 3 | Status: SHIPPED | OUTPATIENT
Start: 2022-05-24

## 2022-05-24 RX ORDER — VENLAFAXINE HYDROCHLORIDE 150 MG/1
150 CAPSULE, EXTENDED RELEASE ORAL NIGHTLY
Qty: 90 CAPSULE | Refills: 3 | Status: SHIPPED | OUTPATIENT
Start: 2022-05-24

## 2022-05-24 RX ORDER — BLOOD SUGAR DIAGNOSTIC
STRIP MISCELLANEOUS
Qty: 200 STRIP | Refills: 5 | Status: CANCELLED | OUTPATIENT
Start: 2022-05-24

## 2022-05-24 RX ORDER — PANTOPRAZOLE SODIUM 40 MG/1
40 TABLET, DELAYED RELEASE ORAL 2 TIMES DAILY
Qty: 180 TABLET | Refills: 3 | Status: SHIPPED | OUTPATIENT
Start: 2022-05-24

## 2022-05-24 RX ORDER — SIMVASTATIN 80 MG
80 TABLET ORAL NIGHTLY
Qty: 90 TABLET | Refills: 3 | Status: SHIPPED
Start: 2022-05-24 | End: 2022-06-21

## 2022-05-24 RX ORDER — QUINAPRIL 5 1/1
TABLET ORAL
Qty: 90 TABLET | Refills: 3 | Status: SHIPPED | OUTPATIENT
Start: 2022-05-24

## 2022-05-24 RX ORDER — LANCETS
EACH MISCELLANEOUS
Qty: 200 EACH | Refills: 5 | Status: CANCELLED | OUTPATIENT
Start: 2022-05-24

## 2022-05-24 RX ORDER — ISOPROPYL ALCOHOL 0.75 G/1
1 SWAB TOPICAL 3 TIMES DAILY
Qty: 300 EACH | Refills: 5 | Status: CANCELLED | OUTPATIENT
Start: 2022-05-24

## 2022-05-24 RX ORDER — LEVOTHYROXINE SODIUM 0.1 MG/1
100 TABLET ORAL DAILY
Qty: 90 TABLET | Refills: 3 | Status: SHIPPED | OUTPATIENT
Start: 2022-05-24

## 2022-05-24 RX ORDER — ASPIRIN 81 MG/1
81 TABLET ORAL DAILY
Qty: 90 TABLET | Refills: 1 | Status: CANCELLED | OUTPATIENT
Start: 2022-05-24

## 2022-05-24 RX ORDER — PEN NEEDLE, DIABETIC 31 G X1/4"
1 NEEDLE, DISPOSABLE MISCELLANEOUS 2 TIMES DAILY
Qty: 300 EACH | Refills: 5 | Status: CANCELLED | OUTPATIENT
Start: 2022-05-24

## 2022-05-24 ASSESSMENT — ENCOUNTER SYMPTOMS
DIARRHEA: 0
CONSTIPATION: 0
NAUSEA: 0
ABDOMINAL PAIN: 0
SHORTNESS OF BREATH: 0
WHEEZING: 0
BLOOD IN STOOL: 0
COUGH: 0

## 2022-05-24 ASSESSMENT — PATIENT HEALTH QUESTIONNAIRE - PHQ9
6. FEELING BAD ABOUT YOURSELF - OR THAT YOU ARE A FAILURE OR HAVE LET YOURSELF OR YOUR FAMILY DOWN: 0
3. TROUBLE FALLING OR STAYING ASLEEP: 0
SUM OF ALL RESPONSES TO PHQ QUESTIONS 1-9: 0
4. FEELING TIRED OR HAVING LITTLE ENERGY: 0
SUM OF ALL RESPONSES TO PHQ QUESTIONS 1-9: 0
2. FEELING DOWN, DEPRESSED OR HOPELESS: 0
1. LITTLE INTEREST OR PLEASURE IN DOING THINGS: 0
8. MOVING OR SPEAKING SO SLOWLY THAT OTHER PEOPLE COULD HAVE NOTICED. OR THE OPPOSITE, BEING SO FIGETY OR RESTLESS THAT YOU HAVE BEEN MOVING AROUND A LOT MORE THAN USUAL: 0
SUM OF ALL RESPONSES TO PHQ QUESTIONS 1-9: 0
10. IF YOU CHECKED OFF ANY PROBLEMS, HOW DIFFICULT HAVE THESE PROBLEMS MADE IT FOR YOU TO DO YOUR WORK, TAKE CARE OF THINGS AT HOME, OR GET ALONG WITH OTHER PEOPLE: 0
7. TROUBLE CONCENTRATING ON THINGS, SUCH AS READING THE NEWSPAPER OR WATCHING TELEVISION: 0
SUM OF ALL RESPONSES TO PHQ QUESTIONS 1-9: 0
9. THOUGHTS THAT YOU WOULD BE BETTER OFF DEAD, OR OF HURTING YOURSELF: 0
5. POOR APPETITE OR OVEREATING: 0
SUM OF ALL RESPONSES TO PHQ9 QUESTIONS 1 & 2: 0

## 2022-05-24 NOTE — PROGRESS NOTES
Sofiya Zavala (:  1950) is a 67 y.o. female, New patient , Established at the office, here for evaluation of the following:  Establish Care and Medication Refill         ASSESSMENT/PLAN  Patient here to establish care, diabetes under well controlled, hypertension well controlled, does have some arthritic pain, would like to hold off for now on any further imaging or physical therapy, she does use Voltaren gel as well as another topical cream, avoids NSAIDs with stents, overall doing well, follow-up in 6 months    1. Type 2 diabetes mellitus without complication, without long-term current use of insulin (HCC)  Chronic, well controlled, she can stop checking her blood sugars as we can just monitor A1c, continue with the Victoza as she gets this for free from the supplier no microalbuminuria, foot exam normal  -     POCT glycosylated hemoglobin (Hb A1C)  -     simvastatin (ZOCOR) 80 MG tablet; Take 1 tablet by mouth nightly, Disp-90 tablet, R-3Normal  -     POCT microalbumin  -     Diabetic Foot Exam  2. Thyroid nodule  Chronic, well controlled, continue current medications and treatment plan    -     levothyroxine (SYNTHROID) 100 MCG tablet; Take 1 tablet by mouth Daily, Disp-90 tablet, R-3Normal  3. Gastroesophageal reflux disease without esophagitis  Chronic, well controlled, continue current medications and treatment plan, did follow with GI, was positive for H. pylori at one time, last endoscopy was negative    -     pantoprazole (PROTONIX) 40 MG tablet; Take 1 tablet by mouth in the morning and at bedtime, Disp-180 tablet, R-3Normal  4. Essential hypertension  Chronic, well controlled, continue current medications and treatment plan, mild hyperkalemia which may be due to a quinapril however was normal at last check, we will just continue to monitor    -     atenolol (TENORMIN) 50 MG tablet;  Take 1 tablet by mouth daily, Disp-90 tablet, R-3Normal  -     quinapril (ACCUPRIL) 5 MG tablet; TAKE 1 TABLET EVERY NIGHT, Disp-90 tablet, R-3Normal  5. Anxiety and depression  Chronic, well controlled, continue current medications and treatment plan    -     venlafaxine (EFFEXOR XR) 150 MG extended release capsule; Take 1 capsule by mouth at bedtime TAKE 1 CAPSULE EVERY DAY, Disp-90 capsule, R-3Normal  6. Screening for lung cancer  Chronic, slight smoking about 9 years ago, did have lung cancer screening last year, this is her annual screening  -     MS VISIT TO DISCUSS LUNG CA SCREEN W LDCT  -     CT Lung Screen (Annual); Future  7. Personal history of tobacco use  -     MS VISIT TO DISCUSS LUNG CA SCREEN W LDCT  -     CT Lung Screen (Annual); Future    Return in about 6 months (around 11/24/2022) for AWV. Subjective   SUBJECTIVE/OBJECTIVE:  HPI  Patient is here to establish care    Heart attack in 09, was going to cardiology, wants to stop. She has a stent. She sees vascular surgeon, Sees Dr Radha Richards    She had colonoscopy and EGD, had h. pylor now gone, still on omeprazol    She has left hip and knee pain    She had right shoulder injury, no rotator cuff, has been through PT     Declined preventative screening identified as care gaps unless ordered through this visit    PHQ2/PHQ9  PHQ-2 Score: 0  PHQ-2 Over the past 2 weeks, how often have you been bothered by any of the following problems? Little interest or pleasure in doing things: Not at all  Feeling down, depressed, or hopeless: Not at all  PHQ-2 Score: 0   PHQ-9 Total Score: 0 (5/24/2022  2:02 PM)  Thoughts that you would be better off dead, or of hurting yourself in some way: 0 (5/24/2022  2:02 PM)       Past Medical History:  has a past medical history of Acute MI ([address] CAD (coronary artery disease), Carotid artery occlusion, Diabetes mellitus ([address] Diverticulitis, Hyperlipidemia, Hypertension, Obese, Osteoarthritis, and Thyroid nodule.    Past Surgical History:  has a past surgical history that includes Diagnostic Cardiac Cath Lab Procedure; Carpal tunnel release; Hysterectomy; knee surgery (05/2017); Carpal tunnel release (Right, 12/13/2019); Finger trigger release (Right, 12/13/2019); Thyroidectomy (N/A, 01/16/2020); Upper gastrointestinal endoscopy (07/02/2021); Upper gastrointestinal endoscopy (N/A, 07/02/2021); Upper gastrointestinal endoscopy (03/10/2022); Colonoscopy (03/10/2022); Upper gastrointestinal endoscopy (N/A, 3/10/2022); and Colonoscopy (N/A, 3/10/2022). Social History:  reports that she quit smoking about 11 years ago. She started smoking about 54 years ago. She has a 43.00 pack-year smoking history. She has never used smokeless tobacco. She reports that she does not drink alcohol and does not use drugs. Family History: family history includes Diabetes in her father and paternal grandmother; Heart Disease in her father, maternal grandmother, mother, paternal grandmother, and sister. Allergies: Patient has no known allergies.   Medications:   Current Outpatient Medications   Medication Sig Dispense Refill    levothyroxine (SYNTHROID) 100 MCG tablet Take 1 tablet by mouth Daily 90 tablet 3    pantoprazole (PROTONIX) 40 MG tablet Take 1 tablet by mouth in the morning and at bedtime 180 tablet 3    atenolol (TENORMIN) 50 MG tablet Take 1 tablet by mouth daily 90 tablet 3    venlafaxine (EFFEXOR XR) 150 MG extended release capsule Take 1 capsule by mouth at bedtime TAKE 1 CAPSULE EVERY DAY 90 capsule 3    simvastatin (ZOCOR) 80 MG tablet Take 1 tablet by mouth nightly 90 tablet 3    quinapril (ACCUPRIL) 5 MG tablet TAKE 1 TABLET EVERY NIGHT 90 tablet 3    blood glucose test strips (ACCU-CHEK GUIDE) strip TEST BLOOD SUGAR TWICE DAILY 200 strip 5    DROPLET PEN NEEDLES 31G X 6 MM MISC USE THREE TIMES DAILY 300 each 3    sucralfate (CARAFATE) 1 GM tablet Take 1 tablet by mouth 4 times daily 120 tablet 3    Liraglutide (VICTOZA) 18 MG/3ML SOPN SC injection Inject 1.8 mg into the skin daily 3 pen 3    clotrimazole-betamethasone (LOTRISONE) 1-0.05 % cream APPLY  CREAM TOPICALLY TWICE DAILY 45 g 0    Alcohol Swabs (B-D SINGLE USE SWABS REGULAR) PADS Apply 1 each topically 3 times daily USE AS DIRECTED AS NEEDED  TO TEST BLOOD SUGAR 300 each 5    Accu-Chek Softclix Lancets MISC TEST TWO TIMES DAILY 200 each 2    acetaminophen (TYLENOL) 500 MG tablet Take 1 tablet by mouth 4 times daily as needed for Pain 360 tablet 1    aspirin 81 MG EC tablet Take 1 tablet by mouth daily LD 12-4-19 (Patient taking differently: Take 81 mg by mouth daily ) 90 tablet 1    Blood Glucose Monitoring Suppl (ACCU-CHEK STEVEN PLUS) w/Device KIT CHECK BLOOD SUGAR TWICE DAILY 1 kit 0    ibuprofen (ADVIL;MOTRIN) 800 MG tablet Take 1 tablet by mouth 2 times daily as needed for Pain 60 tablet 0     Current Facility-Administered Medications   Medication Dose Route Frequency Provider Last Rate Last Admin    lidocaine-EPINEPHrine 1 %[id number] injection 20 mL  20 mL IntraDERmal Once Saroj Perkins MD          Allergies: Patient has no known allergies. Review of Systems   Constitutional: Negative for chills, fatigue, fever and unexpected weight change. HENT: Negative for hearing loss. Eyes: Negative for visual disturbance. Respiratory: Negative for cough, shortness of breath and wheezing. Cardiovascular: Negative for chest pain, palpitations and leg swelling. Gastrointestinal: Negative for abdominal pain, blood in stool, constipation, diarrhea and nausea. Genitourinary: Negative for dysuria. Musculoskeletal: Negative for arthralgias. Neurological: Negative for weakness, light-headedness, numbness and headaches. Psychiatric/Behavioral: Negative for dysphoric mood and sleep disturbance. The patient is not nervous/anxious. All other systems reviewed and are negative.          Objective   /80   Pulse 71   Temp 98.1 °F (36.7 °C) (Temporal)   Resp 20   Ht 4' 11\" (1.499 m)   Wt 173 lb (78.5 kg)   BMI 34.94 kg/m²       Lab Results   Component Value Date    LABA1C 6.1 05/24/2022    LABA1C 6.3 (H) 01/06/2022    LABA1C 5.9 09/09/2021     Lab Results   Component Value Date    CHOL 166 01/06/2022    CHOL 170 01/20/2021    CHOL 140 11/29/2018     Lab Results   Component Value Date    TRIG 175 (H) 01/06/2022    TRIG 200 (H) 01/20/2021    TRIG 98 11/29/2018     Lab Results   Component Value Date    HDL 41 01/06/2022    HDL 40 01/20/2021    HDL 45 11/29/2018     Lab Results   Component Value Date    LDLCALC 90 01/06/2022    LDLCALC 90 01/20/2021    LDLCALC 75 11/29/2018     Lab Results   Component Value Date    LABVLDL 35 01/06/2022    LABVLDL 40 01/20/2021    LABVLDL 20 11/29/2018     No results found for: CHOLHDLRATIO   CREATININE   Date Value Ref Range Status   01/06/2022 0.7 0.5 - 1.0 mg/dL Final   11/27/2021 0.8 0.5 - 1.0 mg/dL Final   04/19/2021 0.8 0.5 - 1.0 mg/dL Final       The 10-year ASCVD risk score (Mini Christina, et al., 2013) is: 31.4%    Values used to calculate the score:      Age: 67 years      Sex: Female      Is Non- : No      Diabetic: Yes      Tobacco smoker: No      Systolic Blood Pressure: 745 mmHg      Is BP treated: Yes      HDL Cholesterol: 41 mg/dL      Total Cholesterol: 166 mg/dL     Physical Exam  Constitutional:       General: She is not in acute distress. Appearance: Normal appearance. HENT:      Head: Normocephalic and atraumatic. Right Ear: External ear normal.      Nose: Nose normal.      Mouth/Throat:      Mouth: Mucous membranes are moist.   Eyes:      Extraocular Movements: Extraocular movements intact. Conjunctiva/sclera: Conjunctivae normal.   Cardiovascular:      Rate and Rhythm: Normal rate and regular rhythm. Heart sounds: No murmur heard. Pulmonary:      Effort: Pulmonary effort is normal.      Breath sounds: Normal breath sounds. No wheezing. Musculoskeletal:         General: Normal range of motion.       Cervical back: Normal range of motion and neck supple. Lymphadenopathy:      Cervical: No cervical adenopathy. Neurological:      General: No focal deficit present. Mental Status: She is alert. Psychiatric:         Mood and Affect: Mood normal.         Behavior: Behavior normal.           Diabetic foot exam:   Left Foot:   Visual Exam: normal   Pulse DP: 2+ (normal)   Filament test: normal sensation    Right Foot:   Visual Exam: normal   Pulse DP: 2+ (normal)   Filament test: normal sensation       An electronic signature was used to authenticate this note. --Josh Rosario MD       *NOTE: This report was transcribed using voice recognition software. Every effort was made to ensure accuracy; however, inadvertent computerized transcription errors may be present. Low Dose CT (LDCT) Lung Screening criteria met:     Age 50-77(Medicare) or 50-80 (USPSTF)   Pack year smoking >20   Still smoking or less than 15 year since quit   No sign or symptoms of lung cancer   > 11 months since last LDCT     Risks and benefits of lung cancer screening with LDCT scans discussed:    Significance of positive screen - False-positive LDCT results often occur. 95% of all positive results do not lead to a diagnosis of cancer. Usually further imaging can resolve most false-positive results; however, some patients may require invasive procedures. Over diagnosis risk - 10% to 12% of screen-detected lung cancer cases are over diagnosed--that is, the cancer would not have been detected in the patient's lifetime without the screening. Need for follow up screens annually to continue lung cancer screening effectiveness     Risks associated with radiation from annual LDCT- Radiation exposure is about the same as for a mammogram, which is about 1/3 of the annual background radiation exposure from everyday life. Starting screening at age 54 is not likely to increase cancer risk from radiation exposure.     Patients with comorbidities resulting in life expectancy of < 10 years, or that would preclude treatment of an abnormality identified on CT, should not be screened due to lack of benefit. To obtain maximal benefit from this screening, smoking cessation and long-term abstinence from smoking is critical          On the basis of positive falls risk screening, assessment and plan is as follows: Patient notes is secondary to her hip and knee pain, wants to continue conservative treatment at this time, did discuss antiinflammatory diet.

## 2022-05-24 NOTE — TELEPHONE ENCOUNTER
Pt called and notified that her victoza from [facility] program arrived to office today.   Pt stated she will  at [facility] when she comes into office this afternoon

## 2022-06-13 ENCOUNTER — HOSPITAL ENCOUNTER (OUTPATIENT)
Dept: CT IMAGING | Age: 72
Discharge: HOME | End: 2022-06-15
Payer: MEDICARE

## 2022-06-13 DIAGNOSIS — Z87.891 PERSONAL HISTORY OF TOBACCO USE: ICD-10-CM

## 2022-06-13 DIAGNOSIS — Z12.2 SCREENING FOR LUNG CANCER: ICD-10-CM

## 2022-06-13 PROCEDURE — 71271 CT THORAX LUNG CANCER SCR C-: CPT

## 2022-06-18 DIAGNOSIS — E11.9 TYPE 2 DIABETES MELLITUS WITHOUT COMPLICATION, WITHOUT LONG-TERM CURRENT USE OF INSULIN (HCC): ICD-10-CM

## 2022-06-20 NOTE — TELEPHONE ENCOUNTER
Last Appointment:  9/30/2021  Future Appointments   Date Time Provider Radha Vargas   11/15/2022  1:00 PM [facility]   4/25/2023 11:00 AM Roxana Garnica MD [facility]

## 2022-06-21 RX ORDER — SIMVASTATIN 80 MG
TABLET ORAL
Qty: 90 TABLET | Refills: 3 | Status: SHIPPED | OUTPATIENT
Start: 2022-06-21

## 2022-06-29 ENCOUNTER — TELEPHONE (OUTPATIENT)
Dept: CASE MANAGEMENT | Age: 72
End: 2022-06-29

## 2022-06-29 NOTE — TELEPHONE ENCOUNTER
No call, encounter opened to process CT Lung Screening. CT Lung Screen: 6/13/2022    Impression   1. There is no pulmonary infiltrate, mass or suspicious pulmonary nodule. 2. Emphysematous changes. 3. Significant calcified plaque within the coronary arteries.       LUNG RADS:   Per ACR Lung-RADS Version 1.1       Category 1, Negative (No nodules and definitely benign nodules).       Management: Continue annual lung screening with LDCT in 12 months.       RECOMMENDATIONS:   If you would like to register your patient with the [facility], please contact the Nurse Navigator at   [phone]. Pack years: 37    Social History     Tobacco Use  Smoking Status: Former Smoker    Start Date: 1968   Quit Date: 03/18/2011   Types: Cigarettes   Packs/Day: 1   Years: 37   Pack Years: 37   Smokeless Tobacco: Never Used         Results letter sent to patient via my chart or mailed.      [facility]

## 2022-07-20 NOTE — TELEPHONE ENCOUNTER
Scheduled pt for EGD on 7/2/21 at 8:00 AM. Pt needs to arrive at [address] [facility] at 7:00 AM. Confirmed in office. Sent instruction sheet home with pt.   Electronically signed by Heaven Ventura on 6/2/21 at 9:22 AM EDT
No

## 2022-08-04 DIAGNOSIS — E11.9 TYPE 2 DIABETES MELLITUS WITHOUT COMPLICATION, WITHOUT LONG-TERM CURRENT USE OF INSULIN (HCC): ICD-10-CM

## 2022-08-04 RX ORDER — LANCETS
EACH MISCELLANEOUS
Qty: 200 EACH | Refills: 2 | OUTPATIENT
Start: 2022-08-04

## 2022-09-21 DIAGNOSIS — E11.9 TYPE 2 DIABETES MELLITUS WITHOUT COMPLICATION, WITHOUT LONG-TERM CURRENT USE OF INSULIN (HCC): ICD-10-CM

## 2022-09-22 RX ORDER — LANCETS
EACH MISCELLANEOUS
Qty: 200 EACH | Refills: 2 | Status: SHIPPED | OUTPATIENT
Start: 2022-09-22

## 2022-09-23 DIAGNOSIS — E11.9 TYPE 2 DIABETES MELLITUS WITHOUT COMPLICATION, WITHOUT LONG-TERM CURRENT USE OF INSULIN (HCC): ICD-10-CM

## 2022-09-23 RX ORDER — ISOPROPYL ALCOHOL 70 ML/100ML
1 SWAB TOPICAL 3 TIMES DAILY
Qty: 300 EACH | Refills: 3 | Status: SHIPPED | OUTPATIENT
Start: 2022-09-23

## 2022-09-23 NOTE — TELEPHONE ENCOUNTER
Last Appointment:  9/30/2021  Future Appointments   Date Time Provider Radha Johnsoni   10/31/2022 10:00 AM DO Jim Connors [facility]   11/15/2022  1:00 PM [facility]   4/25/2023 11:00 AM Marcy Keys MD [facility]

## 2022-10-24 ENCOUNTER — OFFICE VISIT (OUTPATIENT)
Dept: SURGERY | Age: 72
End: 2022-10-24
Payer: MEDICARE

## 2022-10-24 VITALS
BODY MASS INDEX: 35.28 KG/M2 | WEIGHT: 175 LBS | OXYGEN SATURATION: 96 % | HEART RATE: 73 BPM | HEIGHT: 59 IN | TEMPERATURE: 97.1 F

## 2022-10-24 DIAGNOSIS — L98.9 SKIN LESION: Primary | ICD-10-CM

## 2022-10-24 PROCEDURE — 99213 OFFICE O/P EST LOW 20 MIN: CPT | Performed by: PHYSICIAN ASSISTANT

## 2022-10-24 PROCEDURE — 11102 TANGNTL BX SKIN SINGLE LES: CPT | Performed by: PHYSICIAN ASSISTANT

## 2022-10-24 PROCEDURE — G8399 PT W/DXA RESULTS DOCUMENT: HCPCS | Performed by: PHYSICIAN ASSISTANT

## 2022-10-24 PROCEDURE — 1090F PRES/ABSN URINE INCON ASSESS: CPT | Performed by: PHYSICIAN ASSISTANT

## 2022-10-24 PROCEDURE — G8427 DOCREV CUR MEDS BY ELIG CLIN: HCPCS | Performed by: PHYSICIAN ASSISTANT

## 2022-10-24 PROCEDURE — 3017F COLORECTAL CA SCREEN DOC REV: CPT | Performed by: PHYSICIAN ASSISTANT

## 2022-10-24 PROCEDURE — G8417 CALC BMI ABV UP PARAM F/U: HCPCS | Performed by: PHYSICIAN ASSISTANT

## 2022-10-24 PROCEDURE — 1123F ACP DISCUSS/DSCN MKR DOCD: CPT | Performed by: PHYSICIAN ASSISTANT

## 2022-10-24 PROCEDURE — 1036F TOBACCO NON-USER: CPT | Performed by: PHYSICIAN ASSISTANT

## 2022-10-24 PROCEDURE — G8484 FLU IMMUNIZE NO ADMIN: HCPCS | Performed by: PHYSICIAN ASSISTANT

## 2022-10-24 RX ORDER — LIDOCAINE HYDROCHLORIDE AND EPINEPHRINE 10; 10 MG/ML; UG/ML
3 INJECTION, SOLUTION INFILTRATION; PERINEURAL ONCE
Status: COMPLETED | OUTPATIENT
Start: 2022-10-24 | End: 2022-10-24

## 2022-10-24 RX ADMIN — LIDOCAINE HYDROCHLORIDE AND EPINEPHRINE 3 ML: 10; 10 INJECTION, SOLUTION INFILTRATION; PERINEURAL at 09:26

## 2022-10-24 NOTE — PROGRESS NOTES
Department of Plastic Surgery - Adult  Attending Consult Note      CHIEF COMPLAINT:  Lesion of face     History Obtained From:  patient    HISTORY OF PRESENT ILLNESS:                The patient is a 67 y.o. female who presents with forehead suspicious nonhealing lesion. The patient states she has had this for approximately 6 months with no remittance of the lesion. She does bring to my attention she was seen in our office 1 year ago for similar lesions which were precancerous actinic keratosis. She subsequently had these erbium YAG laser destroyed. She presents her office today to have a full face exam.  She denies any changes in her medical history does bring to my attention today she is on an 81 mg aspirin.     Past Medical History:    Past Medical History:   Diagnosis Date    Acute MI ([facility] 07/11/08    CAD (coronary artery disease)     Dr. Venessa Montemayor    Carotid artery occlusion     Diabetes mellitus ([facility]     Diverticulitis     Hyperlipidemia     Hypertension     Obese     Osteoarthritis     Thyroid nodule      Past Surgical History:    Past Surgical History:   Procedure Laterality Date    CARPAL TUNNEL RELEASE      (L)    CARPAL TUNNEL RELEASE Right 12/13/2019    RIGHT CARPAL TUNNEL RELEASE ENDOSCOPIC performed by Taqueria Matthews MD at [place] [address]  03/10/2022    diverticula; polyp--jose angel    COLONOSCOPY N/A 3/10/2022    COLONOSCOPY WITH BIOPSY performed by Aleyda Carnes MD at [place] [address] CATH LAB PROCEDURE      stent placed     FINGER TRIGGER RELEASE Right 12/13/2019    RIGHT THUMB TRIGGER RELEASE performed by Taqueria Matthews MD at [facility] ([address] [place] [address])      KNEE SURGERY  05/2017    LT knee torn meniscus / DR Espinal  2017    THYROIDECTOMY N/A 01/16/2020    TOTAL THYROIDECTOMY--NERVE INTEGRITY MONITOR performed by Otoniel Morgan DO at [address]  07/02/2021    severe gastritis--jose angel    UPPER GASTROINTESTINAL ENDOSCOPY N/A 07/02/2021    EGD BIOPSY performed by Vishnu Li MD at [place] [address]  03/10/2022    gastritis; hiatal hernia--jose angel    UPPER GASTROINTESTINAL ENDOSCOPY N/A 3/10/2022    EGD BIOPSY performed by Vishnu Li MD at [facility] ENDOSCOPY     Current Medications:      Current Outpatient Medications   Medication Sig Dispense Refill    Alcohol Swabs (DROPSAFE ALCOHOL PREP) 70 % PADS Apply 1 packet topically in the morning, at noon, and at bedtime 300 each 3    Accu-Chek Softclix Lancets MISC TEST TWO TIMES DAILY 200 each 2    simvastatin (ZOCOR) 80 MG tablet TAKE 1 TABLET EVERY NIGHT.  90 tablet 3    levothyroxine (SYNTHROID) 100 MCG tablet Take 1 tablet by mouth Daily 90 tablet 3    atenolol (TENORMIN) 50 MG tablet Take 1 tablet by mouth daily 90 tablet 3    venlafaxine (EFFEXOR XR) 150 MG extended release capsule Take 1 capsule by mouth at bedtime TAKE 1 CAPSULE EVERY DAY 90 capsule 3    quinapril (ACCUPRIL) 5 MG tablet TAKE 1 TABLET EVERY NIGHT 90 tablet 3    blood glucose test strips (ACCU-CHEK GUIDE) strip TEST BLOOD SUGAR TWICE DAILY 200 strip 5    DROPLET PEN NEEDLES 31G X 6 MM MISC USE THREE TIMES DAILY 300 each 3    Liraglutide (VICTOZA) 18 MG/3ML SOPN SC injection Inject 1.8 mg into the skin daily 3 pen 3    acetaminophen (TYLENOL) 500 MG tablet Take 1 tablet by mouth 4 times daily as needed for Pain 360 tablet 1    aspirin 81 MG EC tablet Take 1 tablet by mouth daily LD 12-4-19 (Patient taking differently: Take 81 mg by mouth daily) 90 tablet 1    Blood Glucose Monitoring Suppl (ACCU-CHEK STEVEN PLUS) w/Device KIT CHECK BLOOD SUGAR TWICE DAILY 1 kit 0    pantoprazole (PROTONIX) 40 MG tablet Take 1 tablet by mouth in the morning and at bedtime (Patient not taking: Reported on 10/24/2022) 180 tablet 3    sucralfate (CARAFATE) 1 GM tablet Take 1 tablet by mouth 4 times daily (Patient not taking: Reported on 10/24/2022) 120 tablet 3    clotrimazole-betamethasone (LOTRISONE) 1-0.05 % cream APPLY  CREAM TOPICALLY TWICE DAILY (Patient not taking: Reported on 10/24/2022) 45 g 0    ibuprofen (ADVIL;MOTRIN) 800 MG tablet Take 1 tablet by mouth 2 times daily as needed for Pain 60 tablet 0     Current Facility-Administered Medications   Medication Dose Route Frequency Provider Last Rate Last Admin    lidocaine-EPINEPHrine 1 %-1:750222 injection 20 mL  20 mL IntraDERmal Once Dot MD Mandi         Allergies:  Patient has no known allergies. Social History:   Social History     Socioeconomic History    Marital status:      Spouse name: Not on file    Number of children: Not on file    Years of education: Not on file    Highest education level: Not on file   Occupational History    Occupation: retired    Tobacco Use    Smoking status: Former     Packs/day: 1.00     Years: 43.00     Pack years: 43.00     Types: Cigarettes     Start date:      Quit date: 3/18/2011     Years since quittin.6    Smokeless tobacco: Never   Vaping Use    Vaping Use: Never used   Substance and Sexual Activity    Alcohol use: No    Drug use: No    Sexual activity: Yes   Other Topics Concern    Not on file   Social History Narrative    Not on file     Social Determinants of Health     Financial Resource Strain: Not on file   Food Insecurity: Not on file   Transportation Needs: Not on file   Physical Activity: Not on file   Stress: Not on file   Social Connections: Not on file   Intimate Partner Violence: Not on file   Housing Stability: Not on file     Family History:   Family History   Problem Relation Age of Onset    Heart Disease Mother     Heart Disease Father     Diabetes Father     Heart Disease Sister     Heart Disease Maternal Grandmother     Heart Disease Paternal Grandmother     Diabetes Paternal Grandmother        REVIEW OF SYSTEMS:    CONSTITUTIONAL:  negative for  fevers, chills, sweats and fatigue  EYES: negative for dipolpia or acute vision loss.    RESPIRATORY: negative for  dry cough, cough with sputum, dyspnea, wheezing and chest pain  HENT:negative for pain, headache, difficulty swallowing or nose bleeds. CARDIOVASCULAR:  negative for  chest pain, dyspnea, palpitations, syncope  GASTROINTESTINAL:  negative for nausea, vomiting, change in bowel habits, diarrhea, constipation and abdominal pain  EXTREMITIES: negative for edema  MUSCULOSKELETAL: negative for muscle weakness  SKIN: positive for lesion,negative for itching or rashes. HEME: negative for easy brusing or bleeding  BEHAVIOR/PSYCH:  negative for poor appetite, increased appetite, decreased sleep and poor concentration    PHYSICAL EXAM:        VITALS:  Pulse 73   Temp 97.1 °F (36.2 °C) (Temporal)   Ht 4' 11\" (1.499 m)   Wt 175 lb (79.4 kg)   SpO2 96%   BMI 35.35 kg/m²   CONSTITUTIONAL:  awake, alert, cooperative, no apparent distress, and appears stated age  EYES: PERRLA, EOMI, no signs of occular infection  LUNGS:  No increased work of breathing, good air exchange  CARDIOVASCULAR:  regular rate and rhythm   EXTREMITIES: no signs of clubbing or cyanosis. MUSCULOSKELETAL: negative for flaccid muscle tone or spastic movements. NEURO: Cranial nerves II-XII grossly intact. No signs of agitated mood. SKIN: Left forehead lesion of uncertain behavior-  3mm x 3mm,  skin tone in color, irregular border, raised, no signs of bleeding,drainage or infection. Non tender to palpation.   Sand paperlike texture        DATA:    Labs: CBC:   Lab Results   Component Value Date/Time    WBC 5.9 01/06/2022 12:00 PM    RBC 4.48 01/06/2022 12:00 PM    HGB 13.2 01/06/2022 12:00 PM    HCT 41.0 01/06/2022 12:00 PM    MCV 91.5 01/06/2022 12:00 PM    MCH 29.5 01/06/2022 12:00 PM    MCHC 32.2 01/06/2022 12:00 PM    RDW 13.2 01/06/2022 12:00 PM     01/06/2022 12:00 PM    MPV 10.0 01/06/2022 12:00 PM     BMP:    Lab Results   Component Value Date/Time     01/06/2022 12:00 PM    K 5.1 01/06/2022 12:00 PM    K 4.9 11/27/2021 03:06 PM     01/06/2022 12:00 PM    CO2 24 01/06/2022 12:00 PM    BUN 20 01/06/2022 12:00 PM    LABALBU 4.3 01/06/2022 12:00 PM    CREATININE 0.7 01/06/2022 12:00 PM    CALCIUM 9.4 01/06/2022 12:00 PM    GFRAA >60 01/06/2022 12:00 PM    LABGLOM >60 01/06/2022 12:00 PM    GLUCOSE 147 01/06/2022 12:00 PM       Radiology Review:  No radiology needed at this time  Pathology Review: No Pathology reviewed       IMPRESSION/RECOMMENDATIONS:        Diagnosis  -) Lesion of left forehead, uncertain    -We will plan to proceed and have the lesion of the left forehead biopsied to rule out malignancy.    -The risks, benefits and options were discussed with the pt. The risks included but not limited to pain, bleeding, infection, heavy scarring, damage to surrounding structures, fluid collections, asymmetry, and need for further procedures. All of Her questions were answered to their satisfaction and She agrees to proceed with the procedure.    -After consent was obtained, the area was cleansed with an alcohol swab. Local anesthesia consisting of 1% lidocaine with 1:100,000 epinepherine was injected  surrounding the area. The local was allowed to work. Using a 10-blade the lesion was shaved, hemostasis was obtained and a bandage was placed. The procedure was performed by me. I have discussed with the patient that they should make every attempt to follow-up within this time interval in the event that the pathology or radiographic findings require further attention such as surgical or other medical intervention. They voiced complete understanding. The patient was educated to keep the bandage in place and apply bacitracin to the wound site BID. OK to shower at this time. Advised the patient that they can allow soap and water to rinse of the wound site while showering. Once they are done in the shower they are to pat dry the incision site with a clean paper towel.   No baths, hot tubs or soaking of the wound site at this time. Pt voices understanding. Photos obtained. Chaperone present for entire visit. FU- 7-14 days      This document is generated, in part, by voice recognition software and thus  syntax and grammatical errors are possible.     Caor James  9:03 AM  10/24/2022

## 2022-10-28 ENCOUNTER — SCHEDULED TELEPHONE ENCOUNTER (OUTPATIENT)
Dept: SURGERY | Age: 72
End: 2022-10-28

## 2022-10-28 DIAGNOSIS — L57.0 AK (ACTINIC KERATOSIS): Primary | ICD-10-CM

## 2022-10-28 PROCEDURE — 99999 PR OFFICE/OUTPT VISIT,PROCEDURE ONLY: CPT | Performed by: PHYSICIAN ASSISTANT

## 2022-10-28 NOTE — PROGRESS NOTES
Noy Bloom is a 67 y.o. female evaluated via telephone on 10/28/2022. Consent:  She and/or health care decision maker is aware that that she may receive a bill for this telephone service, depending on her insurance coverage, and has provided verbal consent to proceed: Yes          [address]      [address]            [address] [facility], [facility] [address], [address], [address] [place] [address], [address] [place] [address]               FINAL SURGICAL PATHOLOGY REPORT     NAME:            Cheyanne Brochure      Date of       10/24/2022                                            Collection:   Medical Record   [id number]              Date of       10/25/2022   Number:                                  Receipt:   Age:  67 Y        Sex:  F                Date          10/27/2022 14:25                                            Reported:   YOB: 1950   Financial        [id number]            Admitting     FRANCO   Number:                                  Physician:    [facility]   Patient          [address]   Location:                                Physician:    [facility]     Accession Number:  [id number]                                              Additional Physicians:COREEN GARCIA       Diagnosis:   Skin, left forehead lesion, shave biopsy:   Actinic keratosis, incompletely excised. Solar elastosis also present.                                                Anup Matos M.D.                                        (Electronic Signature)         Specimen Submitted:     SKIN LESION, LEFT FOREHEAD      Preoperative Dx:   Skin lesion         Microscopic Evaluation: Was performed. Gross Description:   Submitted in one part in formalin labeled \"Kylee Ny, left forehead lesion\" is a 0.4 x 0.4 x 0.1 cm tangential shave   biopsy of white-tan skin. The surgical margin is inked in black. Bisected through its base and entirely submitted. Block label A1.   (LUIS ARMANDO:AMBERLY)       Discussed with the patient her pathology results today. Informed her we will plan for erbium YAG laser destruction of the actinic keratosis in office under local anesthesia. She agreed and voiced her appreciation for the call today. The patient was in the state of [place] during the entirety of the phone conversation. Documentation:  I communicated with the patient and/or health care decision maker about the pathology results from their most recent biopsy. Details of this discussion including any medical advice provided:         I affirm this is a Patient Initiated Episode with a Patient who has not had a related appointment within my department in the past 7 days or scheduled within the next 24 hours. Patient identification was verified at the start of the visit: Yes    Total Time: minutes: <5 minutes (not billable)    The visit was conducted pursuant to the emergency declaration under the [facility], [address] [place] [address] authority and the [employer] General Act. Patient identification was verified, and a caregiver was present when appropriate. The patient was located in a state where the provider was credentialed to provide care.     Note: not billable if this call serves to triage the patient into an appointment for the relevant concern      MARGARETH Mckinney

## 2022-10-31 ENCOUNTER — OFFICE VISIT (OUTPATIENT)
Dept: CARDIOLOGY CLINIC | Age: 72
End: 2022-10-31
Payer: MEDICARE

## 2022-10-31 VITALS
DIASTOLIC BLOOD PRESSURE: 90 MMHG | HEART RATE: 68 BPM | BODY MASS INDEX: 35.76 KG/M2 | RESPIRATION RATE: 18 BRPM | SYSTOLIC BLOOD PRESSURE: 138 MMHG | HEIGHT: 59 IN | WEIGHT: 177.4 LBS

## 2022-10-31 DIAGNOSIS — I10 PRIMARY HYPERTENSION: Primary | ICD-10-CM

## 2022-10-31 DIAGNOSIS — I65.21 STENOSIS OF RIGHT CAROTID ARTERY: ICD-10-CM

## 2022-10-31 PROCEDURE — 1036F TOBACCO NON-USER: CPT | Performed by: INTERNAL MEDICINE

## 2022-10-31 PROCEDURE — 3074F SYST BP LT 130 MM HG: CPT | Performed by: INTERNAL MEDICINE

## 2022-10-31 PROCEDURE — G8399 PT W/DXA RESULTS DOCUMENT: HCPCS | Performed by: INTERNAL MEDICINE

## 2022-10-31 PROCEDURE — 1090F PRES/ABSN URINE INCON ASSESS: CPT | Performed by: INTERNAL MEDICINE

## 2022-10-31 PROCEDURE — G8484 FLU IMMUNIZE NO ADMIN: HCPCS | Performed by: INTERNAL MEDICINE

## 2022-10-31 PROCEDURE — G8427 DOCREV CUR MEDS BY ELIG CLIN: HCPCS | Performed by: INTERNAL MEDICINE

## 2022-10-31 PROCEDURE — 93000 ELECTROCARDIOGRAM COMPLETE: CPT | Performed by: INTERNAL MEDICINE

## 2022-10-31 PROCEDURE — 1123F ACP DISCUSS/DSCN MKR DOCD: CPT | Performed by: INTERNAL MEDICINE

## 2022-10-31 PROCEDURE — G8417 CALC BMI ABV UP PARAM F/U: HCPCS | Performed by: INTERNAL MEDICINE

## 2022-10-31 PROCEDURE — 99214 OFFICE O/P EST MOD 30 MIN: CPT | Performed by: INTERNAL MEDICINE

## 2022-10-31 PROCEDURE — 3017F COLORECTAL CA SCREEN DOC REV: CPT | Performed by: INTERNAL MEDICINE

## 2022-10-31 PROCEDURE — 3078F DIAST BP <80 MM HG: CPT | Performed by: INTERNAL MEDICINE

## 2022-10-31 NOTE — PROGRESS NOTES
CHIEF COMPLAINT: CAD    HISTORY OF PRESENT ILLNESS: Patient is a 67 y.o. female seen at the request of Marc Mcleod MD.      Mrs. Renee Sanchez is a 80-year-old female history of coronary artery disease status post the acute MI, status post PTCA stent to circumflex OM1 in 2008, hypertension and hyperlipidemia came to the office for follow-up visit. No CP or SOB. Past Medical History:   Diagnosis Date    [facility] 07/11/08    CAD (coronary artery disease)     Dr. Kodi Syed    Carotid artery occlusion     Diabetes mellitus ([facility]     Diverticulitis     Hyperlipidemia     Hypertension     Obese     Osteoarthritis     Thyroid nodule        Patient Active Problem List   Diagnosis    Hypertension    Hyperlipidemia    Carotid artery stenosis    Type 2 diabetes mellitus without complication (HCC)    Anxiety and depression    Polyarthralgia    Arthritis of both knees    Swelling of left hand    Left hand pain    Thyroid nodule    Major depressive disorder, single episode, moderate (HCC)    Heartburn    Indigestion    Tear of right rotator cuff    Rupture of right proximal biceps tendon    Gastroduodenitis    Osteochondropathy    AAA (abdominal aortic aneurysm) without rupture    History of Helicobacter pylori infection    Change in bowel habits    Blood in stool    At risk for colon cancer       No Known Allergies    Current Outpatient Medications   Medication Sig Dispense Refill    Alcohol Swabs (DROPSAFE ALCOHOL PREP) 70 % PADS Apply 1 packet topically in the morning, at noon, and at bedtime 300 each 3    Accu-Chek Softclix Lancets MISC TEST TWO TIMES DAILY 200 each 2    simvastatin (ZOCOR) 80 MG tablet TAKE 1 TABLET EVERY NIGHT.  90 tablet 3    levothyroxine (SYNTHROID) 100 MCG tablet Take 1 tablet by mouth Daily 90 tablet 3    pantoprazole (PROTONIX) 40 MG tablet Take 1 tablet by mouth in the morning and at bedtime 180 tablet 3    atenolol (TENORMIN) 50 MG tablet Take 1 tablet by mouth daily 90 tablet 3 venlafaxine (EFFEXOR XR) 150 MG extended release capsule Take 1 capsule by mouth at bedtime TAKE 1 CAPSULE EVERY DAY 90 capsule 3    quinapril (ACCUPRIL) 5 MG tablet TAKE 1 TABLET EVERY NIGHT 90 tablet 3    blood glucose test strips (ACCU-CHEK GUIDE) strip TEST BLOOD SUGAR TWICE DAILY 200 strip 5    DROPLET PEN NEEDLES 31G X 6 MM MISC USE THREE TIMES DAILY 300 each 3    Liraglutide (VICTOZA) 18 MG/3ML SOPN SC injection Inject 1.8 mg into the skin daily 3 pen 3    ibuprofen (ADVIL;MOTRIN) 800 MG tablet Take 1 tablet by mouth 2 times daily as needed for Pain 60 tablet 0    acetaminophen (TYLENOL) 500 MG tablet Take 1 tablet by mouth 4 times daily as needed for Pain 360 tablet 1    aspirin 81 MG EC tablet Take 1 tablet by mouth daily LD 19 (Patient taking differently: Take 81 mg by mouth daily) 90 tablet 1    Blood Glucose Monitoring Suppl (ACCU-CHEK STEVEN PLUS) w/Device KIT CHECK BLOOD SUGAR TWICE DAILY 1 kit 0    sucralfate (CARAFATE) 1 GM tablet Take 1 tablet by mouth 4 times daily (Patient not taking: Reported on 10/31/2022) 120 tablet 3    clotrimazole-betamethasone (LOTRISONE) 1-0.05 % cream APPLY  CREAM TOPICALLY TWICE DAILY (Patient not taking: No sig reported) 45 g 0     Current Facility-Administered Medications   Medication Dose Route Frequency Provider Last Rate Last Admin    lidocaine-EPINEPHrine 1 %-1:214614 injection 20 mL  20 mL IntraDERmal Once Shirley Oconnell MD           Social History     Socioeconomic History    Marital status:      Spouse name: Not on file    Number of children: Not on file    Years of education: Not on file    Highest education level: Not on file   Occupational History    Occupation: retired    Tobacco Use    Smoking status: Former     Packs/day: 1.00     Years: 43.00     Pack years: 43.00     Types: Cigarettes     Start date:      Quit date: 3/18/2011     Years since quittin.6    Smokeless tobacco: Never   Vaping Use    Vaping Use: Never used Substance and Sexual Activity    Alcohol use: No    Drug use: No    Sexual activity: Yes   Other Topics Concern    Not on file   Social History Narrative    Not on file     Social Determinants of Health     Financial Resource Strain: Not on file   Food Insecurity: Not on file   Transportation Needs: Not on file   Physical Activity: Not on file   Stress: Not on file   Social Connections: Not on file   Intimate Partner Violence: Not on file   Housing Stability: Not on file       Family History   Problem Relation Age of Onset    Heart Disease Mother     Heart Disease Father     Diabetes Father     Heart Disease Sister     Heart Disease Maternal Grandmother     Heart Disease Paternal Grandmother     Diabetes Paternal Grandmother      Review of Systems:  Heart: as above   Lungs: as above   Eyes: denies changes in vision or discharge. Ears: denies changes in hearing or pain. Nose: denies epistaxis or masses   Throat: denies sore throat or trouble swallowing. Neuro: denies numbness, tingling, tremors. Skin: denies rashes or itching. : denies hematuria, dysuria   GI: denies vomiting, diarrhea   Psych: denies mood changed, anxiety, depression. All other systems negative. Physical Exam   BP (!) 138/90   Pulse 68   Resp 18   Ht 4' 11\" (1.499 m)   Wt 177 lb 6.4 oz (80.5 kg)   BMI 35.83 kg/m²   Constitutional: Oriented to person, place, and time. Well-developed and well-nourished. No distress. Head: Normocephalic and atraumatic. Eyes: EOM are normal. Pupils are equal, round, and reactive to light. Neck: Normal range of motion. Neck supple. No hepatojugular reflux and no JVD present. Carotid bruit is not present. No tracheal deviation present. No thyromegaly present. Cardiovascular: Normal rate, regular rhythm, normal heart sounds and intact distal pulses. Exam reveals no gallop and no friction rub. No murmur heard.   Pulmonary/Chest: Effort normal and breath sounds normal. No respiratory

## 2022-11-15 ENCOUNTER — OFFICE VISIT (OUTPATIENT)
Dept: FAMILY MEDICINE CLINIC | Age: 72
End: 2022-11-15
Payer: MEDICARE

## 2022-11-15 VITALS
BODY MASS INDEX: 35.68 KG/M2 | DIASTOLIC BLOOD PRESSURE: 66 MMHG | OXYGEN SATURATION: 94 % | TEMPERATURE: 97 F | WEIGHT: 177 LBS | HEIGHT: 59 IN | HEART RATE: 67 BPM | SYSTOLIC BLOOD PRESSURE: 110 MMHG

## 2022-11-15 DIAGNOSIS — G89.29 CHRONIC PAIN OF BOTH KNEES: ICD-10-CM

## 2022-11-15 DIAGNOSIS — G89.29 CHRONIC RIGHT SHOULDER PAIN: ICD-10-CM

## 2022-11-15 DIAGNOSIS — Z23 NEED FOR PROPHYLACTIC VACCINATION AGAINST DIPHTHERIA-TETANUS-PERTUSSIS (DTP): ICD-10-CM

## 2022-11-15 DIAGNOSIS — I10 PRIMARY HYPERTENSION: ICD-10-CM

## 2022-11-15 DIAGNOSIS — Z00.00 MEDICARE ANNUAL WELLNESS VISIT, SUBSEQUENT: Primary | ICD-10-CM

## 2022-11-15 DIAGNOSIS — Z23 NEED FOR PROPHYLACTIC VACCINATION AND INOCULATION AGAINST VARICELLA: ICD-10-CM

## 2022-11-15 DIAGNOSIS — E66.01 SEVERE OBESITY (BMI 35.0-39.9) WITH COMORBIDITY (HCC): ICD-10-CM

## 2022-11-15 DIAGNOSIS — E11.9 TYPE 2 DIABETES MELLITUS WITHOUT COMPLICATION, WITHOUT LONG-TERM CURRENT USE OF INSULIN (HCC): ICD-10-CM

## 2022-11-15 DIAGNOSIS — M25.532 LEFT WRIST PAIN: ICD-10-CM

## 2022-11-15 DIAGNOSIS — I65.21 STENOSIS OF RIGHT CAROTID ARTERY: ICD-10-CM

## 2022-11-15 DIAGNOSIS — M25.511 CHRONIC RIGHT SHOULDER PAIN: ICD-10-CM

## 2022-11-15 DIAGNOSIS — M25.561 CHRONIC PAIN OF BOTH KNEES: ICD-10-CM

## 2022-11-15 DIAGNOSIS — F32.1 MAJOR DEPRESSIVE DISORDER, SINGLE EPISODE, MODERATE (HCC): ICD-10-CM

## 2022-11-15 DIAGNOSIS — F32.A ANXIETY AND DEPRESSION: ICD-10-CM

## 2022-11-15 DIAGNOSIS — M25.562 CHRONIC PAIN OF BOTH KNEES: ICD-10-CM

## 2022-11-15 DIAGNOSIS — K44.9 HIATAL HERNIA: ICD-10-CM

## 2022-11-15 DIAGNOSIS — E78.5 HYPERLIPIDEMIA, UNSPECIFIED HYPERLIPIDEMIA TYPE: ICD-10-CM

## 2022-11-15 DIAGNOSIS — Z23 INFLUENZA VACCINE NEEDED: ICD-10-CM

## 2022-11-15 DIAGNOSIS — E89.0 H/O THYROIDECTOMY: ICD-10-CM

## 2022-11-15 DIAGNOSIS — I71.40 ABDOMINAL AORTIC ANEURYSM (AAA) WITHOUT RUPTURE, UNSPECIFIED PART: ICD-10-CM

## 2022-11-15 DIAGNOSIS — F41.9 ANXIETY AND DEPRESSION: ICD-10-CM

## 2022-11-15 PROBLEM — E04.1 THYROID NODULE: Status: RESOLVED | Noted: 2020-01-16 | Resolved: 2022-11-15

## 2022-11-15 PROBLEM — Z98.890 H/O THYROIDECTOMY: Status: ACTIVE | Noted: 2022-11-15

## 2022-11-15 PROBLEM — Z90.89 H/O THYROIDECTOMY: Status: ACTIVE | Noted: 2022-11-15

## 2022-11-15 LAB — HBA1C MFR BLD: 6.6 %

## 2022-11-15 PROCEDURE — 3017F COLORECTAL CA SCREEN DOC REV: CPT | Performed by: STUDENT IN AN ORGANIZED HEALTH CARE EDUCATION/TRAINING PROGRAM

## 2022-11-15 PROCEDURE — G0008 ADMIN INFLUENZA VIRUS VAC: HCPCS | Performed by: STUDENT IN AN ORGANIZED HEALTH CARE EDUCATION/TRAINING PROGRAM

## 2022-11-15 PROCEDURE — 3078F DIAST BP <80 MM HG: CPT | Performed by: STUDENT IN AN ORGANIZED HEALTH CARE EDUCATION/TRAINING PROGRAM

## 2022-11-15 PROCEDURE — G8484 FLU IMMUNIZE NO ADMIN: HCPCS | Performed by: STUDENT IN AN ORGANIZED HEALTH CARE EDUCATION/TRAINING PROGRAM

## 2022-11-15 PROCEDURE — 1123F ACP DISCUSS/DSCN MKR DOCD: CPT | Performed by: STUDENT IN AN ORGANIZED HEALTH CARE EDUCATION/TRAINING PROGRAM

## 2022-11-15 PROCEDURE — 90694 VACC AIIV4 NO PRSRV 0.5ML IM: CPT | Performed by: STUDENT IN AN ORGANIZED HEALTH CARE EDUCATION/TRAINING PROGRAM

## 2022-11-15 PROCEDURE — 3044F HG A1C LEVEL LT 7.0%: CPT | Performed by: STUDENT IN AN ORGANIZED HEALTH CARE EDUCATION/TRAINING PROGRAM

## 2022-11-15 PROCEDURE — G0439 PPPS, SUBSEQ VISIT: HCPCS | Performed by: STUDENT IN AN ORGANIZED HEALTH CARE EDUCATION/TRAINING PROGRAM

## 2022-11-15 PROCEDURE — 83036 HEMOGLOBIN GLYCOSYLATED A1C: CPT | Performed by: STUDENT IN AN ORGANIZED HEALTH CARE EDUCATION/TRAINING PROGRAM

## 2022-11-15 PROCEDURE — 3074F SYST BP LT 130 MM HG: CPT | Performed by: STUDENT IN AN ORGANIZED HEALTH CARE EDUCATION/TRAINING PROGRAM

## 2022-11-15 RX ORDER — GLUCOSAMINE HCL/CHONDROITIN SU 500-400 MG
CAPSULE ORAL
Qty: 100 STRIP | Refills: 5 | Status: SHIPPED | OUTPATIENT
Start: 2022-11-15

## 2022-11-15 SDOH — ECONOMIC STABILITY: FOOD INSECURITY: WITHIN THE PAST 12 MONTHS, THE FOOD YOU BOUGHT JUST DIDN'T LAST AND YOU DIDN'T HAVE MONEY TO GET MORE.: NEVER TRUE

## 2022-11-15 SDOH — ECONOMIC STABILITY: FOOD INSECURITY: WITHIN THE PAST 12 MONTHS, YOU WORRIED THAT YOUR FOOD WOULD RUN OUT BEFORE YOU GOT MONEY TO BUY MORE.: NEVER TRUE

## 2022-11-15 ASSESSMENT — PATIENT HEALTH QUESTIONNAIRE - PHQ9
2. FEELING DOWN, DEPRESSED OR HOPELESS: 0
SUM OF ALL RESPONSES TO PHQ QUESTIONS 1-9: 0
1. LITTLE INTEREST OR PLEASURE IN DOING THINGS: 0
6. FEELING BAD ABOUT YOURSELF - OR THAT YOU ARE A FAILURE OR HAVE LET YOURSELF OR YOUR FAMILY DOWN: 0
SUM OF ALL RESPONSES TO PHQ9 QUESTIONS 1 & 2: 0
SUM OF ALL RESPONSES TO PHQ QUESTIONS 1-9: 0
8. MOVING OR SPEAKING SO SLOWLY THAT OTHER PEOPLE COULD HAVE NOTICED. OR THE OPPOSITE, BEING SO FIGETY OR RESTLESS THAT YOU HAVE BEEN MOVING AROUND A LOT MORE THAN USUAL: 0
7. TROUBLE CONCENTRATING ON THINGS, SUCH AS READING THE NEWSPAPER OR WATCHING TELEVISION: 0
4. FEELING TIRED OR HAVING LITTLE ENERGY: 0
9. THOUGHTS THAT YOU WOULD BE BETTER OFF DEAD, OR OF HURTING YOURSELF: 0

## 2022-11-15 ASSESSMENT — LIFESTYLE VARIABLES
HOW MANY STANDARD DRINKS CONTAINING ALCOHOL DO YOU HAVE ON A TYPICAL DAY: PATIENT DOES NOT DRINK
HOW OFTEN DO YOU HAVE A DRINK CONTAINING ALCOHOL: NEVER

## 2022-11-15 ASSESSMENT — SOCIAL DETERMINANTS OF HEALTH (SDOH): HOW HARD IS IT FOR YOU TO PAY FOR THE VERY BASICS LIKE FOOD, HOUSING, MEDICAL CARE, AND HEATING?: NOT HARD AT ALL

## 2022-11-15 NOTE — PATIENT INSTRUCTIONS
Personalized Preventive Plan for Martha Sanders - 11/15/2022  Medicare offers a range of preventive health benefits. Some of the tests and screenings are paid in full while other may be subject to a deductible, co-insurance, and/or copay. Some of these benefits include a comprehensive review of your medical history including lifestyle, illnesses that may run in your family, and various assessments and screenings as appropriate. After reviewing your medical record and screening and assessments performed today your provider may have ordered immunizations, labs, imaging, and/or referrals for you. A list of these orders (if applicable) as well as your Preventive Care list are included within your After Visit Summary for your review. Other Preventive Recommendations:    A preventive eye exam performed by an eye specialist is recommended every 1-2 years to screen for glaucoma; cataracts, macular degeneration, and other eye disorders. A preventive dental visit is recommended every 6 months. Try to get at least 150 minutes of exercise per week or 10,000 steps per day on a pedometer . Order or download the FREE \"Exercise & Physical Activity: Your Everyday Guide\" from [employer]. Call 2-734.714.1429 or search [employer] online. You need 1617-3761 mg of calcium and 6422-6537 IU of vitamin D per day. It is possible to meet your calcium requirement with diet alone, but a vitamin D supplement is usually necessary to meet this goal.  When exposed to the sun, use a sunscreen that protects against both UVA and UVB radiation with an SPF of 30 or greater. Reapply every 2 to 3 hours or after sweating, drying off with a towel, or swimming. Always wear a seat belt when traveling in a car. Always wear a helmet when riding a bicycle or motorcycle.

## 2022-11-15 NOTE — PROGRESS NOTES
Medicare Annual Wellness Visit    Jazmín Jackson is here for Medicare AWV (Annual wellness visit), Rash (Rash on the back of neck), and Wrist Pain (Left wrist pain x1 month no injury)    Assessment & Plan   Medicare annual wellness visit, subsequent  Need for prophylactic vaccination against diphtheria-tetanus-pertussis (DTP)  -     Tdap (ADACEL) 5-2-15.5 LF-MCG/0.5 injection; Inject 0.5 mLs into the muscle once for 1 dose, Disp-0.5 mL, R-0Print  Need for prophylactic vaccination and inoculation against varicella  -     zoster recombinant adjuvanted vaccine [facility]) 50 MCG/0.5ML SUSR injection; Inject 0.5 mLs into the muscle once for 1 dose, Disp-0.5 mL, R-0Print    Influenza vaccine needed  -     Influenza, FLUAD, (age 72 y+), IM, Preservative Free, 0.5 mL  Severe obesity (BMI 35.0-39. 9) with comorbidity ([address]  Type 2 diabetes mellitus without complication, without long-term current use of insulin (HCC)  Chronic, has been well controlled, a1c 6.6 she continues to check her blood sugars twice a day, this helps her stay on target, last A1c was in May and she would like repeat today to see how she is doing, Continue on the Victoza  -     POCT glycosylated hemoglobin (Hb A1C)  -     blood glucose monitor strips; Test 2 times a day & as needed for symptoms of irregular blood glucose. Dispense sufficient amount for indicated testing frequency plus additional to accommodate PRN testing needs.  Whichever strips go with her new meter, Disp-100 strip, R-5, Normal  Left wrist pain  Subacute, in about 4 weeks, does not remember any injury, will get x-ray as it is significantly swollen, will do the Voltaren gel, did provide her with a sample, recommend she do this for about 2 weeks to see if she can reduce inflammation and regaining strength in that wrist, she does not think that a brace is going to help her, she does rest her hand on a pillow when she reads, recommended avoiding any activities that exacerbate the condition, on exam it does appear to be her carpal bones, there is no snuffbox tenderness or tenderness along the radial tendons  -     XR WRIST LEFT (MIN 3 VIEWS); Future  -     diclofenac sodium (VOLTAREN) 1 % GEL; Apply 4 g topically 4 times daily To right shoulder, left wrist, and knees, Topical, 4 TIMES DAILY Starting Tue 11/15/2022, Disp-150 g, R-4, Normal  Chronic right shoulder pain  Chronic, rotator cuff was ruled out, she does have a bicep tendon tear, again she can apply Voltaren gel to this as well, should avoid the NSAIDs orally with her hiatal hernia and problems with her stomach  -     diclofenac sodium (VOLTAREN) 1 % GEL; Apply 4 g topically 4 times daily To right shoulder, left wrist, and knees, Topical, 4 TIMES DAILY Starting Tue 11/15/2022, Disp-150 g, R-4, Normal  Chronic pain of both knees  Chronic, likely osteoarthritis, again the Voltaren gel can be used on these knees  -     diclofenac sodium (VOLTAREN) 1 % GEL;  Apply 4 g topically 4 times daily To right shoulder, left wrist, and knees, Topical, 4 TIMES DAILY Starting Tue 11/15/2022, Disp-150 g, R-4, Normal  Hiatal hernia  Did have EGD did show H. pylori, she has been treated, per surgery she is cured, does have hiatal hernia 3 cm, may be contributing to her gastric symptoms recommended small meals, making sure she is not constipated, did show a picture with a hiatal hernia as, continue on the PPI  Primary hypertension  Chronic, well controlled, continue current medications and treatment plan    Hyperlipidemia, unspecified hyperlipidemia type  Stenosis of right carotid artery  She does follow with vascular surgery, also follows with her abdominal aortic aneurysm, notes no size increase  Anxiety and depression  H/O thyroidectomy  Chronic, well controlled, continue current medications and treatment plan    Major depressive disorder, single episode, moderate (HCC)  Chronic, well controlled, continue current medications and treatment plan    Abdominal aortic aneurysm (AAA) without rupture, unspecified part    Recommendations for Preventive Services Due: see orders and patient instructions/AVS.  Recommended screening schedule for the next 5-10 years is provided to the patient in written form: see Patient Instructions/AVS.     Return for 6 m ch dizease, Medicare Annual Wellness Visit in 1 year. Subjective   The following acute and/or chronic problems were also addressed today:  See above  She is having left wrist pain for 1 month. She does not remember any injury, no new yard work. Not weak, shapr and stabbing. Every time she picks up any heavy things hurts hurt when bent radially. She has not been taking any meds. Sh will be going to vascular for carotid and AAA    Patient's complete Health Risk Assessment and screening values have been reviewed and are found in Flowsheets. The following problems were reviewed today and where indicated follow up appointments were made and/or referrals ordered.     Positive Risk Factor Screenings with Interventions:    Fall Risk:  Do you feel unsteady or are you worried about falling? : no  2 or more falls in past year?: (!) yes  Fall with injury in past year?: no   Fall Risk Interventions:    Patient declines any further evaluation/treatment for this issue            General Health and ACP:  Do you have a Living Will?: (!) No    Advance Directives       Power of  Living Will ACP-Advance Directive ACP-Power of     Not on File Not on File Not on File Not on File          General Health Risk Interventions:  No Living Will: Advance Care Planning addressed with patient today    Health Habits/Nutrition:  Physical Activity: Inactive    Days of Exercise per Week: 0 days    Minutes of Exercise per Session: 0 min     Have you lost any weight without trying in the past 3 months?: No  Body mass index: (!) 35.75  Have you seen the dentist within the past year?: (!) No  Health Habits/Nutrition Provider  Coreen Ware MD as Surgeon (Vascular Surgery)  Siobhan Salazar MD as Consulting Physician (Cardiology)  Gabriella Jones as Imaging Navigator     Reviewed and updated this visit:  Tobacco  Allergies  Meds  Problems  Med Hx  Surg Hx  Soc Hx  Fam Hx                 Advance Care Planning   Advanced Care Planning: Discussed the patients choices for care and treatment in case of a health event that adversely affects decision-making abilities. Also discussed the patients long-term treatment options. Reviewed with the patient the appropriate state-specific advance directive documents. Reviewed the process of designating a competent adult as an Agent (or -in-fact) that could take make health care decisions for the patient if incompetent. Patient was asked to complete the declaration forms, either acknowledge the forms by a public notary or an eligible witness and provide a signed copy to the practice office. Time spent (minutes): 1       Cardiovascular Disease Risk Counseling: Assessed the patient's risk to develop cardiovascular disease and reviewed main risk factors. Reviewed steps to reduce disease risk including:   Quitting tobacco use, reducing amount smoked, or not starting the habit  Making healthy food choices  Being physically active and gradualy increasing activity levels   Reduce weight and determine a healthy BMI goal  Monitor blood pressure and treat if higher than 140/90 mmHg  Maintain blood total cholesterol levels under 5 mmol/l or 190 mg/dl  Maintain LDL cholesterol levels under 3.0 mmol/l or 115 mg/dl   Control blood glucose levels  Consider taking aspirin (75 mg daily), once blood pressure is controlled   Provided a follow up plan. Time spent (minutes): 1    Obesity Counseling: Assessed behavioral health risks and factors affecting choice of behavior. Suggested weight control approaches, including dietary changes behavioral modification and follow up plan.  Provided educational and support documentation.   Time spent (minutes): 1

## 2022-12-08 NOTE — PROGRESS NOTES
Subjective: Follow up today for Er YAG laser ablation to left forehead Actinic Keratosis. Denies fever, nausea, vomiting, leg pain or swelling. The patient voices understanding of the procedure they are having today and would like to proceed. Objective: There were no vitals taken for this visit. left forehead AK  Lesion- 3mm x 3mm      Assessment:    Patient Active Problem List   Diagnosis    Hypertension    Hyperlipidemia    Carotid artery stenosis    Type 2 diabetes mellitus without complication (HCC)    Polyarthralgia    Arthritis of both knees    Swelling of left hand    Left hand pain    Major depressive disorder, single episode, moderate (HCC)    Indigestion    Rupture of right proximal biceps tendon    AAA (abdominal aortic aneurysm) without rupture    History of Helicobacter pylori infection    Change in bowel habits    At risk for colon cancer    Hiatal hernia    H/O thyroidectomy       Plan:       Diagnosis  -) left forehead Actinic Keratosis      After consent was obtained, the area was cleansed with betadine. Local anesthesia consisting of 1% lidocaine with 1:100,000 epinepherine was injected surrounding the area. The local was allowed to work. The procedure was carried out By Dr Yoanna Albarran    Risks of laser therapy were explained including bleeding, scarring, hyopigmentation, hyperpigmentation that can be worsened by sun exposure. There is a risk of herpes or bacterial infection. The patient is educated to utilize sun protection for 3-4 months after the procedure, understands that there will be some pain involved during the procedure. Diet: regular diet  Activity: activity as tolerated  Shower/Bathing: OK to shower at this time . Advised the patient that they can allow soap and water to rinse of the incision site while showering. Once they are done in the shower they are to pat dry the incision site with a clean paper towel.   No baths, hot tubs or soaking of the wound site at this time.  Pt voices understanding. Dressings /Splint /Wound Care: keep wound clean and dry apply  bacitracin to the wound site BID and cover with a gauze dressing PRN  Medications: OTC pain control PRN  Educated to contact physician with concerns or signs of infection (redness, increasing pain, discharge, odor, fever). Follow Up 7 days      This document is generated, in part, by voice recognition software and thus  syntax and grammatical errors are possible.     Geovanny Land MD  11:47 AM  12/12/2022

## 2022-12-09 ENCOUNTER — PROCEDURE VISIT (OUTPATIENT)
Dept: SURGERY | Age: 72
End: 2022-12-09
Payer: MEDICARE

## 2022-12-09 VITALS
TEMPERATURE: 97.5 F | SYSTOLIC BLOOD PRESSURE: 124 MMHG | OXYGEN SATURATION: 96 % | RESPIRATION RATE: 20 BRPM | HEART RATE: 68 BPM | DIASTOLIC BLOOD PRESSURE: 76 MMHG | HEIGHT: 59 IN | WEIGHT: 170 LBS | BODY MASS INDEX: 34.27 KG/M2

## 2022-12-09 DIAGNOSIS — L57.0 AK (ACTINIC KERATOSIS): Primary | ICD-10-CM

## 2022-12-09 PROCEDURE — 17000 DESTRUCT PREMALG LESION: CPT | Performed by: PLASTIC SURGERY

## 2022-12-12 RX ORDER — LIDOCAINE HYDROCHLORIDE AND EPINEPHRINE 10; 10 MG/ML; UG/ML
3 INJECTION, SOLUTION INFILTRATION; PERINEURAL ONCE
Status: SHIPPED | OUTPATIENT
Start: 2022-12-12

## 2022-12-15 ENCOUNTER — TELEPHONE (OUTPATIENT)
Dept: FAMILY MEDICINE CLINIC | Age: 72
End: 2022-12-15

## 2022-12-15 DIAGNOSIS — E11.9 TYPE 2 DIABETES MELLITUS WITHOUT COMPLICATION, WITHOUT LONG-TERM CURRENT USE OF INSULIN (HCC): ICD-10-CM

## 2022-12-15 NOTE — TELEPHONE ENCOUNTER
Pharmacy called they need verification on direction to the alcohol  pads that were sent please call [phone]

## 2022-12-20 RX ORDER — ISOPROPYL ALCOHOL 70 ML/100ML
1 SWAB TOPICAL DAILY
Qty: 100 EACH | Refills: 3 | Status: SHIPPED | OUTPATIENT
Start: 2022-12-20

## 2022-12-27 ENCOUNTER — SCHEDULED TELEPHONE ENCOUNTER (OUTPATIENT)
Dept: SURGERY | Age: 72
End: 2022-12-27

## 2022-12-27 DIAGNOSIS — L57.0 AK (ACTINIC KERATOSIS): Primary | ICD-10-CM

## 2022-12-27 PROCEDURE — 99999 PR OFFICE/OUTPT VISIT,PROCEDURE ONLY: CPT | Performed by: PHYSICIAN ASSISTANT

## 2022-12-27 NOTE — PROGRESS NOTES
Fadi Key is a 67 y.o. female evaluated via telephone on 12/27/2022. Consent:  She and/or health care decision maker is aware that that she may receive a bill for this telephone service, depending on her insurance coverage, and has provided verbal consent to proceed: Yes    The patient was in the [address] during the entirety of the phone conversation. Documentation:  I communicated with the patient and/or health care decision maker about the pathology results from their most recent biopsy. Details of this discussion including any medical advice provided:     I called the patient to inform her of her wound healing progression and scar care. She states she is well-healed at this time and voices no complaints. Follow-up as needed        I affirm this is a Patient Initiated Episode with a Patient who has not had a related appointment within my department in the past 7 days or scheduled within the next 24 hours. Patient identification was verified at the start of the visit: Yes    Total Time: minutes: <5 minutes (not billable)    The visit was conducted pursuant to the emergency declaration under the [facility], [address] [place] [address] authority and the [employer] [employer] [place] [employer] Act. Patient identification was verified, and a caregiver was present when appropriate. The patient was located in a state where the provider was credentialed to provide care.     Note: not billable if this call serves to triage the patient into an appointment for the relevant concern      MARGARETH Greene

## 2023-01-03 DIAGNOSIS — E11.9 TYPE 2 DIABETES MELLITUS WITHOUT COMPLICATION, WITHOUT LONG-TERM CURRENT USE OF INSULIN (HCC): ICD-10-CM

## 2023-01-03 RX ORDER — ISOPROPYL ALCOHOL 70 ML/100ML
1 SWAB TOPICAL 2 TIMES DAILY
Qty: 180 EACH | Refills: 3 | Status: SHIPPED | OUTPATIENT
Start: 2023-01-03

## 2023-03-01 ENCOUNTER — TELEPHONE (OUTPATIENT)
Dept: VASCULAR SURGERY | Age: 73
End: 2023-03-01

## 2023-03-01 DIAGNOSIS — I71.43 INFRARENAL ABDOMINAL AORTIC ANEURYSM (AAA) WITHOUT RUPTURE: ICD-10-CM

## 2023-03-01 DIAGNOSIS — I65.23 BILATERAL CAROTID ARTERY STENOSIS: Primary | ICD-10-CM

## 2023-03-01 NOTE — TELEPHONE ENCOUNTER
Message left on pt's voicemail for a return call to schedule a AAA u/s and carotid u/s prior to her next ov with Dr. Gregory Mclain. negative Affect and characteristics of appearance, verbalizations, behaviors are appropriate

## 2023-03-15 DIAGNOSIS — F41.9 ANXIETY AND DEPRESSION: ICD-10-CM

## 2023-03-15 DIAGNOSIS — I10 ESSENTIAL HYPERTENSION: ICD-10-CM

## 2023-03-15 DIAGNOSIS — E04.1 THYROID NODULE: ICD-10-CM

## 2023-03-15 DIAGNOSIS — K21.9 GASTROESOPHAGEAL REFLUX DISEASE WITHOUT ESOPHAGITIS: ICD-10-CM

## 2023-03-15 DIAGNOSIS — F32.A ANXIETY AND DEPRESSION: ICD-10-CM

## 2023-03-15 RX ORDER — PANTOPRAZOLE SODIUM 40 MG/1
40 TABLET, DELAYED RELEASE ORAL 2 TIMES DAILY
Qty: 180 TABLET | Refills: 3 | OUTPATIENT
Start: 2023-03-15

## 2023-03-15 RX ORDER — VENLAFAXINE HYDROCHLORIDE 150 MG/1
CAPSULE, EXTENDED RELEASE ORAL
Qty: 90 CAPSULE | Refills: 3 | OUTPATIENT
Start: 2023-03-15

## 2023-03-15 RX ORDER — LEVOTHYROXINE SODIUM 0.1 MG/1
TABLET ORAL
Qty: 90 TABLET | Refills: 3 | OUTPATIENT
Start: 2023-03-15

## 2023-03-15 RX ORDER — QUINAPRIL 5 1/1
TABLET ORAL
Qty: 90 TABLET | Refills: 3 | OUTPATIENT
Start: 2023-03-15

## 2023-03-29 DIAGNOSIS — I10 ESSENTIAL HYPERTENSION: ICD-10-CM

## 2023-03-29 RX ORDER — QUINAPRIL 5 1/1
TABLET ORAL
Qty: 90 TABLET | Refills: 3 | OUTPATIENT
Start: 2023-03-29

## 2023-04-07 ENCOUNTER — HOSPITAL ENCOUNTER (OUTPATIENT)
Dept: CARDIOLOGY | Age: 73
Discharge: HOME | End: 2023-04-07
Payer: MEDICARE

## 2023-04-07 DIAGNOSIS — I71.43 INFRARENAL ABDOMINAL AORTIC ANEURYSM (AAA) WITHOUT RUPTURE (HCC): ICD-10-CM

## 2023-04-07 DIAGNOSIS — I65.23 BILATERAL CAROTID ARTERY STENOSIS: ICD-10-CM

## 2023-04-07 PROCEDURE — 93880 EXTRACRANIAL BILAT STUDY: CPT

## 2023-04-07 PROCEDURE — 93978 VASCULAR STUDY: CPT

## 2023-04-07 NOTE — PROGRESS NOTES
[facility] Heart & Vascular Lab - [facility]    This is a pre read worksheet - prior to official physician interpretation    Raphael Reji  1950  Date of study: 4/7/23    Indication for study:  Carotid artery stenosis  Study : Bilateral Carotid Artery Duplex Examination    Duplex examination of the RIGHT carotid artery system identifies atherosclerotic plaque. The peak systolic velocity in internal carotid artery was 151 centimeters / second. The maximum end diastolic velocity was 39 centimeters / second. The ICA/CCA ratio is 1.5. The right vertebral artery has antegrade flow. Duplex examination of the LEFT carotid artery system identifies atherosclerotic plaque. The peak systolic velocity in internal carotid artery was 117 centimeters / second. The maximum end diastolic velocity was 36 centimeters / second. The ICA/CCA ratio is 1. .  The left vertebral artery has antegrade flow.     Limited d/t vessel depth and motion        LAST STUDY  4/27/2021  Rt 50[address]69  Lt wnl

## 2023-04-07 NOTE — PROGRESS NOTES
[facility] Heart & Vascular Lab - [facility]    This is a pre read worksheet - prior to official physician interpretation    Edd Davalos  1950  Date of study: 4/7/23    Indication for study:  AAA  Study :  Aortic Ultrasound    Diameter Aorta:     Proximal:  1.9 x 2.0 cm     Mid:   cm     Distal:  3.2 x 3.1 cm     Right iliac:  cm     Left iliac:  cm    Additional comments: limited d/t depth and bowel gas      LAST STUDY   11/27/2021 CT  3.3 cm

## 2023-04-25 ENCOUNTER — OFFICE VISIT (OUTPATIENT)
Dept: VASCULAR SURGERY | Age: 73
End: 2023-04-25
Payer: MEDICARE

## 2023-04-25 DIAGNOSIS — I71.43 INFRARENAL ABDOMINAL AORTIC ANEURYSM (AAA) WITHOUT RUPTURE (HCC): Primary | ICD-10-CM

## 2023-04-25 DIAGNOSIS — I65.23 BILATERAL CAROTID ARTERY STENOSIS: ICD-10-CM

## 2023-04-25 PROCEDURE — 99213 OFFICE O/P EST LOW 20 MIN: CPT | Performed by: PHYSICIAN ASSISTANT

## 2023-04-25 PROCEDURE — G8399 PT W/DXA RESULTS DOCUMENT: HCPCS | Performed by: PHYSICIAN ASSISTANT

## 2023-04-25 PROCEDURE — G8427 DOCREV CUR MEDS BY ELIG CLIN: HCPCS | Performed by: PHYSICIAN ASSISTANT

## 2023-04-25 PROCEDURE — 1123F ACP DISCUSS/DSCN MKR DOCD: CPT | Performed by: PHYSICIAN ASSISTANT

## 2023-04-25 PROCEDURE — 1090F PRES/ABSN URINE INCON ASSESS: CPT | Performed by: PHYSICIAN ASSISTANT

## 2023-04-25 PROCEDURE — G8417 CALC BMI ABV UP PARAM F/U: HCPCS | Performed by: PHYSICIAN ASSISTANT

## 2023-04-25 PROCEDURE — 1036F TOBACCO NON-USER: CPT | Performed by: PHYSICIAN ASSISTANT

## 2023-04-25 PROCEDURE — 3017F COLORECTAL CA SCREEN DOC REV: CPT | Performed by: PHYSICIAN ASSISTANT

## 2023-04-25 RX ORDER — ROSUVASTATIN CALCIUM 40 MG/1
40 TABLET, COATED ORAL NIGHTLY
COMMUNITY
Start: 2023-04-23

## 2023-04-25 NOTE — PROGRESS NOTES
Vascular Surgery Outpatient Progress Note      Chief Complaint   Patient presents with    Circulatory Problem     Follow up AAA, carotid stenosis. HISTORY OF PRESENT ILLNESS:                The patient is a 68 y.o. female who returns for follow-up evaluation of carotid artery stenosis and small AAA. She denies any hospitalizations or surgeries or changes to overall health. She denies unilateral weakness, slurred speech of amaurosis fugax. She is not a smoker. She is on daily asa/statin therapy.     Past Medical History:        Diagnosis Date    AAA (abdominal aortic aneurysm) ([facility] [address]     Acute MI ([facility] [address] 07/11/2008    CAD (coronary artery disease)     Dr. Yuan Czech    Carotid artery occlusion     Diabetes mellitus ([facility] [address]     Diverticulitis     Gastroduodenitis 01/31/2012    Hyperlipidemia     Hypertension     Obese     Osteoarthritis     Thyroid nodule     Thyroid nodule 01/16/2020     Past Surgical History:        Procedure Laterality Date    CARPAL TUNNEL RELEASE      (L)    CARPAL TUNNEL RELEASE Right 12/13/2019    RIGHT CARPAL TUNNEL RELEASE ENDOSCOPIC performed by Devora Sanchez MD at [address] [place] [address]  03/10/2022    diverticula; polyp--jose angel    COLONOSCOPY N/A 3/10/2022    COLONOSCOPY WITH BIOPSY performed by Lucio Jorgensen MD at [place] [address] CATH LAB PROCEDURE      stent placed     FINGER TRIGGER RELEASE Right 12/13/2019    RIGHT THUMB TRIGGER RELEASE performed by Devora Sanchez MD at . [facility] ([address] [place] [address])      KNEE SURGERY  05/2017    LT knee torn meniscus / DR Espinal  2017    THYROIDECTOMY N/A 01/16/2020    TOTAL THYROIDECTOMY--NERVE INTEGRITY MONITOR performed by Janine Desouza DO at [address] [place] [address] Rd  07/02/2021    severe gastritis--jose angel    UPPER GASTROINTESTINAL ENDOSCOPY N/A 07/02/2021    EGD BIOPSY performed by Lucio Jorgensen MD at [facility]  03/10/2022    gastritis; hiatal

## 2023-11-10 ENCOUNTER — TELEPHONE (OUTPATIENT)
Dept: CASE MANAGEMENT | Age: 73
End: 2023-11-10

## 2023-11-10 NOTE — TELEPHONE ENCOUNTER
I called the patient and she confirmed her CT lung screening at [facility] on 11/13/2023 at 2:00 pm.  I reminded the patient to arrive at 1:30 pm, enter through the main entrance, and register. Patient confirmed.           Electronically signed by Donn Hanna on 11/10/23 at 3:34 PM EST

## 2023-11-13 ENCOUNTER — HOSPITAL ENCOUNTER (OUTPATIENT)
Dept: CT IMAGING | Age: 73
Discharge: HOME | End: 2023-11-15
Attending: FAMILY MEDICINE
Payer: MEDICARE

## 2023-11-13 DIAGNOSIS — Z87.891 PERSONAL HISTORY OF TOBACCO USE, PRESENTING HAZARDS TO HEALTH: ICD-10-CM

## 2023-11-13 DIAGNOSIS — F17.211 CIGARETTE NICOTINE DEPENDENCE IN REMISSION: ICD-10-CM

## 2023-11-13 PROCEDURE — 71271 CT THORAX LUNG CANCER SCR C-: CPT

## 2023-11-14 ENCOUNTER — TELEPHONE (OUTPATIENT)
Dept: CASE MANAGEMENT | Age: 73
End: 2023-11-14

## 2023-11-14 NOTE — TELEPHONE ENCOUNTER
No call, encounter opened to process CT Lung Screening. CT Lung Screen: 11/13/2023    IMPRESSION:  1. There is no pulmonary infiltrate, mass or suspicious pulmonary nodule  2. Emphysematous changes  3. Significant calcified plaque seen within the coronary arteries. LUNG RADS:  Lung-RADS 1 - Negative ()     Management:  12 month screening LDCT     RECOMMENDATIONS:  If you would like to register your patient with the [facility], please contact the Nurse Navigator at  [phone]. Pack years: 37    Social History     Tobacco Use  Smoking Status: Former Smoker    Start Date: 1968   Quit Date: 03/18/2011   Types: Cigarettes   Packs/Day: 1   Years: 37   Pack Years: 37   Smokeless Tobacco: Never         Results letter sent to patient via my chart or mailed.      [address]

## 2023-11-30 ENCOUNTER — HOSPITAL ENCOUNTER (OUTPATIENT)
Dept: MRI IMAGING | Age: 73
Discharge: HOME | End: 2023-12-02
Payer: MEDICARE

## 2023-11-30 DIAGNOSIS — M25.532 LEFT WRIST PAIN: ICD-10-CM

## 2023-11-30 PROCEDURE — 73221 MRI JOINT UPR EXTREM W/O DYE: CPT

## 2023-12-21 DIAGNOSIS — E11.9 TYPE 2 DIABETES MELLITUS WITHOUT COMPLICATION, WITHOUT LONG-TERM CURRENT USE OF INSULIN (HCC): ICD-10-CM

## 2023-12-22 RX ORDER — ISOPROPYL ALCOHOL 70 ML/100ML
SWAB TOPICAL
Refills: 3 | OUTPATIENT
Start: 2023-12-22

## 2024-03-06 ENCOUNTER — OFFICE VISIT (OUTPATIENT)
Dept: CARDIOLOGY CLINIC | Age: 74
End: 2024-03-06
Payer: MEDICARE

## 2024-03-06 VITALS
WEIGHT: 174.8 LBS | SYSTOLIC BLOOD PRESSURE: 118 MMHG | RESPIRATION RATE: 18 BRPM | HEART RATE: 64 BPM | BODY MASS INDEX: 35.24 KG/M2 | DIASTOLIC BLOOD PRESSURE: 78 MMHG | HEIGHT: 59 IN

## 2024-03-06 DIAGNOSIS — I65.21 STENOSIS OF RIGHT CAROTID ARTERY: Primary | ICD-10-CM

## 2024-03-06 PROCEDURE — 1123F ACP DISCUSS/DSCN MKR DOCD: CPT | Performed by: INTERNAL MEDICINE

## 2024-03-06 PROCEDURE — 3074F SYST BP LT 130 MM HG: CPT | Performed by: INTERNAL MEDICINE

## 2024-03-06 PROCEDURE — 99214 OFFICE O/P EST MOD 30 MIN: CPT | Performed by: INTERNAL MEDICINE

## 2024-03-06 PROCEDURE — 3078F DIAST BP <80 MM HG: CPT | Performed by: INTERNAL MEDICINE

## 2024-03-06 PROCEDURE — 93000 ELECTROCARDIOGRAM COMPLETE: CPT | Performed by: INTERNAL MEDICINE

## 2024-03-06 RX ORDER — LEVOTHYROXINE SODIUM 0.07 MG/1
75 TABLET ORAL DAILY
COMMUNITY

## 2024-03-06 RX ORDER — OMEPRAZOLE 10 MG/1
10 CAPSULE, DELAYED RELEASE ORAL 2 TIMES DAILY
COMMUNITY

## 2024-03-06 RX ORDER — CEPHALEXIN 500 MG/1
1 TABLET ORAL 3 TIMES DAILY
COMMUNITY
Start: 2024-02-08

## 2024-03-06 RX ORDER — DULAGLUTIDE 0.75 MG/.5ML
INJECTION, SOLUTION SUBCUTANEOUS
COMMUNITY
Start: 2024-02-12

## 2024-03-06 RX ORDER — VALSARTAN 40 MG/1
40 TABLET ORAL DAILY
COMMUNITY
Start: 2024-02-19

## 2024-03-06 NOTE — PROGRESS NOTES
CHIEF COMPLAINT: CAD    HISTORY OF PRESENT ILLNESS: Patient is a 74 y.o. female seen at the request of William Stahl Jr., DO.      Mrs. Ny is a 68-year-old female history of coronary artery disease status post the acute MI, status post PTCA stent to circumflex OM1 in 2008, hypertension and hyperlipidemia came to the office for follow-up visit.     No CP or SOB.     Past Medical History:   Diagnosis Date    AAA (abdominal aortic aneurysm) (HCC)     Acute MI (HCC) 07/11/2008    CAD (coronary artery disease)     Dr. Rodriguez    Carotid artery occlusion     Diabetes mellitus (HCC)     Diverticulitis     Gastroduodenitis 01/31/2012    Hyperlipidemia     Hypertension     Obese     Osteoarthritis     Thyroid nodule     Thyroid nodule 01/16/2020       Patient Active Problem List   Diagnosis    Hypertension    Hyperlipidemia    Carotid artery stenosis    Type 2 diabetes mellitus without complication (HCC)    Polyarthralgia    Arthritis of both knees    Swelling of left hand    Left hand pain    Major depressive disorder, single episode, moderate (HCC)    Indigestion    Rupture of right proximal biceps tendon    AAA (abdominal aortic aneurysm) without rupture (HCC)    History of Helicobacter pylori infection    Change in bowel habits    At risk for colon cancer    Hiatal hernia    H/O thyroidectomy       No Known Allergies    Current Outpatient Medications   Medication Sig Dispense Refill    omeprazole (PRILOSEC) 10 MG delayed release capsule Take 1 capsule by mouth in the morning and 1 capsule in the evening.      TRULICITY 0.75 MG/0.5ML SOPN INJECT 1/2 (ONE-HALF) ML SUBCUTANEOUSLY ONCE A WEEK      Cephalexin 500 MG TABS Take 1 tablet by mouth 3 times daily      valsartan (DIOVAN) 40 MG tablet Take 1 tablet by mouth daily      levothyroxine (SYNTHROID) 75 MCG tablet Take 1 tablet by mouth Daily      rosuvastatin (CRESTOR) 40 MG tablet Take 1 tablet by mouth nightly      Alcohol Swabs (DROPSAFE ALCOHOL PREP)

## 2024-09-30 NOTE — TELEPHONE ENCOUNTER
----- Message from Swati Slade MD sent at 7/21/2021 10:03 AM EDT -----  She should be on the sucralfate for a minimum of 6 weeks before reducing the dose. Then she can go from 4x/day to 3x/day for 2 weeks. If symptoms don't recur, she can the go to 2x/day for 2 weeks. ..continue dropping dose as long as symptoms do not return. [FreeTextEntry1] : A sharp excisional debridement of the wound was performed with a 15 blade. < 20 sq cm was debrided. Silvadene and an ace wrap were applied

## 2024-11-01 NOTE — TELEPHONE ENCOUNTER
Patient daughter called again. She wants to know what we are doing about her mom's insulin until she is seen by the new provider. She also needs a referral to ortho. 68-year-old female with past medical history of spinal stenosis, osteoporosis, hyperlipidemia, bronchiectasis, IgA IgG deficiency recent diagnosis and hospitalization +UA, with a culture growing Group B strep and positive blood Cx also growing group B strep, due to osteomyelitis and lumbar spine on 10/15/24. Started on IV antibiotics and discharged with PICC line with continuation of ceftriaxone 2 g daily until 11/14 presenting with abdominal pain, nausea and multiple episodes of diarrhea. Admitted for diarrhea, r/o C. diff vs. other inflammatory/infectious causes.    #Diarrhea, r/o C. diff  #Abdominal pain  #Nausea w/o vomiting  - admit to medicine  - In the ED, VSS  - CTAP 10/30/2024 with Pancolitis, either infectious or inflammatory. Hepatosplenomegaly. Splenic varices. Rule out portal hypertension.   - UA with trace blood, leuks, negative nitrites, many bacteria  - C diff neg, stool GI PCR neg, stool cx neg  - pain control prn  - hyoscyamine prn  - c/w famotidine 20mg   - c/w probiotic  - zofran prn   - ID recs appreciated  - GI recs appreciated    #s/p Osteomyelitis of lumbar spine   - s/p ceftriaxone 2g daily until 11/14/24  - changed to vanco per ID   - c/w IV and po vanco on dc per ID    #Hypokalemia, improved  - K+ 4.3  - repleted   - monitor BMP    #IgG and IgA deficiency  - s/p albuterol   - c/w sodium chloride 3% inhalation    #Depression/anxiety   -c/w Fetzima     #HLD  -c/w atorvastatin 10mg    #DVT ppx  - encourage ambulation    #GI ppx  - famotidine 20mg po    #Diet  - regular    *Case seen and discussed with Dr. Zaragoza. 68-year-old female with past medical history of spinal stenosis, osteoporosis, hyperlipidemia, bronchiectasis, IgA IgG deficiency recent diagnosis and hospitalization +UA, with a culture growing Group B strep and positive blood Cx also growing group B strep, due to osteomyelitis and lumbar spine on 10/15/24. Started on IV antibiotics and discharged with PICC line with continuation of ceftriaxone 2 g daily until 11/14 presenting with abdominal pain, nausea and multiple episodes of diarrhea. Admitted for diarrhea, r/o C. diff vs. other inflammatory/infectious causes.    #Diarrhea secondary to pancolitis   #Abdominal pain  #Nausea w/o vomiting  - admit to medicine  - In the ED, VSS  - CTAP 10/30/2024 with Pancolitis, either infectious or inflammatory. Hepatosplenomegaly. Splenic varices. Rule out portal hypertension.   - UA with trace blood, leuks, negative nitrites, many bacteria  - C diff neg, stool GI PCR neg, stool cx neg  - pain control prn  - hyoscyamine prn  - c/w famotidine 20mg   - c/w probiotic  - zofran prn   - ID recs appreciated  - GI recs appreciated    #s/p Osteomyelitis of lumbar spine   - s/p ceftriaxone 2g daily until 11/14/24  - changed to vanco per ID   - c/w IV and po vanco on dc per ID    #Hypokalemia, improved  - K+ 4.3  - repleted   - monitor BMP    #IgG and IgA deficiency  - s/p albuterol   - c/w sodium chloride 3% inhalation    #Depression/anxiety   -c/w Fetzima     #HLD  -c/w atorvastatin 10mg    #DVT ppx  - encourage ambulation    #GI ppx  - famotidine 20mg po    #Diet  - regular    *Case seen and discussed with Dr. Zaragoza.

## 2025-02-17 ENCOUNTER — HOSPITAL ENCOUNTER (OUTPATIENT)
Dept: CT IMAGING | Age: 75
Discharge: HOME | End: 2025-02-19
Payer: MEDICARE

## 2025-02-17 DIAGNOSIS — Z87.891 FORMER SMOKER: ICD-10-CM

## 2025-02-17 DIAGNOSIS — Z87.891 PERSONAL HISTORY OF TOBACCO USE: ICD-10-CM

## 2025-02-17 PROCEDURE — 71271 CT THORAX LUNG CANCER SCR C-: CPT

## 2025-03-05 ENCOUNTER — OFFICE VISIT (OUTPATIENT)
Dept: CARDIOLOGY CLINIC | Age: 75
End: 2025-03-05
Payer: MEDICARE

## 2025-03-05 VITALS
WEIGHT: 170.6 LBS | OXYGEN SATURATION: 94 % | DIASTOLIC BLOOD PRESSURE: 64 MMHG | TEMPERATURE: 96.9 F | HEIGHT: 59 IN | SYSTOLIC BLOOD PRESSURE: 118 MMHG | RESPIRATION RATE: 18 BRPM | BODY MASS INDEX: 34.39 KG/M2 | HEART RATE: 64 BPM

## 2025-03-05 DIAGNOSIS — I65.21 STENOSIS OF RIGHT CAROTID ARTERY: Primary | ICD-10-CM

## 2025-03-05 PROCEDURE — 1123F ACP DISCUSS/DSCN MKR DOCD: CPT | Performed by: INTERNAL MEDICINE

## 2025-03-05 PROCEDURE — 1036F TOBACCO NON-USER: CPT | Performed by: INTERNAL MEDICINE

## 2025-03-05 PROCEDURE — 1159F MED LIST DOCD IN RCRD: CPT | Performed by: INTERNAL MEDICINE

## 2025-03-05 PROCEDURE — G8417 CALC BMI ABV UP PARAM F/U: HCPCS | Performed by: INTERNAL MEDICINE

## 2025-03-05 PROCEDURE — 93000 ELECTROCARDIOGRAM COMPLETE: CPT | Performed by: INTERNAL MEDICINE

## 2025-03-05 PROCEDURE — 99214 OFFICE O/P EST MOD 30 MIN: CPT | Performed by: INTERNAL MEDICINE

## 2025-03-05 PROCEDURE — G8427 DOCREV CUR MEDS BY ELIG CLIN: HCPCS | Performed by: INTERNAL MEDICINE

## 2025-03-05 PROCEDURE — 3078F DIAST BP <80 MM HG: CPT | Performed by: INTERNAL MEDICINE

## 2025-03-05 PROCEDURE — G8399 PT W/DXA RESULTS DOCUMENT: HCPCS | Performed by: INTERNAL MEDICINE

## 2025-03-05 PROCEDURE — 3074F SYST BP LT 130 MM HG: CPT | Performed by: INTERNAL MEDICINE

## 2025-03-05 PROCEDURE — 1090F PRES/ABSN URINE INCON ASSESS: CPT | Performed by: INTERNAL MEDICINE

## 2025-03-05 PROCEDURE — 3017F COLORECTAL CA SCREEN DOC REV: CPT | Performed by: INTERNAL MEDICINE

## 2025-03-05 RX ORDER — TIRZEPATIDE 7.5 MG/.5ML
INJECTION, SOLUTION SUBCUTANEOUS
COMMUNITY
Start: 2025-02-07

## 2025-03-05 NOTE — PROGRESS NOTES
thyroidectomy     1. Hx of CAD:     Hx of MI in 2008 with stents to left circumflex artery and OM.    Stable stress 6/18.     ASA/BB/statin.     2. HTN: Observe.    3. Lipids: Statin.     4. DM: Per PCP.     5. AAA: Observe. Stable  4/19/2023.    6. Hypothyroid: On HRT.     7. Pre-op: For left foot procedure. Patient is an acceptable risk to proceed. Okay to hold ASA for 7 days prior.     Available external charts reviewed.   Available imaging and evaluations independently reviewed.   Interviewed and discussed patient with available family.    Stephanie Rodriguez D.O.  Cardiologist  Cardiology, [facility]

## 2025-04-04 DIAGNOSIS — I71.43 INFRARENAL ABDOMINAL AORTIC ANEURYSM (AAA) WITHOUT RUPTURE: ICD-10-CM

## 2025-04-04 DIAGNOSIS — I65.23 BILATERAL CAROTID ARTERY STENOSIS: Primary | ICD-10-CM

## 2025-04-17 RX ORDER — VENLAFAXINE HYDROCHLORIDE 37.5 MG/1
37.5 CAPSULE, EXTENDED RELEASE ORAL DAILY
COMMUNITY
Start: 2025-01-11

## 2025-04-17 RX ORDER — FAMOTIDINE 20 MG/1
20 TABLET, FILM COATED ORAL DAILY
COMMUNITY

## 2025-04-21 DIAGNOSIS — M20.22 HALLUX RIGIDUS OF LEFT FOOT: ICD-10-CM

## 2025-04-21 LAB
ANION GAP SERPL CALCULATED.3IONS-SCNC: 10 MMOL/L (ref 7–16)
BUN BLDV-MCNC: 18 MG/DL (ref 8–23)
CALCIUM SERPL-MCNC: 9.2 MG/DL (ref 8.8–10.2)
CHLORIDE BLD-SCNC: 106 MMOL/L (ref 98–107)
CO2: 25 MMOL/L (ref 22–29)
CREAT SERPL-MCNC: 0.9 MG/DL (ref 0.5–1)
GFR, ESTIMATED: 68 ML/MIN/1.73M2
GLUCOSE BLD-MCNC: 118 MG/DL (ref 74–99)
HCT VFR BLD CALC: 43.1 % (ref 34–48)
HEMOGLOBIN: 13.5 G/DL (ref 11.5–15.5)
MCH RBC QN AUTO: 29.3 PG (ref 26–35)
MCHC RBC AUTO-ENTMCNC: 31.3 G/DL (ref 32–34.5)
MCV RBC AUTO: 93.7 FL (ref 80–99.9)
PDW BLD-RTO: 13.4 % (ref 11.5–15)
PLATELET # BLD: 207 K/UL (ref 130–450)
PMV BLD AUTO: 10.1 FL (ref 7–12)
POTASSIUM SERPL-SCNC: 4.7 MMOL/L (ref 3.5–5.1)
RBC # BLD: 4.6 M/UL (ref 3.5–5.5)
SODIUM BLD-SCNC: 141 MMOL/L (ref 136–145)
WBC # BLD: 6.2 K/UL (ref 4.5–11.5)

## 2025-04-22 ENCOUNTER — HOSPITAL ENCOUNTER (OUTPATIENT)
Dept: CARDIOLOGY | Age: 75
Discharge: HOME | End: 2025-04-24
Payer: MEDICARE

## 2025-04-22 ENCOUNTER — OFFICE VISIT (OUTPATIENT)
Dept: VASCULAR SURGERY | Age: 75
End: 2025-04-22
Payer: MEDICARE

## 2025-04-22 ENCOUNTER — ANESTHESIA EVENT (OUTPATIENT)
Dept: OPERATING ROOM | Age: 75
End: 2025-04-22
Payer: MEDICARE

## 2025-04-22 DIAGNOSIS — I71.43 INFRARENAL ABDOMINAL AORTIC ANEURYSM (AAA) WITHOUT RUPTURE: ICD-10-CM

## 2025-04-22 DIAGNOSIS — I71.43 INFRARENAL ABDOMINAL AORTIC ANEURYSM (AAA) WITHOUT RUPTURE: Primary | ICD-10-CM

## 2025-04-22 DIAGNOSIS — I65.23 BILATERAL CAROTID ARTERY STENOSIS: ICD-10-CM

## 2025-04-22 LAB
VAS AORTA DIST AP: 3.17 CM
VAS AORTA DIST TR: 3.21 CM
VAS AORTA MID AP: 2.01 CM
VAS AORTA MID TRANS: 1.98 CM
VAS AORTA PROX AP: 2.08 CM
VAS AORTA PROX TR: 2.03 CM
VAS LEFT CCA DIST EDV: 20.5 CM/S
VAS LEFT CCA DIST PSV: 101.7 CM/S
VAS LEFT CCA PROX EDV: 25.2 CM/S
VAS LEFT CCA PROX PSV: 83.4 CM/S
VAS LEFT COM ILIAC AP: 0.9 CM
VAS LEFT COM ILIAC TRANS: 0.79 CM
VAS LEFT ECA EDV: 22.9 CM/S
VAS LEFT ECA PSV: 133.5 CM/S
VAS LEFT ICA DIST EDV: 32.5 CM/S
VAS LEFT ICA DIST PSV: 114.1 CM/S
VAS LEFT ICA MID EDV: 32.3 CM/S
VAS LEFT ICA MID PSV: 109.8 CM/S
VAS LEFT ICA PROX EDV: 43.4 CM/S
VAS LEFT ICA PROX PSV: 128.6 CM/S
VAS LEFT ICA/CCA PSV: 1.3 NO UNITS
VAS LEFT VERTEBRAL EDV: 14 CM/S
VAS LEFT VERTEBRAL PSV: 40.4 CM/S
VAS RIGHT CCA DIST EDV: 23.2 CM/S
VAS RIGHT CCA DIST PSV: 77.6 CM/S
VAS RIGHT CCA PROX EDV: 19.4 CM/S
VAS RIGHT CCA PROX PSV: 90.8 CM/S
VAS RIGHT COM ILIAC AP: 1.1 CM
VAS RIGHT COM ILIAC TRANS: 1.08 CM
VAS RIGHT ECA EDV: 25.5 CM/S
VAS RIGHT ECA PSV: 226.5 CM/S
VAS RIGHT ICA DIST EDV: 30.7 CM/S
VAS RIGHT ICA DIST PSV: 83.3 CM/S
VAS RIGHT ICA MID EDV: 41.5 CM/S
VAS RIGHT ICA MID PSV: 119.6 CM/S
VAS RIGHT ICA PROX EDV: 36.3 CM/S
VAS RIGHT ICA PROX PSV: 120.3 CM/S
VAS RIGHT ICA/CCA PSV: 1.3 NO UNITS
VAS RIGHT VERTEBRAL EDV: 16.4 CM/S
VAS RIGHT VERTEBRAL PSV: 52.6 CM/S

## 2025-04-22 PROCEDURE — G8427 DOCREV CUR MEDS BY ELIG CLIN: HCPCS | Performed by: PHYSICIAN ASSISTANT

## 2025-04-22 PROCEDURE — 1123F ACP DISCUSS/DSCN MKR DOCD: CPT | Performed by: PHYSICIAN ASSISTANT

## 2025-04-22 PROCEDURE — G8399 PT W/DXA RESULTS DOCUMENT: HCPCS | Performed by: PHYSICIAN ASSISTANT

## 2025-04-22 PROCEDURE — 93880 EXTRACRANIAL BILAT STUDY: CPT

## 2025-04-22 PROCEDURE — 1090F PRES/ABSN URINE INCON ASSESS: CPT | Performed by: PHYSICIAN ASSISTANT

## 2025-04-22 PROCEDURE — G8417 CALC BMI ABV UP PARAM F/U: HCPCS | Performed by: PHYSICIAN ASSISTANT

## 2025-04-22 PROCEDURE — 93976 VASCULAR STUDY: CPT

## 2025-04-22 PROCEDURE — 3017F COLORECTAL CA SCREEN DOC REV: CPT | Performed by: PHYSICIAN ASSISTANT

## 2025-04-22 PROCEDURE — 99212 OFFICE O/P EST SF 10 MIN: CPT | Performed by: PHYSICIAN ASSISTANT

## 2025-04-22 PROCEDURE — 1036F TOBACCO NON-USER: CPT | Performed by: PHYSICIAN ASSISTANT

## 2025-04-22 PROCEDURE — 1159F MED LIST DOCD IN RCRD: CPT | Performed by: PHYSICIAN ASSISTANT

## 2025-04-22 NOTE — PROGRESS NOTES
Vascular Surgery Outpatient Progress Note      Chief Complaint   Patient presents with    Follow-up     Aaa. michaela       HISTORY OF PRESENT ILLNESS:                The patient is a 75 y.o. female who returns for follow-up evaluation of carotid artery stenosis and small AAA.  She denies any hospitalizations or surgeries or changes to overall health. She denies unilateral weakness, slurred speech of amaurosis fugax.    She is not a smoker. She is on daily asa/statin therapy.    Past Medical History:        Diagnosis Date    AAA (abdominal aortic aneurysm)     just watching  (states its very small)    Acute MI (HCC) 07/11/2008    CAD (coronary artery disease)     Dr. Rodriguez  has 1 stent    Carotid stenosis     just watching    Diabetes mellitus (HCC)     Diverticulitis     Gastroduodenitis 01/31/2012    GERD (gastroesophageal reflux disease)     Hyperlipidemia     Hypertension     Obese     Osteoarthritis     Thyroid nodule 01/16/2020     Past Surgical History:        Procedure Laterality Date    CARPAL TUNNEL RELEASE      (L)    CARPAL TUNNEL RELEASE Right 12/13/2019    RIGHT CARPAL TUNNEL RELEASE ENDOSCOPIC performed by Williams Nguyễn MD at [facility] OR    COLONOSCOPY  03/10/2022    diverticula; polyp--jose angel    COLONOSCOPY N/A 03/10/2022    COLONOSCOPY WITH BIOPSY performed by Cesar Saxena MD at [facility] ENDOSCOPY    DIAGNOSTIC CARDIAC CATH LAB PROCEDURE  2008    1 stent placed    FINGER TRIGGER RELEASE Right 12/13/2019    RIGHT THUMB TRIGGER RELEASE performed by Williams Nguyễn MD at [facility] OR    HYSTERECTOMY (CERVIX STATUS UNKNOWN)      KNEE SURGERY  05/2017    LT knee torn meniscus / DR Espinal  2017    THYROIDECTOMY N/A 01/16/2020    TOTAL THYROIDECTOMY--NERVE INTEGRITY MONITOR performed by Greg Perkins DO at [facility] OR    UPPER GASTROINTESTINAL ENDOSCOPY  07/02/2021    severe gastritis--jose angel    UPPER GASTROINTESTINAL ENDOSCOPY N/A 07/02/2021    EGD BIOPSY performed by Cesar Saxena MD at [facility] ENDOSCOPY    UPPER

## 2025-04-24 NOTE — ANESTHESIA PRE PROCEDURE
DM, hyperthyroidism::..                 Abdominal:   (+) obese          Vascular:           ROS comment: AAA has been watched.. Other Findings:         Anesthesia Plan      MAC     ASA 3     (FBS at home =110 mg/dl)  Induction: intravenous.    MIPS: Prophylactic antiemetics administered.  Anesthetic plan and risks discussed with patient.      Plan discussed with CRNA.    Attending anesthesiologist reviewed and agrees with Preprocedure content              Leni Denise MD   4/24/2025

## 2025-04-25 ENCOUNTER — HOSPITAL ENCOUNTER (OUTPATIENT)
Dept: OPERATING ROOM | Age: 75
Setting detail: OUTPATIENT SURGERY
Discharge: HOME | End: 2025-04-25
Attending: PODIATRIST
Payer: MEDICARE

## 2025-04-25 ENCOUNTER — HOSPITAL ENCOUNTER (OUTPATIENT)
Age: 75
Setting detail: OUTPATIENT SURGERY
Discharge: HOME | End: 2025-04-25
Attending: PODIATRIST | Admitting: PODIATRIST
Payer: MEDICARE

## 2025-04-25 ENCOUNTER — ANESTHESIA (OUTPATIENT)
Dept: OPERATING ROOM | Age: 75
End: 2025-04-25
Payer: MEDICARE

## 2025-04-25 VITALS
WEIGHT: 169.6 LBS | TEMPERATURE: 97.3 F | BODY MASS INDEX: 34.19 KG/M2 | SYSTOLIC BLOOD PRESSURE: 108 MMHG | HEIGHT: 59 IN | HEART RATE: 59 BPM | RESPIRATION RATE: 12 BRPM | DIASTOLIC BLOOD PRESSURE: 51 MMHG | OXYGEN SATURATION: 95 %

## 2025-04-25 DIAGNOSIS — M20.22 ACQUIRED HALLUX RIGIDUS, LEFT: ICD-10-CM

## 2025-04-25 DIAGNOSIS — M20.22 HALLUX RIGIDUS OF LEFT FOOT: Primary | ICD-10-CM

## 2025-04-25 DIAGNOSIS — G89.18 POST-OP PAIN: ICD-10-CM

## 2025-04-25 PROCEDURE — C1776 JOINT DEVICE (IMPLANTABLE): HCPCS | Performed by: PODIATRIST

## 2025-04-25 PROCEDURE — 3600000005 HC SURGERY LEVEL 5 BASE: Performed by: PODIATRIST

## 2025-04-25 PROCEDURE — C1713 ANCHOR/SCREW BN/BN,TIS/BN: HCPCS | Performed by: PODIATRIST

## 2025-04-25 PROCEDURE — 2580000003 HC RX 258: Performed by: ANESTHESIOLOGY

## 2025-04-25 PROCEDURE — 6360000002 HC RX W HCPCS

## 2025-04-25 PROCEDURE — 2500000003 HC RX 250 WO HCPCS

## 2025-04-25 PROCEDURE — 7100000011 HC PHASE II RECOVERY - ADDTL 15 MIN: Performed by: PODIATRIST

## 2025-04-25 PROCEDURE — 2709999900 HC NON-CHARGEABLE SUPPLY: Performed by: PODIATRIST

## 2025-04-25 PROCEDURE — 6360000002 HC RX W HCPCS: Performed by: PODIATRIST

## 2025-04-25 PROCEDURE — 2720000010 HC SURG SUPPLY STERILE: Performed by: PODIATRIST

## 2025-04-25 PROCEDURE — 7100000010 HC PHASE II RECOVERY - FIRST 15 MIN: Performed by: PODIATRIST

## 2025-04-25 PROCEDURE — 3700000000 HC ANESTHESIA ATTENDED CARE: Performed by: PODIATRIST

## 2025-04-25 PROCEDURE — 2500000003 HC RX 250 WO HCPCS: Performed by: PODIATRIST

## 2025-04-25 PROCEDURE — 3600000015 HC SURGERY LEVEL 5 ADDTL 15MIN: Performed by: PODIATRIST

## 2025-04-25 PROCEDURE — 2580000003 HC RX 258

## 2025-04-25 PROCEDURE — 3700000001 HC ADD 15 MINUTES (ANESTHESIA): Performed by: PODIATRIST

## 2025-04-25 DEVICE — IMPLANTABLE DEVICE
Type: IMPLANTABLE DEVICE | Site: FOOT | Status: FUNCTIONAL
Brand: LPT

## 2025-04-25 RX ORDER — SODIUM CHLORIDE, SODIUM LACTATE, POTASSIUM CHLORIDE, CALCIUM CHLORIDE 600; 310; 30; 20 MG/100ML; MG/100ML; MG/100ML; MG/100ML
INJECTION, SOLUTION INTRAVENOUS
Status: DISCONTINUED | OUTPATIENT
Start: 2025-04-25 | End: 2025-04-25 | Stop reason: SDUPTHER

## 2025-04-25 RX ORDER — FENTANYL CITRATE 50 UG/ML
INJECTION, SOLUTION INTRAMUSCULAR; INTRAVENOUS
Status: DISCONTINUED | OUTPATIENT
Start: 2025-04-25 | End: 2025-04-25 | Stop reason: SDUPTHER

## 2025-04-25 RX ORDER — DIPHENHYDRAMINE HYDROCHLORIDE 50 MG/ML
12.5 INJECTION, SOLUTION INTRAMUSCULAR; INTRAVENOUS
Status: DISCONTINUED | OUTPATIENT
Start: 2025-04-25 | End: 2025-04-25 | Stop reason: HOSPADM

## 2025-04-25 RX ORDER — PHENYLEPHRINE HYDROCHLORIDE 10 MG/ML
INJECTION INTRAVENOUS
Status: DISCONTINUED | OUTPATIENT
Start: 2025-04-25 | End: 2025-04-25 | Stop reason: SDUPTHER

## 2025-04-25 RX ORDER — SODIUM CHLORIDE 0.9 % (FLUSH) 0.9 %
5-40 SYRINGE (ML) INJECTION PRN
Status: DISCONTINUED | OUTPATIENT
Start: 2025-04-25 | End: 2025-04-25 | Stop reason: HOSPADM

## 2025-04-25 RX ORDER — DROPERIDOL 2.5 MG/ML
0.62 INJECTION, SOLUTION INTRAMUSCULAR; INTRAVENOUS
Status: DISCONTINUED | OUTPATIENT
Start: 2025-04-25 | End: 2025-04-25 | Stop reason: HOSPADM

## 2025-04-25 RX ORDER — HYDROCODONE BITARTRATE AND ACETAMINOPHEN 5; 325 MG/1; MG/1
1 TABLET ORAL EVERY 6 HOURS PRN
Qty: 28 TABLET | Refills: 0 | Status: SHIPPED | OUTPATIENT
Start: 2025-04-25 | End: 2025-05-02

## 2025-04-25 RX ORDER — MORPHINE SULFATE 2 MG/ML
1 INJECTION, SOLUTION INTRAMUSCULAR; INTRAVENOUS EVERY 5 MIN PRN
Status: DISCONTINUED | OUTPATIENT
Start: 2025-04-25 | End: 2025-04-25 | Stop reason: HOSPADM

## 2025-04-25 RX ORDER — WATER 10 ML/10ML
INJECTION INTRAMUSCULAR; INTRAVENOUS; SUBCUTANEOUS
Status: DISCONTINUED
Start: 2025-04-25 | End: 2025-04-25 | Stop reason: HOSPADM

## 2025-04-25 RX ORDER — HYDROCODONE BITARTRATE AND ACETAMINOPHEN 5; 325 MG/1; MG/1
1 TABLET ORAL EVERY 6 HOURS PRN
Status: DISCONTINUED | OUTPATIENT
Start: 2025-04-25 | End: 2025-04-25 | Stop reason: HOSPADM

## 2025-04-25 RX ORDER — EPHEDRINE SULFATE/0.9% NACL/PF 25 MG/5 ML
SYRINGE (ML) INTRAVENOUS
Status: DISCONTINUED | OUTPATIENT
Start: 2025-04-25 | End: 2025-04-25 | Stop reason: SDUPTHER

## 2025-04-25 RX ORDER — SODIUM CHLORIDE 9 MG/ML
INJECTION, SOLUTION INTRAVENOUS PRN
Status: DISCONTINUED | OUTPATIENT
Start: 2025-04-25 | End: 2025-04-25 | Stop reason: HOSPADM

## 2025-04-25 RX ORDER — SODIUM CHLORIDE 0.9 % (FLUSH) 0.9 %
5-40 SYRINGE (ML) INJECTION EVERY 12 HOURS SCHEDULED
Status: DISCONTINUED | OUTPATIENT
Start: 2025-04-25 | End: 2025-04-25 | Stop reason: HOSPADM

## 2025-04-25 RX ORDER — OXYCODONE AND ACETAMINOPHEN 5; 325 MG/1; MG/1
1 TABLET ORAL EVERY 4 HOURS PRN
Refills: 0 | Status: DISCONTINUED | OUTPATIENT
Start: 2025-04-25 | End: 2025-04-25 | Stop reason: HOSPADM

## 2025-04-25 RX ORDER — GLYCOPYRROLATE 0.2 MG/ML
INJECTION INTRAMUSCULAR; INTRAVENOUS
Status: DISCONTINUED | OUTPATIENT
Start: 2025-04-25 | End: 2025-04-25 | Stop reason: SDUPTHER

## 2025-04-25 RX ORDER — ONDANSETRON 2 MG/ML
INJECTION INTRAMUSCULAR; INTRAVENOUS
Status: DISCONTINUED | OUTPATIENT
Start: 2025-04-25 | End: 2025-04-25 | Stop reason: SDUPTHER

## 2025-04-25 RX ORDER — FENTANYL CITRATE 0.05 MG/ML
50 INJECTION, SOLUTION INTRAMUSCULAR; INTRAVENOUS EVERY 5 MIN PRN
Status: DISCONTINUED | OUTPATIENT
Start: 2025-04-25 | End: 2025-04-25 | Stop reason: HOSPADM

## 2025-04-25 RX ORDER — NALOXONE HYDROCHLORIDE 0.4 MG/ML
INJECTION, SOLUTION INTRAMUSCULAR; INTRAVENOUS; SUBCUTANEOUS PRN
Status: DISCONTINUED | OUTPATIENT
Start: 2025-04-25 | End: 2025-04-25 | Stop reason: HOSPADM

## 2025-04-25 RX ORDER — CEFAZOLIN SODIUM 1 G/3ML
INJECTION, POWDER, FOR SOLUTION INTRAMUSCULAR; INTRAVENOUS
Status: DISCONTINUED
Start: 2025-04-25 | End: 2025-04-25 | Stop reason: HOSPADM

## 2025-04-25 RX ORDER — PROPOFOL 10 MG/ML
INJECTION, EMULSION INTRAVENOUS
Status: DISCONTINUED | OUTPATIENT
Start: 2025-04-25 | End: 2025-04-25 | Stop reason: SDUPTHER

## 2025-04-25 RX ORDER — DEXAMETHASONE SODIUM PHOSPHATE 10 MG/ML
INJECTION, SOLUTION INTRAMUSCULAR; INTRAVENOUS
Status: DISCONTINUED | OUTPATIENT
Start: 2025-04-25 | End: 2025-04-25 | Stop reason: SDUPTHER

## 2025-04-25 RX ORDER — KETOROLAC TROMETHAMINE 30 MG/ML
INJECTION, SOLUTION INTRAMUSCULAR; INTRAVENOUS
Status: DISCONTINUED | OUTPATIENT
Start: 2025-04-25 | End: 2025-04-25 | Stop reason: SDUPTHER

## 2025-04-25 RX ORDER — MIDAZOLAM HYDROCHLORIDE 1 MG/ML
INJECTION, SOLUTION INTRAMUSCULAR; INTRAVENOUS
Status: DISCONTINUED | OUTPATIENT
Start: 2025-04-25 | End: 2025-04-25 | Stop reason: SDUPTHER

## 2025-04-25 RX ORDER — SODIUM CHLORIDE, SODIUM LACTATE, POTASSIUM CHLORIDE, CALCIUM CHLORIDE 600; 310; 30; 20 MG/100ML; MG/100ML; MG/100ML; MG/100ML
INJECTION, SOLUTION INTRAVENOUS CONTINUOUS
Status: DISCONTINUED | OUTPATIENT
Start: 2025-04-25 | End: 2025-04-25 | Stop reason: HOSPADM

## 2025-04-25 RX ORDER — MEPERIDINE HYDROCHLORIDE 25 MG/ML
12.5 INJECTION INTRAMUSCULAR; INTRAVENOUS; SUBCUTANEOUS ONCE
Status: DISCONTINUED | OUTPATIENT
Start: 2025-04-25 | End: 2025-04-25 | Stop reason: HOSPADM

## 2025-04-25 RX ADMIN — KETOROLAC TROMETHAMINE 15 MG: 30 INJECTION, SOLUTION INTRAMUSCULAR at 07:56

## 2025-04-25 RX ADMIN — ONDANSETRON 4 MG: 2 INJECTION INTRAMUSCULAR; INTRAVENOUS at 07:23

## 2025-04-25 RX ADMIN — EPHEDRINE SULFATE 5 MG: 5 INJECTION INTRAVENOUS at 07:34

## 2025-04-25 RX ADMIN — EPHEDRINE SULFATE 10 MG: 5 INJECTION INTRAVENOUS at 07:35

## 2025-04-25 RX ADMIN — FENTANYL CITRATE 25 MCG: 50 INJECTION, SOLUTION INTRAMUSCULAR; INTRAVENOUS at 07:25

## 2025-04-25 RX ADMIN — SODIUM CHLORIDE, POTASSIUM CHLORIDE, SODIUM LACTATE AND CALCIUM CHLORIDE: 600; 310; 30; 20 INJECTION, SOLUTION INTRAVENOUS at 06:31

## 2025-04-25 RX ADMIN — PHENYLEPHRINE HYDROCHLORIDE 300 MCG: 10 INJECTION INTRAVENOUS at 07:41

## 2025-04-25 RX ADMIN — DEXAMETHASONE SODIUM PHOSPHATE 10 MG: 10 INJECTION, SOLUTION INTRAMUSCULAR; INTRAVENOUS at 07:23

## 2025-04-25 RX ADMIN — GLYCOPYRROLATE 0.2 MG: 0.2 INJECTION, SOLUTION INTRAMUSCULAR; INTRAVENOUS at 07:23

## 2025-04-25 RX ADMIN — SODIUM CHLORIDE, POTASSIUM CHLORIDE, SODIUM LACTATE AND CALCIUM CHLORIDE: 600; 310; 30; 20 INJECTION, SOLUTION INTRAVENOUS at 07:23

## 2025-04-25 RX ADMIN — PROPOFOL INJECTABLE EMULSION 120 MCG/KG/MIN: 10 INJECTION, EMULSION INTRAVENOUS at 07:23

## 2025-04-25 RX ADMIN — CEFAZOLIN SODIUM 2000 MG: 1 POWDER, FOR SOLUTION INTRAMUSCULAR; INTRAVENOUS at 07:19

## 2025-04-25 RX ADMIN — Medication 25 MG: at 07:23

## 2025-04-25 RX ADMIN — EPHEDRINE SULFATE 10 MG: 5 INJECTION INTRAVENOUS at 07:32

## 2025-04-25 RX ADMIN — MIDAZOLAM 2 MG: 1 INJECTION INTRAMUSCULAR; INTRAVENOUS at 07:23

## 2025-04-25 ASSESSMENT — PAIN - FUNCTIONAL ASSESSMENT
PAIN_FUNCTIONAL_ASSESSMENT: NONE - DENIES PAIN

## 2025-04-25 ASSESSMENT — LIFESTYLE VARIABLES: SMOKING_STATUS: 0

## 2025-04-25 NOTE — ANESTHESIA POSTPROCEDURE EVALUATION
Department of Anesthesiology  Postprocedure Note    Patient: Kylee Ny  MRN: [id number]  YOB: 1950  Date of evaluation: 4/25/2025    Procedure Summary       Date: 04/25/25 Room / Location: [address]    Anesthesia Start: 0716 Anesthesia Stop: 0811    Procedure: GREAT TOE ARTHROPLASTY WITH IMPLANT (Left: Foot) Diagnosis:       Hallux rigidus of left foot      (Hallux rigidus of left foot [M20.22])    Surgeons: Christpoher Hutchinson Jr., DPM Responsible Provider: Dao Loyd MD    Anesthesia Type: MAC ASA Status: 3            Anesthesia Type: No value filed.    Win Phase I: Win Score: 10    Win Phase II: Win Score: 10    Anesthesia Post Evaluation    Patient location during evaluation: PACU  Patient participation: complete - patient participated  Level of consciousness: awake and alert  Airway patency: patent  Nausea & Vomiting: no nausea and no vomiting  Cardiovascular status: hemodynamically stable  Respiratory status: room air and spontaneous ventilation  Hydration status: stable  Pain management: satisfactory to patient    No notable events documented.

## 2025-04-25 NOTE — DISCHARGE INSTRUCTIONS
General Post-Op Instructions  MYNOR Fuentes Jr., D.P.M.  [phone]    Post Operative Footcare Instructions:    1.  Keep your dressings clean and dry.  A clean dressing promotes healthy healing.  2.   DO NOT remove the dressing from your foot/ankle, unless instructed by your doctor.  3.   Elevate your foot above the level of your heart for the first 3-4 days as much as possible.  4.   Place ice on top of your foot/ankle 15 min. every waking hour for the first 3-4 days.  5.   Take your pain medication, with food,as prescribed.              (follow all instructions regarding prescription medications)  6.   Wear your surgical shoe at all times, and even to bed the first 3-4 days post-op.  7.   DO NOT take a shower and get your foot wet.  8.   You may take a sponge bath or hang you foot over the side of the bath tub.  9.  Take your temperature daily each morning, and call the doctor if your temp. is higher than                            102.5.  10.  You may walk on your foot as tolerated.  11.  Resume normal diet and medications (unless otherwise instructed by your doctor).  12.  No driving until approved by Dr. Fuentes  13. You should have a responsible adult with you for at 24 hrs.    ************************************************************************************    1.   Call the office at [phone] to make an appointment.  2.   Your first post-op appointment should be made tomorrow for within 1 week of the surgery.  3.  To contact the doctor, you may do so by calling [phone] or [phone].   4.   Take it easy for the next few days and remember, “If you are good to your foot, it will be                                    good to you.”        If any problems occur or if you have any further questions, please call your doctor as soon as possible. If you find that you cannot reach your doctor but feel that your condition needs a doctor’s attention go to an emergency room.

## 2025-04-25 NOTE — H&P
Update History & Physical    The patient's History and Physical of 4 / 25 / 25 was reviewed with the patient and there were no significant changes.    I examined the patient and there were no significant changes from the previous History and Physical.    .v    Plan: The risk, benefits, expected outcome, and alternative to the recommended procedure have been discussed with the patient.  Patient understands and wants to proceed with the procedure.    Electronically signed by Christopher Fuentes Jr, DPM on 4/25/25 at 7:05 AM EDT   Detail Level: Zone Photo Preface (Leave Blank If You Do Not Want): Photographs were obtained today

## 2025-04-25 NOTE — PROGRESS NOTES
Glucoscan not working. Unable to obtain blood sugar. Point of care testing notified and will be bringing replacement

## 2025-04-25 NOTE — OP NOTE
Operative Note      Patient: Kylee Ny  YOB: 1950  MRN: [id number]    Date of Procedure: 4/25/2025    Pre-Op Diagnosis Codes:      * Hallux rigidus of left foot [M20.22]    Post-Op Diagnosis: Same       Procedure(s):  GREAT TOE ARTHROPLASTY WITH IMPLANT    Surgeon(s):  Christopher Hutchinson Jr., BRIA    Assistant:   * No surgical staff found *    Anesthesia: Monitor Anesthesia Care    Estimated Blood Loss (mL): Minimal    Complications: None    Specimens:   * No specimens in log *    Implants:  Implant Name Type Inv. Item Serial No.  Lot No. LRB No. Used Action   IMPLANT TOE JT SZ 2 L15MM REG TI MED BILAT PERF H LPT - [id number]  IMPLANT TOE JT SZ 2 L15MM REG TI MED BILAT PERF H LPT  [id number] Left 1 Implanted         Drains: * No LDAs found *    Findings:  Infection Present At Time Of Surgery (PATOS) (choose all levels that have infection present):  No infection present  Other Findings: Consistent with diagnosis.  Severe joint articular cartilage damage on both metatarsal and phalangeal component    Detailed Description of Procedure:   Patient presented to the operative room and placed the operative table in the supine position.  Patient noted IV sedation by department anesthesia.  Once sedated the patient's foot was localized by the surgeon comprised of an equal mixture of 0.5% Marcaine plain along 2% lidocaine plain totaling 10 cc this was injected in a localized fashion after which time the patient's foot was repaired scrubbed and draped in a sterile fashion.  Ankle tourniquet utilized for the duration of the procedure which was approximately 20 minutes    After timeout was completed tourniquet inflated foot properly draped and prepped surgery commenced    Linear incision was made the dorsal aspect of the left foot paralleling the extensor hallucis longus tendon #10 blade.  The supra this incision was deepened with sharp dissection right down to the

## 2025-04-28 LAB
VAS AORTA DIST AP: 3.17 CM
VAS AORTA DIST TR: 3.21 CM
VAS AORTA MID AP: 2.01 CM
VAS AORTA MID TRANS: 1.98 CM
VAS AORTA PROX AP: 2.08 CM
VAS AORTA PROX TR: 2.03 CM
VAS LEFT CCA DIST EDV: 20.5 CM/S
VAS LEFT CCA DIST PSV: 101.7 CM/S
VAS LEFT CCA PROX EDV: 25.2 CM/S
VAS LEFT CCA PROX PSV: 83.4 CM/S
VAS LEFT COM ILIAC AP: 0.9 CM
VAS LEFT COM ILIAC TRANS: 0.79 CM
VAS LEFT ECA EDV: 22.9 CM/S
VAS LEFT ECA PSV: 133.5 CM/S
VAS LEFT ICA DIST EDV: 32.5 CM/S
VAS LEFT ICA DIST PSV: 114.1 CM/S
VAS LEFT ICA MID EDV: 32.3 CM/S
VAS LEFT ICA MID PSV: 109.8 CM/S
VAS LEFT ICA PROX EDV: 43.4 CM/S
VAS LEFT ICA PROX PSV: 128.6 CM/S
VAS LEFT ICA/CCA PSV: 1.3 NO UNITS
VAS LEFT VERTEBRAL EDV: 14 CM/S
VAS LEFT VERTEBRAL PSV: 40.4 CM/S
VAS RIGHT CCA DIST EDV: 23.2 CM/S
VAS RIGHT CCA DIST PSV: 77.6 CM/S
VAS RIGHT CCA PROX EDV: 19.4 CM/S
VAS RIGHT CCA PROX PSV: 90.8 CM/S
VAS RIGHT COM ILIAC AP: 1.1 CM
VAS RIGHT COM ILIAC TRANS: 1.08 CM
VAS RIGHT ECA EDV: 25.5 CM/S
VAS RIGHT ECA PSV: 226.5 CM/S
VAS RIGHT ICA DIST EDV: 30.7 CM/S
VAS RIGHT ICA DIST PSV: 83.3 CM/S
VAS RIGHT ICA MID EDV: 41.5 CM/S
VAS RIGHT ICA MID PSV: 119.6 CM/S
VAS RIGHT ICA PROX EDV: 36.3 CM/S
VAS RIGHT ICA PROX PSV: 120.3 CM/S
VAS RIGHT ICA/CCA PSV: 1.3 NO UNITS
VAS RIGHT VERTEBRAL EDV: 16.4 CM/S
VAS RIGHT VERTEBRAL PSV: 52.6 CM/S

## (undated) DEVICE — SPONGE,PEANUT,XRAY,ST,SM,3/8",5/CARD: Brand: MEDLINE INDUSTRIES, INC.

## (undated) DEVICE — CONTAINER SPEC 60ML PH 7NEUTRAL BUFF FRMLN RDY TO USE

## (undated) DEVICE — PROBE 8225101 5PK STD PRASS FL TIP ROHS

## (undated) DEVICE — TOWEL,OR,DSP,ST,BLUE,STD,6/PK,12PK/CS: Brand: MEDLINE

## (undated) DEVICE — INTENDED FOR TISSUE SEPARATION, AND OTHER PROCEDURES THAT REQUIRE A SHARP SURGICAL BLADE TO PUNCTURE OR CUT.: Brand: BARD-PARKER ® STAINLESS STEEL BLADES

## (undated) DEVICE — HOOK RETRCT 12MM S STL BLNT E STAY LONE STAR

## (undated) DEVICE — BITEBLOCK 54FR W/ DENT RIM BLOX

## (undated) DEVICE — 12 ML SYRINGE,LUER-LOCK TIP: Brand: MONOJECT

## (undated) DEVICE — AGENT HEMSTAT 3GM PURIFIED PLNT STARCH PWD ABSRB ARISTA AH

## (undated) DEVICE — BANDAGE,GAUZE,BULKEE II,4.5"X4.1YD,STRL: Brand: MEDLINE

## (undated) DEVICE — CORD,CAUTERY,BIPOLAR,STERILE: Brand: MEDLINE

## (undated) DEVICE — KIT SURG W7XL11IN 2 PKT UNTREATED NA

## (undated) DEVICE — GLOVE ORANGE PI 7   MSG9070

## (undated) DEVICE — GAUZE,SPONGE,4"X4",16PLY,XRAY,STRL,LF: Brand: MEDLINE

## (undated) DEVICE — PEN: MARKING STD 100/CS: Brand: MEDICAL ACTION INDUSTRIES

## (undated) DEVICE — FORCEPS BX L240CM JAW DIA2.4MM WRK CHN 2.8MM ORNG L CAP W/

## (undated) DEVICE — COVER,LIGHT HANDLE,FLX,2/PK: Brand: MEDLINE INDUSTRIES, INC.

## (undated) DEVICE — SURGICAL PROCEDURE PACK BASIC

## (undated) DEVICE — PADDING UNDERCAST W4INXL4YD COT FBR LO LINTING WYTEX

## (undated) DEVICE — E-Z CLEAN, NON-STICK, PTFE COATED, ELECTROSURGICAL BLADE ELECTRODE, MODIFIED EXTENDED INSULATION, 2.5 INCH (6.35 CM): Brand: MEGADYNE

## (undated) DEVICE — TUBING, SUCTION, 3/16" X 10', STRAIGHT: Brand: MEDLINE

## (undated) DEVICE — GLOVE ORANGE PI 7 1/2   MSG9075

## (undated) DEVICE — SPONGE LAP W18XL18IN WHT COT 4 PLY FLD STRUNG RADPQ DISP ST 2 PER PACK

## (undated) DEVICE — FORCEPS BX L160CM JAW DIA2.4MM YEL L CAP W/ NDL DISP RAD

## (undated) DEVICE — NEEDLE HYPO 25GA L1.5IN BLU POLYPR HUB S STL REG BVL STR

## (undated) DEVICE — SOLUTION IRRIG 1000ML STRL H2O USP PLAS POUR BTL

## (undated) DEVICE — BLADE,STAINLESS-STEEL,15,STRL,DISPOSABLE: Brand: MEDLINE

## (undated) DEVICE — SOLUTION IRRIG 1000ML 09% SOD CHL USP PIC PLAS CONTAINER

## (undated) DEVICE — GAUZE,SPONGE,4"X4",16PLY,STRL,LF,10/TRAY: Brand: MEDLINE

## (undated) DEVICE — BASIC PACK: Brand: CONVERTORS

## (undated) DEVICE — SYRINGE IRRIG 60ML SFT PLIABLE BLB EZ TO GRP 1 HND USE W/

## (undated) DEVICE — SHEARS ENDOSCP L9CM CRV HARM FOCS +

## (undated) DEVICE — Z DISCONTINUED NO SUB IDED TUBING ETCO2 AD L6.5FT NSL ORAL CVD PRNG NONFLARED TIP OVR

## (undated) DEVICE — DOUBLE BASIN SET: Brand: MEDLINE INDUSTRIES, INC.

## (undated) DEVICE — GLOVE SURG SZ 75 L12IN FNGR THK94MIL TRNSLUC YEL LTX

## (undated) DEVICE — 4-PORT MANIFOLD: Brand: NEPTUNE 2

## (undated) DEVICE — DRAPE 84X54IN RADIOLOGY C ARM KEYBOARD

## (undated) DEVICE — STRIP,CLOSURE,WOUND,MEDI-STRIP,1/2X4: Brand: MEDLINE

## (undated) DEVICE — K- WIRE
Type: IMPLANTABLE DEVICE | Site: FOOT | Status: NON-FUNCTIONAL
Brand: SWANSON
Removed: 2025-04-25

## (undated) DEVICE — SUTURE N ABSRB L 18 IN SZ 4-0 NDL L 19 MM NYL MONOFILAMENT

## (undated) DEVICE — SUTURE VICRYL SZ 2-0 L27IN ABSRB UD L26MM CT-2 1/2 CIR J269H

## (undated) DEVICE — TUBING SUCT 12FR MAL ALUM SHFT FN CAP VENT UNIV CONN W/ OBT

## (undated) DEVICE — GOWN,SIRUS,FABRNF,XL,20/CS: Brand: MEDLINE

## (undated) DEVICE — SMALL TEAR CROSS CUT RASP (11.0 X 5.0MM)

## (undated) DEVICE — STANDARD (U) BLADE ASSEMBLY 6PK: Brand: MICROAIRE®

## (undated) DEVICE — STANDARD HYPODERMIC NEEDLE,ALUMINUM HUB: Brand: MONOJECT

## (undated) DEVICE — SET INSTRUMENT MINOR PLASTICS

## (undated) DEVICE — ZIMMER® STERILE DISPOSABLE TOURNIQUET CUFF WITH PLC, DUAL PORT, SINGLE BLADDER, 18 IN. (46 CM)

## (undated) DEVICE — DRAIN SURG 15FR L3/16IN DIA4.7MM SIL CHN RND FULL FLUT TRCR

## (undated) DEVICE — TIBURON EXTREMITY SHEET: Brand: CONVERTORS

## (undated) DEVICE — COVER HNDL LT DISP

## (undated) DEVICE — SYRINGE, LUER LOCK, 5ML: Brand: MEDLINE

## (undated) DEVICE — MAGNETIC INSTR DRAPE 20X16: Brand: MEDLINE INDUSTRIES, INC.

## (undated) DEVICE — PRECISION THIN (9.0 X 0.38 X 25.0MM)

## (undated) DEVICE — Device

## (undated) DEVICE — PATIENT RETURN ELECTRODE, SINGLE-USE, CONTACT QUALITY MONITORING, ADULT, WITH 9FT CORD, FOR PATIENTS WEIGING OVER 33LBS. (15KG): Brand: MEGADYNE

## (undated) DEVICE — GOWN,SIRUS,NON REINFRCD,LARGE,SET IN SL: Brand: MEDLINE

## (undated) DEVICE — KIT SURG PREP POVIDONE IOD PRESATURATED PAINT WET FOR UNIV

## (undated) DEVICE — SUTURE MONOCRYL SZ 3-0 L27IN ABSRB UD L26MM SH 1/2 CIR Y416H

## (undated) DEVICE — SURGICAL PROCEDURE PACK EENT CUST

## (undated) DEVICE — GAUZE,SPONGE,POST-OP,4X3,STRL,LF: Brand: MEDLINE

## (undated) DEVICE — GAUZE 2X2IN ST BORDERED

## (undated) DEVICE — LOW PROFILE (LP) BLADE ASSEMBLY 6PK: Brand: MICROAIRE®

## (undated) DEVICE — APPLICATOR MEDICATED 26 CC SOLUTION HI LT ORNG CHLORAPREP

## (undated) DEVICE — SURGICAL PROCEDURE PACK HND

## (undated) DEVICE — HOOK LOCK LATEX FREE ELASTIC BANDAGE 3INX5YD

## (undated) DEVICE — WIPES SKIN CLOTH READYPREP 9 X 10.5 IN 2% CHLORHEX GLUCONATE CHG PREOP

## (undated) DEVICE — DRESSING ALG W2XL5IN ANTIMIC WND JUMPSTART

## (undated) DEVICE — SPONGE,LAP,4"X18",XR,ST,5/PK,40PK/CS: Brand: MEDLINE INDUSTRIES, INC.

## (undated) DEVICE — DEFENDO AIR WATER SUCTION AND BIOPSY VALVE KIT FOR  OLYMPUS: Brand: DEFENDO AIR/WATER/SUCTION AND BIOPSY VALVE

## (undated) DEVICE — DRESSING FOAM W22XL25CM FILVE LAYR FOAM DP DEF SAFETAC

## (undated) DEVICE — SOLUTION IRRIG 1000ML 0.9% SOD CHL USP POUR PLAS BTL

## (undated) DEVICE — PADDING,UNDERCAST,COTTON, 4"X4YD STERILE: Brand: MEDLINE

## (undated) DEVICE — ENDOTRACH TUBE 8229507 CONT EMG 7MM ROHS: Brand: NIM CONTACT®

## (undated) DEVICE — SET INSTR DISSECT ENT

## (undated) DEVICE — CONNECTOR IRRIGATION AUXILIARY H2O JET W/ PRT MTL THRD HYDR

## (undated) DEVICE — COUNTER NDL 10 COUNT HLD 20 FOAM BLK SGL MAG

## (undated) DEVICE — MASTISOL ADHESIVE LIQ 2/3ML

## (undated) DEVICE — SYRINGE MED 10ML TRNSLUC BRL PLUNG BLK MRK POLYPR CTRL

## (undated) DEVICE — PLASMABLADE PS210-030S 3.0S LOCK: Brand: PLASMABLADE™

## (undated) DEVICE — NEEDLE HYPO 18GA L1.5IN PNK POLYPR HUB S STL REG BVL STR